# Patient Record
Sex: MALE | Race: BLACK OR AFRICAN AMERICAN | Employment: FULL TIME | URBAN - METROPOLITAN AREA
[De-identification: names, ages, dates, MRNs, and addresses within clinical notes are randomized per-mention and may not be internally consistent; named-entity substitution may affect disease eponyms.]

---

## 2018-10-31 ENCOUNTER — HOSPITAL ENCOUNTER (OUTPATIENT)
Dept: PHYSICAL THERAPY | Age: 68
Discharge: HOME | End: 2018-10-31
Payer: MEDICARE

## 2018-10-31 PROCEDURE — G8979 MOBILITY GOAL STATUS: HCPCS

## 2018-10-31 PROCEDURE — G8978 MOBILITY CURRENT STATUS: HCPCS

## 2018-10-31 PROCEDURE — 97530 THERAPEUTIC ACTIVITIES: CPT

## 2018-10-31 PROCEDURE — 97110 THERAPEUTIC EXERCISES: CPT

## 2018-10-31 PROCEDURE — 97162 PT EVAL MOD COMPLEX 30 MIN: CPT

## 2018-10-31 NOTE — PROGRESS NOTES
PT DAILY TREATMENT NOTE - [facility] 3-16 Patient Name: Arlan Denver Date:10/31/2018 : 1950 [x]  Patient  Verified Payor: [place] MED[place]ARE / Plan: Reva Cox / Product Type: Medicare / In time:10:18 am  Out time:11:05 am  
Total Treatment Time (min): 47 Total Timed Codes (min): 23 
1:1 Treatment Time ( [address] only): 47 Visit #: 1 of 12 Treatment Area: Right knee pain [M25.561] Low back pain [M54.5] SUBJECTIVE Pain Level (0-10 scale): 1-2 - LBP Any medication changes, allergies to medications, adverse drug reactions, diagnosis change, or new procedure performed?: [x] No    [] Yes (see summary sheet for update) Subjective functional status/changes:   [] No changes reported Hx Present Illness: C/C of Right knee pain and LBP \"My knee is ok it gets sore and stiff sometimes. \" Increase in knee pain with sitting >1 hour, squatting, standing after sitting, intermittent pain with stair negotiation Had Right TKR in 2014 Reports intermittent knee pain since replacement Pt expressed that feels that quad and LE is weak \"The back. It bothers me. \" Increase in LBP with sitting, bending, squatting Has positive hx of LBP in the past treated successfully with PT Reoccurrence of LBP ~1 month ago with working on generator at home Denies radicular sx at this time and denies pain into either LE Pain:  _5__/10 max       __0_/10 min     _1-2___/10 now    Location: indicates Left side of L-spine - right QL, Left buttock Right Knee - indicates lateral joint line, anterior joint line 
   [] Sharp    [] Dull      [] Burning     [x]  Aching     [] Throbbing      [] Tingling 
   [] Other:  
    []  Constant                   [x] Intermittent Previous treatment:   Previously PT in  PMHX: PMHx/Surgical Hx:  Right Knee TKR - 2014 Social/Recreation/Work: Work Hx: manager of operations for [employer] Living Situation: Lives with spouse, 2-story home Recreational Activities: walks for exercise 1 hour 3-4 times per week, household chores, yard work Patient Goal(s): \"Bottom line: exercises that I can do, when to do them to maintain the overall strength of my legs and core. \" 
 
Barriers: []pain []financial []time []transportation []other Motivation: high Substance use: []Alcohol []Tobacco []other:  
FABQ Score: []low []elevate Cognition: A & O x 4 Other OBJECTIVE 24 min [x]Eval                  []Re-Eval  
 
       
With 
 [x] TE - 8 min 
 [x] TA - 15 min min  
 [] neuro 
 [] other: Patient Education: [x] Review HEP [] Progressed/Changed HEP based on:  
[] positioning   [] body mechanics   [] transfers   [] heat/ice application   
[x] other: pt education regarding exam findings, anatomy involved, POC, sit<>stand and positioning techniques Other Objective/Functional Measures: Movement/gait:  Decreased right arm swing, toe out bilaterally Visual Inspection: Thoracic: flattened Lumbar: increased Shoulder/Scapula: left shoulder lower, bilateral scap adducted Palpation: palpable crepitus at left knee with AROM ext<>flexion Increased tone in lumbar paraspinals Noted clunking at right knee with assessment  
TTP at left posterior hip musculature AROM/PROM Right Left Standing Forward Flexion: 4 in from floor Extension: dcr 25% Trunk Rot 19 in  19.5 in Hip IR 22 15 Knee Ext-Flex 0-132[facility] Strength Right Left Quad Set strong strong Hip Flex 5 5 Knee Ext 5 5 Hamstrings 4 4 Hip Abd 4 4 Bridging: decreased height, hamstring cramping Glut inhibition: bilaterally Special Tests Right Left Slump - -  
SLR - - Passive SLR 90 90 Hip Add Drop + + Gaenslen's - -  
YINA - - Other Right Left Other Comments: Standing Forward Flexion 4 in from floor, decreased reversal of lordosis Gillet: hypomobile bilaterally, Left >Right Pain Level (0-10 scale) post treatment: 0-1 ASSESSMENT/Changes in Function:  
Pt is a very pleasant 77 y.o. Male with C/C of intermittent LBP and Right knee pain. Pt has had right TKR in 2014 and has hx of LBP that has been treated with positive results in PT previously. Pt reports insidious onset of LBP ~ 1 month ago with working on piece of equipment. Pt reports has had intermittent right knee pain since TKR. At present signs/symptoms of LBP consistent with mechanical LBP, pt denies radicular sx at this time and none elicited with exam techniques. Objected findings include decreased trunk rot, decreased reversal of lordosis with forward flexion, decreased core stabilization, decreased hip and glut strength with significant glut and hamstring inhibition. Also noted significantly decreased Hip IR and anterior tip to pelvis. Patient will continue to benefit from skilled PT services to modify and progress therapeutic interventions, address functional mobility deficits, address ROM deficits, address strength deficits, analyze and address soft tissue restrictions, analyze and cue movement patterns, analyze and modify body mechanics/ergonomics, assess and modify postural abnormalities and instruct in home and community integration to attain remaining goals. [x]  See Plan of Care 
[]  See progress note/recertification 
[]  See Discharge Summary Progress towards goals / Updated goals: 
Short Term Goals: STG- To be accomplished in 2 week(s): 1. Pt will be independent with HEP to encourage prophylaxis. Eval:dispensed Current: NA Long Term Goals: LTG- To be accomplished in 6 week(s): 1. Pt will improve trunk rotation to 15 in or less in hooklying bilaterally to indicate mobility needed for gait and daily activities. . 
Eval:Right 19 in Left 19.5 in Current: NA 
 
2.   Pt will be able to bridge >10 times to full height with proper form and no hamstring cramping to indicate increased functional glut and hamstring strength needed for daily activities. Eval:decreased height and cramping in hamstring with 2nd rep Current: NA 
 
3. Pt will be able to sit >90 min and stand with no LBP or knee pain to increase tolerance to work and daily activities. Eval:pain with standing after sitting 30 min Current: NA 
 
4. Pt FOTO score will increase by 5 points to show improvement in function. Eval:68 - Knee Current: will address at visit 5 PLAN [x]  Upgrade activities as tolerated     []  Continue plan of care 
[]  Update interventions per flow sheet      
[]  Discharge due to:_ 
[]  Other:_ Marty Code, PT, DPT 10/31/2018  10:19 AM 
 
No future appointments.

## 2018-10-31 NOTE — PROGRESS NOTES
[facility] at the [address], [place], [address] [facility] Phone: [phone]      Fax:  [phone] Plan of Care/ Statement of Necessity for Physical Therapy Services Patient name: Ulysses Garcia Start of Care: 10/31/2018 Referral source: Marylen Reasoner,  : 1950 Medical Diagnosis: Right knee pain [M25.561] Low back pain [M54.5] Onset Date:~1 month ago Treatment Diagnosis: Right Knee Pain, Mechanical LBP Prior Hospitalization: see medical history Provider#: [id number] Medications: Verified on Patient summary List  
 Comorbidities: HTN, Hx of LBP, previous surgery, BMI >30 Prior Level of Function: walking 3 miles 4 days a week with no pain, no pain or restriction with daily and work activities The Plan of Care and following information is based on the information from the initial evaluation. Assessment/ key information:  
Pt is a very pleasant 77 y.o. Male with C/C of intermittent LBP and Right knee pain. Pt has had right TKR in  and has hx of LBP that has been treated with positive results in PT previously. Pt reports insidious onset of LBP ~ 1 month ago with working on piece of equipment. Pt reports has had intermittent right knee pain since TKR. At present signs/symptoms of LBP consistent with mechanical LBP, pt denies radicular sx at this time and none elicited with exam techniques. Objected findings include decreased trunk rot, decreased reversal of lordosis with forward flexion, decreased core stabilization, decreased hip and glut strength with significant glut and hamstring inhibition. Also noted significantly decreased Hip IR and anterior tip to pelvis.   
 
Evaluation Complexity History HIGH Complexity :3+ comorbidities / personal factors will impact the outcome/ POC ; Examination HIGH Complexity : 4+ Standardized tests and measures addressing body structure, function, activity limitation and / or participation in recreation  ;Presentation MEDIUM Complexity : Evolving with changing characteristics  ; Clinical Decision Making MEDIUM Complexity : FOTO score of 26-74 Overall Complexity Rating: MEDIUM Problem List: pain affecting function, decrease ROM, decrease strength, impaired gait/ balance, decrease ADL/ functional abilitiies, decrease activity tolerance and decrease flexibility/ joint mobility Treatment Plan may include any combination of the following: Therapeutic exercise, Therapeutic activities, Neuromuscular re-education, Physical agent/modality, Gait/balance training, Manual therapy and Patient education Patient / Family readiness to learn indicated by: asking questions, trying to perform skills and interest 
Persons(s) to be included in education: patient (P) Barriers to Learning/Limitations: None Patient Goal (s): \"Bottom line: exercises that I can do, when to do them to maintain the overall strength of my legs and core. \" 
Patient Self Reported Health Status: good Rehabilitation Potential: good Short Term Goals: STG- To be accomplished in 2 week(s): 1. Pt will be independent with HEP to encourage prophylaxis. Eval:dispensed Current: NA 
  
Long Term Goals: LTG- To be accomplished in 6 week(s): 1. Pt will improve trunk rotation to 15 in or less in hooklying bilaterally to indicate mobility needed for gait and daily activities. . 
Eval:Right 19 in Left 19.5 in Current: NA 
  
2.  Pt will be able to bridge >10 times to full height with proper form and no hamstring cramping to indicate increased functional glut and hamstring strength needed for daily activities. Eval:decreased height and cramping in hamstring with 2nd rep Current: NA 
  
3.  Pt will be able to sit >90 min and stand with no LBP or knee pain to increase tolerance to work and daily activities. Eval:pain with standing after sitting 30 min Current: NA 
  
 4.  Pt FOTO score will increase by 5 points to show improvement in function. Eval:68 - Knee Current: will address at visit 5 Frequency / Duration: Patient to be seen 2 times per week for 6 weeks. Patient/ Caregiver education and instruction: Diagnosis, prognosis, self care, activity modification and exercises 
 [x]  Plan of care has been reviewed with PTA 
 
G-Codes (GP) Mobility  Current  CJ= 20-39%   Goal  CJ= 20-39% The severity rating is based on clinical judgment and the FOTO score. Certification Period: 10/31/18 - 12/30/18 Tamir Francisco PT, DPT 10/31/2018 11:24 AM 
_____________________________________________________________________ I certify that the above Therapy Services are being furnished while the patient is under my care. I agree with the treatment plan and certify that this therapy is necessary. [de-identified] Signature:____________Date:_________TIME:________ 
 
[employer], Date and Time must be completed for valid certification ** Please sign and return to [facility] at the [address], [facility], [address] [facility] Phone: [phone]      Fax:  [phone]

## 2018-11-05 ENCOUNTER — HOSPITAL ENCOUNTER (OUTPATIENT)
Dept: PHYSICAL THERAPY | Age: 68
Discharge: HOME | End: 2018-11-05
Payer: MEDICARE

## 2018-11-05 PROCEDURE — 97110 THERAPEUTIC EXERCISES: CPT

## 2018-11-05 PROCEDURE — 97530 THERAPEUTIC ACTIVITIES: CPT

## 2018-11-05 NOTE — PROGRESS NOTES
PT DAILY TREATMENT NOTE - [facility]  Patient Name: Chelita Keith Date:2018 : 1950 [x]  Patient  Verified Payor: [place] MEDICARE / Plan: Reva Cox / Product Type: Medicare / In time:7:22 am  Out time: 8:17 am 
Total Treatment Time (min): 55 Total Timed Codes (min): 55 
1:1 Treatment Time ( [address] only): 25 Visit #: 2 of 12 Treatment Area: Right knee pain [M25.561] Low back pain [M54.5] SUBJECTIVE Pain Level (0-10 scale): 0 Any medication changes, allergies to medications, adverse drug reactions, diagnosis change, or new procedure performed?: [x] No    [] Yes (see summary sheet for update) Subjective functional status/changes:   [] No changes reported \"No, no pain really. \" OBJECTIVE Modality rationale:   
Type Additional Details  
[] Estim:  []Unatt       []IFC  []Premod []Other:  []w/ice   []w/heat Position: Location:  
[] Estim: []Att    []TENS instruct  []NMES []Other:  []w/US   []w/ice   []w/heat Position: Location:  
[]  Traction: [] Cervical       []Lumbar 
                     [] Prone          []Supine []Intermittent   []Continuous Lbs: 
[] before manual 
[] after manual  
[]  Ultrasound: []Continuous   [] Pulsed []1MHz   []3MHz W/cm2: 
Location:  
[]  Iontophoresis with dexamethasone Location: [] Take home patch  
[] In clinic  
[]  Ice     []  heat 
[]  Ice massage 
[]  Laser  
[]  Anodyne Position: Location:  
[]  Laser with stim 
[]  Other:  Position: Location:  
[]  Vasopneumatic Device Pressure:       [] lo [] med [] hi  
Temperature: [] lo [] med [] hi  
[] Skin assessment post-treatment:  []intact []redness- no adverse reaction 
  []redness  adverse reaction:  
 
 
45 min Therapeutic Exercise:  [x] See flow sheet :  
Rationale: increase ROM, increase strength, improve coordination and increase proprioception to improve the patients ability to perform daily activities with decreased pain and symptom levels 10 min Therapeutic Activity:  [x]  See flow sheet :  
Rationale: increase ROM, increase strength, improve coordination and increase proprioception  to improve the patients ability to perform daily activities with decreased pain and symptom levels With 
 [] TE 
 [x] TA 
 [] neuro 
 [] other: Patient Education: [x] Review HEP [] Progressed/Changed HEP based on:  
[] positioning   [] body mechanics   [] transfers   [] heat/ice application   
[] other:   
 
Other Objective/Functional Measures:  
Required tactile cues for proper form with 90-90s Pain Level (0-10 scale) post treatment: 0 
 
ASSESSMENT/Changes in Function:  
Reported significant tightness in left posterior hip capsule. Required pelvic repositioning to facilitate gluts with clamshells. Patient will continue to benefit from skilled PT services to modify and progress therapeutic interventions, address functional mobility deficits, address ROM deficits, address strength deficits, analyze and address soft tissue restrictions, analyze and cue movement patterns, analyze and modify body mechanics/ergonomics, assess and modify postural abnormalities and instruct in home and community integration to attain remaining goals. []  See Plan of Care 
[]  See progress note/recertification 
[]  See Discharge Summary Progress towards goals / Updated goals: 
Short Term Goals: STG- To be accomplished in 2 week(s): 1.  Pt will be independent with HEP to encourage prophylaxis. Eval:dispensed Current: reviewed importance of compliance with pt 
  
Long Term Goals: LTG- To be accomplished in 6 week(s): 1.  Pt will improve trunk rotation to 15 in or less in hooklying bilaterally to indicate mobility needed for gait and daily activities. . 
Eval:Right 19 in Left 19.5 in  
Current: NA 
  
 2.  Pt will be able to bridge >10 times to full height with proper form and no hamstring cramping to indicate increased functional glut and hamstring strength needed for daily activities. Eval:decreased height and cramping in hamstring with 2nd rep Current: NA 
  
3.  Pt will be able to sit >90 min and stand with no LBP or knee pain to increase tolerance to work and daily activities. Eval:pain with standing after sitting 30 min  
Current: NA 
  
4.  Pt FOTO score will increase by 5 points to show improvement in function. Eval:68 - Knee Current: will address at visit 5 PLAN [x]  Upgrade activities as tolerated     []  Continue plan of care 
[]  Update interventions per flow sheet      
[]  Discharge due to:_ 
[]  Other:_ Carry ELIZABETH Pino DPT 11/5/2018  7:28 AM 
 
Future Appointments Date Time Provider [place] 11/5/2018  7:30 AM Ana Barker PT, DPT [facility]  
11/7/2018  8:30 AM Ana Barker PT, DPT [facility]  
11/13/2018 11:30 AM Ana Barker PT, DPT [facility]  
11/16/2018 12:00 PM Ana Barker PT, DPT [facility]  
11/20/2018 11:30 AM Ana Barker PT, DPT [facility]  
11/26/2018  9:00 AM Ana Barker PT, DPT [facility]  
11/28/2018  8:00 AM Ana Barker PT, DPT [facility]

## 2018-11-07 ENCOUNTER — APPOINTMENT (OUTPATIENT)
Dept: PHYSICAL THERAPY | Age: 68
End: 2018-11-07
Payer: MEDICARE

## 2018-11-08 ENCOUNTER — HOSPITAL ENCOUNTER (OUTPATIENT)
Dept: PHYSICAL THERAPY | Age: 68
Discharge: HOME | End: 2018-11-08
Payer: MEDICARE

## 2018-11-08 PROCEDURE — 97110 THERAPEUTIC EXERCISES: CPT

## 2018-11-08 PROCEDURE — 97530 THERAPEUTIC ACTIVITIES: CPT

## 2018-11-08 NOTE — PROGRESS NOTES
PT DAILY TREATMENT NOTE - [facility]  Patient Name: Selam Epstein Date:2018 : 1950 [x]  Patient  Verified Payor: [place] MEDICARE / Plan: Reva Marie y / Product Type: Medicare / In time:8:25  Out time:9:17 Total Treatment Time (min): 52 Total Timed Codes (min): 52 
1:1 Treatment Time ( only): 40 Visit #: 3 of 12 Treatment Area: Low back pain [M54.5] SUBJECTIVE Pain Level (0-10 scale): 0 Any medication changes, allergies to medications, adverse drug reactions, diagnosis change, or new procedure performed?: [x] No    [] Yes (see summary sheet for update) Subjective functional status/changes:   [] No changes reported \"I did the clams last night and I know I did them right because it kicked my butt. \" OBJECTIVE 37 min Therapeutic Exercise:  [x] See flow sheet :  
Rationale: increase ROM and increase strength to improve the patients ability to perform daily activities with decreased pain and symptom levels 15 min Therapeutic Activity:  [x]  See flow sheet :  
Rationale: increase ROM, increase strength, improve coordination and increase proprioception  to improve the patients ability to perform daily activities with decreased pain and symptom levels With 
 [] TE 
 [] TA 
 [] neuro 
 [] other: Patient Education: [x] Review HEP [] Progressed/Changed HEP based on:  
[] positioning   [] body mechanics   [] transfers   [] heat/ice application   
[] other:   
 
Other Objective/Functional Measures:  
Glut fatigue with right glut max Cues for posterior weight shift with TRX squats Pain Level (0-10 scale) post treatment: 0 
 
ASSESSMENT/Changes in Function: Good tolerance to exercises with improved form with clams and bridges. Continued decreased height with bridges however no cramaping reported today.   
 
Patient will continue to benefit from skilled PT services to modify and progress therapeutic interventions, address functional mobility deficits, address ROM deficits, address strength deficits, analyze and cue movement patterns, analyze and modify body mechanics/ergonomics, assess and modify postural abnormalities and instruct in home and community integration to attain remaining goals. []  See Plan of Care 
[]  See progress note/recertification 
[]  See Discharge Summary Progress towards goals / Updated goals: 
Short Term Goals: STG- To be accomplished in 2 week(s): 1.  Pt will be independent with HEP to encourage prophylaxis. Eval:dispensed Current: compliance with clams  
  
Long Term Goals: LTG- To be accomplished in 6 week(s): 1.  Pt will improve trunk rotation to 15 in or less in hooklying bilaterally to indicate mobility needed for gait and daily activities. . 
Eval:Right 19 in Left 19.5 in  
Current: NA 
  
2.  Pt will be able to bridge >10 times to full height with proper form and no hamstring cramping to indicate increased functional glut and hamstring strength needed for daily activities. Eval:decreased height and cramping in hamstring with 2nd rep Current: no cramping today, decreased height still with ball to decreased ER 
  
3.  Pt will be able to sit >90 min and stand with no LBP or knee pain to increase tolerance to work and daily activities. Eval:pain with standing after sitting 30 min  
Current: NA 
  
4.  Pt FOTO score will increase by 5 points to show improvement in function. Eval:68 - Knee Current: will address at visit 5 PLAN [x]  Upgrade activities as tolerated     [x]  Continue plan of care 
[]  Update interventions per flow sheet      
[]  Discharge due to:_ 
[]  Other:_ Ghazala William 11/8/2018  9:06 AM 
 
Future Appointments Date Time Provider [place] 11/13/2018 11:30 AM Kannan Oviedo PT, DPT [facility]  
11/16/2018 12:00 PM Kannan Oviedo PT, DPT [facility]  
11/20/2018 11:30 AM Kannan Oviedo PT, DPT [facility]  
11/26/2018  9:00 AM Kannan Oviedo PT, DPT [facility]  
 11/28/2018  8:00 AM Sheela Mccormick, PT, DPT [facility]

## 2018-11-13 ENCOUNTER — HOSPITAL ENCOUNTER (OUTPATIENT)
Dept: PHYSICAL THERAPY | Age: 68
Discharge: HOME | End: 2018-11-13
Payer: MEDICARE

## 2018-11-13 PROCEDURE — 97530 THERAPEUTIC ACTIVITIES: CPT

## 2018-11-13 PROCEDURE — 97110 THERAPEUTIC EXERCISES: CPT

## 2018-11-13 NOTE — PROGRESS NOTES
PT DAILY TREATMENT NOTE - [facility]  Patient Name: Selam Epstein Date:2018 : 1950 [x]  Patient  Verified Payor: [place] MEDICARE / Plan: Reva Marie [employer] / Product Type: Medicare / In time:11:25 am  Out time: 12:21 pm  
Total Treatment Time (min): 56 Total Timed Codes (min): 56 
1:1 Treatment Time ( [address] only): 28 Visit #: 4 of 12 Treatment Area: Right knee pain [M25.561] Low back pain [M54.5] SUBJECTIVE Pain Level (0-10 scale): 0 Any medication changes, allergies to medications, adverse drug reactions, diagnosis change, or new procedure performed?: [x] No    [] Yes (see summary sheet for update) Subjective functional status/changes:   [] No changes reported \"Doing good. Doing the homework. \" OBJECTIVE Modality rationale:   
Type Additional Details  
[] Estim:  []Unatt       []IFC  []Premod []Other:  []w/ice   []w/heat Position: Location:  
[] Estim: []Att    []TENS instruct  []NMES []Other:  []w/US   []w/ice   []w/heat Position: Location:  
[]  Traction: [] Cervical       []Lumbar 
                     [] Prone          []Supine []Intermittent   []Continuous Lbs: 
[] before manual 
[] after manual  
[]  Ultrasound: []Continuous   [] Pulsed []1MHz   []3MHz W/cm2: 
Location:  
[]  Iontophoresis with dexamethasone Location: [] Take home patch  
[] In clinic  
[]  Ice     []  heat 
[]  Ice massage 
[]  Laser  
[]  Anodyne Position: Location:  
[]  Laser with stim 
[]  Other:  Position: Location:  
[]  Vasopneumatic Device Pressure:       [] lo [] med [] hi  
Temperature: [] lo [] med [] hi  
[] Skin assessment post-treatment:  []intact []redness- no adverse reaction 
  []redness  adverse reaction:  
 
46 min Therapeutic Exercise:  [x] See flow sheet :  
Rationale: increase ROM, increase strength, improve coordination and increase proprioception to improve the patients ability to perform daily activities with decreased pain and symptom levels 
  
10 min Therapeutic Activity:  [x]  See flow sheet :  
Rationale: increase ROM, increase strength, improve coordination and increase proprioception  to improve the patients ability to perform daily activities with decreased pain and symptom levels With 
 [] TE 
 [] TA 
 [] neuro 
 [] other: Patient Education: [x] Review HEP [] Progressed/Changed HEP based on:  
[] positioning   [] body mechanics   [] transfers   [] heat/ice application   
[] other:   
 
Other Objective/Functional Measures:  
Very challenged with TRX squats Pain Level (0-10 scale) post treatment: 0 
 
ASSESSMENT/Changes in Function:  
Pt very challenged with right glut max, bridges and TRX squats. Need to continue strengthening with good positioning and focusing on glut and hamstring facilitation. Patient will continue to benefit from skilled PT services to modify and progress therapeutic interventions, address functional mobility deficits, address ROM deficits, address strength deficits, analyze and address soft tissue restrictions, analyze and cue movement patterns, analyze and modify body mechanics/ergonomics, assess and modify postural abnormalities and instruct in home and community integration to attain remaining goals. []  See Plan of Care 
[]  See progress note/recertification 
[]  See Discharge Summary Progress towards goals / Updated goals: 
Short Term Goals: STG- To be accomplished in 2 week(s): 1.  Pt will be independent with HEP to encourage prophylaxis. Eval:dispensed Current: Reports compliance  
  
Long Term Goals: LTG- To be accomplished in 6 week(s): 1.  Pt will improve trunk rotation to 15 in or less in hooklying bilaterally to indicate mobility needed for gait and daily activities. . 
Eval:Right 19 in Left 19.5 in  
Current: NA 
  
 2.  Pt will be able to bridge >10 times to full height with proper form and no hamstring cramping to indicate increased functional glut and hamstring strength needed for daily activities. Eval:decreased height and cramping in hamstring with 2nd rep Current: no cramping today, decreased height still with ball to decreased ER 
  
3.  Pt will be able to sit >90 min and stand with no LBP or knee pain to increase tolerance to work and daily activities. Eval:pain with standing after sitting 30 min  
Current: NA 
  
4.  Pt FOTO score will increase by 5 points to show improvement in function. Eval:68 - Knee Current: will address at visit 5 
  
 
PLAN [x]  Upgrade activities as tolerated     []  Continue plan of care 
[]  Update interventions per flow sheet      
[]  Discharge due to:_ 
[]  Other:_ Danny Lorenz PT, DPT 11/13/2018  11:39 AM 
 
Future Appointments Date Time Provider Quin[place] Mari 11/16/2018 11:00 AM Radha Cedillo PT, DPT [facility]  
11/20/2018 11:30 AM Radha Cedillo PT, DPT [facility]  
11/26/2018  9:00 AM Radha Cdeillo PT, DPT [facility]  
11/28/2018  8:00 AM Radha Cedillo PT, DPT [facility]

## 2018-11-16 ENCOUNTER — HOSPITAL ENCOUNTER (OUTPATIENT)
Dept: PHYSICAL THERAPY | Age: 68
Discharge: HOME | End: 2018-11-16
Payer: MEDICARE

## 2018-11-16 PROCEDURE — 97110 THERAPEUTIC EXERCISES: CPT

## 2018-11-16 PROCEDURE — 97530 THERAPEUTIC ACTIVITIES: CPT

## 2018-11-16 NOTE — PROGRESS NOTES
PT DAILY TREATMENT NOTE - [facility]  Patient Name: Rosaura Epps Date:2018 : 1950 [x]  Patient  Verified Payor: [place] MEDICARE / Plan: Reva Cox / Product Type: Medicare / In time:11:00 am  Out time:12:01 pm 
Total Treatment Time (min): 61 Total Timed Codes (min): 61 
1:1 Treatment Time ([facility] only): 43 Visit #: 5 of 12 Treatment Area: Right knee pain [M25.561] Low back pain [M54.5] SUBJECTIVE Pain Level (0-10 scale): 0 Any medication changes, allergies to medications, adverse drug reactions, diagnosis change, or new procedure performed?: [x] No    [] Yes (see summary sheet for update) Subjective functional status/changes:   [] No changes reported \"I feel good. I know I was here after last time. \" OBJECTIVE Modality rationale:   
Type Additional Details  
[] Estim:  []Unatt       []IFC  []Premod []Other:  []w/ice   []w/heat Position: Location:  
[] Estim: []Att    []TENS instruct  []NMES []Other:  []w/US   []w/ice   []w/heat Position: Location:  
[]  Traction: [] Cervical       []Lumbar 
                     [] Prone          []Supine []Intermittent   []Continuous Lbs: 
[] before manual 
[] after manual  
[]  Ultrasound: []Continuous   [] Pulsed []1MHz   []3MHz W/cm2: 
Location:  
[]  Iontophoresis with dexamethasone Location: [] Take home patch  
[] In clinic  
[]  Ice     []  heat 
[]  Ice massage 
[]  Laser  
[]  Anodyne Position: Location:  
[]  Laser with stim 
[]  Other:  Position: Location:  
[]  Vasopneumatic Device Pressure:       [] lo [] med [] hi  
Temperature: [] lo [] med [] hi  
[] Skin assessment post-treatment:  []intact []redness- no adverse reaction 
  []redness  adverse reaction:  
 
 
46 min Therapeutic Exercise:  [x] See flow sheet :  
Rationale: increase ROM, increase strength, improve coordination and increase proprioception to improve the patients ability to perform daily activities with decreased pain and symptom levels 15 min Therapeutic Activity:  [x]  See flow sheet : reassessed with FOTO Rationale: increase ROM, increase strength, improve coordination and increase proprioception  to improve the patients ability to perform daily activities with decreased pain and symptom levels With 
 [] TE 
 [] TA 
 [] neuro 
 [] other: Patient Education: [x] Review HEP [] Progressed/Changed HEP based on:  
[] positioning   [] body mechanics   [] transfers   [] heat/ice application   
[] other:   
 
Other Objective/Functional Measures: FOTO 55/77 Pain Level (0-10 scale) post treatment: 0 
 
ASSESSMENT/Changes in Function:  
Pt reported that is feeling most restriction at anterior and lateral right knee. Significant right glut max inhibition. Challenged with TRX squats. Patient will continue to benefit from skilled PT services to modify and progress therapeutic interventions, address functional mobility deficits, address ROM deficits, address strength deficits, analyze and address soft tissue restrictions, analyze and cue movement patterns, analyze and modify body mechanics/ergonomics, assess and modify postural abnormalities and instruct in home and community integration to attain remaining goals. []  See Plan of Care 
[]  See progress note/recertification 
[]  See Discharge Summary Progress towards goals / Updated goals: 
Short Term Goals: STG- To be accomplished in 2 week(s): 1.  Pt will be independent with HEP to encourage prophylaxis. Eval:dispensed Current: Reports compliance  
  
Long Term Goals: LTG- To be accomplished in 6 week(s): 1.  Pt will improve trunk rotation to 15 in or less in hooklying bilaterally to indicate mobility needed for gait and daily activities. . 
Eval:Right 19 in Left 19.5 in  
Current: NA 
  
 2.  Pt will be able to bridge >10 times to full height with proper form and no hamstring cramping to indicate increased functional glut and hamstring strength needed for daily activities. Eval:decreased height and cramping in hamstring with 2nd rep Current: no cramping today, decreased height 
  
3.  Pt will be able to sit >90 min and stand with no LBP or knee pain to increase tolerance to work and daily activities. Eval:pain with standing after sitting 30 min  
Current: NA 
  
4.  Pt FOTO score will increase by 5 points to show improvement in function. Eval:68 - Knee Current:regression Knee 55 PLAN [x]  Upgrade activities as tolerated     []  Continue plan of care 
[]  Update interventions per flow sheet      
[]  Discharge due to:_ 
[]  Other:_ Beatriz Marie PT, DPT 11/16/2018  11:02 AM 
 
Future Appointments Date Time Provider Quincy Mari 11/20/2018 11:30 AM Jarad Thompson PT, DPT [facility]  
11/26/2018  9:00 AM Jarad Thompson PT, DPT [facility]  
11/28/2018  8:00 AM Jarad Thompson PT, DPT [facility]

## 2018-11-20 ENCOUNTER — APPOINTMENT (OUTPATIENT)
Dept: PHYSICAL THERAPY | Age: 68
End: 2018-11-20
Payer: MEDICARE

## 2018-11-26 ENCOUNTER — HOSPITAL ENCOUNTER (OUTPATIENT)
Dept: PHYSICAL THERAPY | Age: 68
Discharge: HOME | End: 2018-11-26
Payer: MEDICARE

## 2018-11-26 PROCEDURE — 97110 THERAPEUTIC EXERCISES: CPT

## 2018-11-26 NOTE — PROGRESS NOTES
PT DAILY TREATMENT NOTE - [facility]  Patient Name: Madhuri Balbuena Date:2018 : 1950 [x]  Patient  Verified Payor: [place] MEDICARE / Plan: Reva Cox / Product Type: Medicare / In time:9:00 am  Out time:10:00 am 
Total Treatment Time (min): 55 Total Timed Codes (min): 55 
1:1 Treatment Time ( [address] only): 29 Visit #: 6 of 12 Treatment Area: Right knee pain [M25.561] Low back pain [M54.5] SUBJECTIVE Pain Level (0-10 scale): 0 Any medication changes, allergies to medications, adverse drug reactions, diagnosis change, or new procedure performed?: [x] No    [] Yes (see summary sheet for update) Subjective functional status/changes:   [] No changes reported \"I am doing well. \" OBJECTIVE Modality rationale:   
Type Additional Details  
[] Estim:  []Unatt       []IFC  []Premod []Other:  []w/ice   []w/heat Position: Location:  
[] Estim: []Att    []TENS instruct  []NMES []Other:  []w/US   []w/ice   []w/heat Position: Location:  
[]  Traction: [] Cervical       []Lumbar 
                     [] Prone          []Supine []Intermittent   []Continuous Lbs: 
[] before manual 
[] after manual  
[]  Ultrasound: []Continuous   [] Pulsed []1MHz   []3MHz W/cm2: 
Location:  
[]  Iontophoresis with dexamethasone Location: [] Take home patch  
[] In clinic  
[]  Ice     []  heat 
[]  Ice massage 
[]  Laser  
[]  Anodyne Position: Location:  
[]  Laser with stim 
[]  Other:  Position: Location:  
[]  Vasopneumatic Device Pressure:       [] lo [] med [] hi  
Temperature: [] lo [] med [] hi  
[] Skin assessment post-treatment:  []intact []redness- no adverse reaction 
  []redness  adverse reaction:  
 
55 min Therapeutic Exercise:  [x] See flow sheet :  
Rationale: increase ROM, increase strength, improve coordination and increase proprioception to improve the patients ability to perform daily activities with decreased pain and symptom levels 
        
With 
 [] TE 
 [] TA 
 [] neuro 
 [] other: Patient Education: [x] Review HEP [] Progressed/Changed HEP based on:  
[] positioning   [] body mechanics   [] transfers   [] heat/ice application   
[] other:   
 
Other Objective/Functional Measures: Forward lean with squats Significant left posterior hip capsule tightness Pain Level (0-10 scale) post treatment: 0 
 
ASSESSMENT/Changes in Function:  
Challenged with squats and right glut max. Significant right glut inhibition. Progress note next session. Patient will continue to benefit from skilled PT services to modify and progress therapeutic interventions, address functional mobility deficits, address ROM deficits, address strength deficits, analyze and address soft tissue restrictions, analyze and cue movement patterns, analyze and modify body mechanics/ergonomics, assess and modify postural abnormalities and instruct in home and community integration to attain remaining goals. []  See Plan of Care 
[]  See progress note/recertification 
[]  See Discharge Summary Progress towards goals / Updated goals: 
Short Term Goals: STG- To be accomplished in 2 week(s): 1.  Pt will be independent with HEP to encourage prophylaxis. Eval:dispensed Current: Reports compliance  
  
Long Term Goals: LTG- To be accomplished in 6 week(s): 1.  Pt will improve trunk rotation to 15 in or less in hooklying bilaterally to indicate mobility needed for gait and daily activities. . 
Eval:Right 19 in Left 19.5 in  
Current: reassess next session 
  
2.  Pt will be able to bridge >10 times to full height with proper form and no hamstring cramping to indicate increased functional glut and hamstring strength needed for daily activities. Eval:decreased height and cramping in hamstring with 2nd rep Current: no cramping today, decreased height 
  
 3.  Pt will be able to sit >90 min and stand with no LBP or knee pain to increase tolerance to work and daily activities. Eval:pain with standing after sitting 30 min  
Current: NA 
  
4.  Pt FOTO score will increase by 5 points to show improvement in function. Eval:68 - Knee Current:regression Knee 55 PLAN [x]  Upgrade activities as tolerated     []  Continue plan of care 
[]  Update interventions per flow sheet      
[]  Discharge due to:_ 
[]  Other:_ Ada Mcduffie, PT, DPT 11/26/2018  9:08 AM 
 
Future Appointments Date Time Provider Quincy Mari 11/28/2018  8:00 AM Scottie Centeno, PT, DPT [facility]

## 2018-11-28 ENCOUNTER — HOSPITAL ENCOUNTER (OUTPATIENT)
Dept: PHYSICAL THERAPY | Age: 68
Discharge: HOME | End: 2018-11-28
Payer: MEDICARE

## 2018-11-28 PROCEDURE — G8979 MOBILITY GOAL STATUS: HCPCS

## 2018-11-28 PROCEDURE — 97530 THERAPEUTIC ACTIVITIES: CPT

## 2018-11-28 PROCEDURE — 97110 THERAPEUTIC EXERCISES: CPT

## 2018-11-28 PROCEDURE — G8978 MOBILITY CURRENT STATUS: HCPCS

## 2018-11-28 NOTE — PROGRESS NOTES
[facility] at the [address], [place], [address] [facility] Phone: [phone]      Fax:  [phone] Medicare Progress Report Patient name: Mary Darling Start of Care: 10/31/2018 Referral source: Andrei Rodriguez DO : 1950 Medical Diagnosis: Right knee pain [M25.561] Low back pain [M54.5] 
  Onset Date:~1 month ago Treatment Diagnosis: Right Knee Pain, Mechanical LBP Prior Hospitalization: see medical history Provider#: [id number] Medications: Verified on Patient summary List  
 Comorbidities: HTN, Hx of LBP, previous surgery, BMI >30 Prior Level of Function: walking 3 miles 4 days a week with no pain, no pain or restriction with daily and work activities Visits from Start of Care: 7    Missed Visits: 1 Reporting Period: 10/31/18 to 18 Subjective Reports: \"getting better. \" 
Pt reports continued left sided LBP with sitting >45 min and with driving. Progress Toward Goals:  
Short Term Goals: STG- To be accomplished in 2 week(s): 1.  Pt will be independent with HEP to encourage prophylaxis. Eval:dispensed Current:Progressing Reports increased regular compliance  
  
Long Term Goals: LTG- To be accomplished in 6 week(s): 1.  Pt will improve trunk rotation to 15 in or less in hooklying bilaterally to indicate mobility needed for gait and daily activities. . 
Eval:Right 19 in Left 19.5 in  
Current: progressing 17.5 in bilat 
  
2.  Pt will be able to bridge >10 times to full height with proper form and no hamstring cramping to indicate increased functional glut and hamstring strength needed for daily activities. Eval:decreased height and cramping in hamstring with 2nd rep Current: Progressing: Bridge to full height without cramping, fatigued after 4-5 reps 
  
3.  Pt will be able to sit >90 min and stand with no LBP or knee pain to increase tolerance to work and daily activities. Eval:pain with standing after sitting 30 min  
Current: progressin min sitting tolerance  
  
4.  Pt FOTO score will increase by 5 points to show improvement in function. Eval:68 - Knee Current:regression Knee 55, discussed with pt how may have overestimated abilities at intake   
 
Key functional changes: Standing Forward Flexion: 2 in from floor Trunk Rot: 17.5 in bilaterally Bridge to full height without cramping, fatigued after 4-5 reps Problems/ barriers to goal attainment: none Assessment / Recommendations: 
Pt is a very pleasant 77 y.o. Male with C/C of intermittent LBP and Right knee pain. Pt has had right TKR in 2014 and has hx of LBP that has been treated with positive results in PT previously. Pt reports insidious onset of LBP ~ 1 month ago with working on piece of equipment. Pt reports has had intermittent right knee pain since TKR. At present signs/symptoms of LBP consistent with mechanical LBP. Has been seen for 7 visits including evaluation. Intermittent compliance with HEP, increased in last 1-2 weeks. Overall pt reports noticing improvement, biggest limitation is with sitting and driving. Treatment has focussed on pelvis repositioning, thoracic rotation, glut and hamstring facilitation and general mobility and stability. Pt is an active adult and would like to increase overall activity to include exercise routine for general fitness and wellness. Could benefit from continued skilled PT to address pain, mobility, strength and stability. Problem List: pain affecting function, decrease ROM, decrease strength, edema affecting function, impaired gait/ balance, decrease ADL/ functional abilitiies, decrease activity tolerance and decrease flexibility/ joint mobility Treatment Plan: Therapeutic exercise, Therapeutic activities, Neuromuscular re-education, Physical agent/modality, Gait/balance training, Manual therapy and Patient education Patient Goal (s) has been updated and includes: Bottom line: exercises that I can do, when to do them to maintain the overall strength of my legs and core. \" Updated Goals to be accomplished in 5 treatments: 
Same as unmet above Frequency / Duration: Patient to be seen 5 treatments G-Codes (GP) Mobility  Current  CJ= 20-39%   Goal  CJ= 20-39% The severity rating is based on clinical judgement and the FOTO score.  
 
Geraldine Keith PT, DPT 11/28/2018 11:35 AM

## 2018-11-28 NOTE — PROGRESS NOTES
PT DAILY TREATMENT NOTE - [facility]  Patient Name: Chelita Keith Date:2018 : 1950 [x]  Patient  Verified Payor: [place] MEDICARE / Plan: Reva Cox / Product Type: Medicare / In time:8:00 am  Out time:8:59 am 
Total Treatment Time (min): 59 Total Timed Codes (min): 59 
1:1 Treatment Time ( only): 40 Visit #: 7 of 12 Treatment Area: Right knee pain [M25.561] Low back pain [M54.5] SUBJECTIVE Pain Level (0-10 scale): 0 Any medication changes, allergies to medications, adverse drug reactions, diagnosis change, or new procedure performed?: [x] No    [] Yes (see summary sheet for update) Subjective functional status/changes:   [] No changes reported \"I can tell it is there. \" OBJECTIVE Modality rationale:   
Type Additional Details  
[] Estim:  []Unatt       []IFC  []Premod []Other:  []w/ice   []w/heat Position: Location:  
[] Estim: []Att    []TENS instruct  []NMES []Other:  []w/US   []w/ice   []w/heat Position: Location:  
[]  Traction: [] Cervical       []Lumbar 
                     [] Prone          []Supine []Intermittent   []Continuous Lbs: 
[] before manual 
[] after manual  
[]  Ultrasound: []Continuous   [] Pulsed []1MHz   []3MHz W/cm2: 
Location:  
[]  Iontophoresis with dexamethasone Location: [] Take home patch  
[] In clinic  
[]  Ice     []  heat 
[]  Ice massage 
[]  Laser  
[]  Anodyne Position: Location:  
[]  Laser with stim 
[]  Other:  Position: Location:  
[]  Vasopneumatic Device Pressure:       [] lo [] med [] hi  
Temperature: [] lo [] med [] hi  
[] Skin assessment post-treatment:  []intact []redness- no adverse reaction 
  []redness  adverse reaction:  
 
49 min Therapeutic Exercise:  [x] See flow sheet :  
Rationale: increase ROM, increase strength, improve coordination and increase proprioception to improve the patients ability to perform daily activities with decreased pain and symptom levels 10 min Therapeutic Activity:  []  See flow sheet : reassessed goals, reviewed sitting activity and positioning to decrease pain with sitting With 
 [] TE 
 [] TA 
 [] neuro 
 [] other: Patient Education: [x] Review HEP [] Progressed/Changed HEP based on:  
[] positioning   [] body mechanics   [] transfers   [] heat/ice application   
[] other:   
 
Other Objective/Functional Measures:  
Standing Forward Flexion: 2 in from floor Trunk Rot: 17.5 in bilaterally Bridge to full height without cramping, fatigued after 4-5 reps Pain Level (0-10 scale) post treatment: 0 
 
ASSESSMENT/Changes in Function:  
Pt reports continued left sided LBP with sitting >45 min and with driving. Pt is a very pleasant 77 y.o. Male with C/C of intermittent LBP and Right knee pain. Pt has had right TKR in 2014 and has hx of LBP that has been treated with positive results in PT previously. Pt reports insidious onset of LBP ~ 1 month ago with working on piece of equipment. Pt reports has had intermittent right knee pain since TKR. At present signs/symptoms of LBP consistent with mechanical LBP. Has been seen for 7 visits including evaluation. Intermittent compliance with HEP, increased in last 1-2 weeks. Overall pt reports noticing improvement, biggest limitation is with sitting and driving. Treatment has focussed on pelvis repositioning, thoracic rotation, glut and hamstring facilitation and general mobility and stability. Pt is an active adult and would like to increase overall activity to include exercise routine for general fitness and wellness. Could benefit from continued skilled PT to address pain, mobility, strength and stability.   
 
Patient will continue to benefit from skilled PT services to modify and progress therapeutic interventions, address functional mobility deficits, address ROM deficits, address strength deficits, analyze and address soft tissue restrictions, analyze and cue movement patterns, analyze and modify body mechanics/ergonomics, assess and modify postural abnormalities and instruct in home and community integration to attain remaining goals. []  See Plan of Care [x]  See progress note/recertification 
[]  See Discharge Summary Progress towards goals / Updated goals: 
Short Term Goals: STG- To be accomplished in 2 week(s): 1.  Pt will be independent with HEP to encourage prophylaxis. Eval:dispensed Current:Progressing Reports increased regular compliance  
  
Long Term Goals: LTG- To be accomplished in 6 week(s): 1.  Pt will improve trunk rotation to 15 in or less in hooklying bilaterally to indicate mobility needed for gait and daily activities. . 
Eval:Right 19 in Left 19.5 in  
Current: progressing 17.5 in bilat 
  
2.  Pt will be able to bridge >10 times to full height with proper form and no hamstring cramping to indicate increased functional glut and hamstring strength needed for daily activities. Eval:decreased height and cramping in hamstring with 2nd rep Current: Progressing: Bridge to full height without cramping, fatigued after 4-5 reps 
  
3.  Pt will be able to sit >90 min and stand with no LBP or knee pain to increase tolerance to work and daily activities. Eval:pain with standing after sitting 30 min  
Current: progressin min sitting tolerance  
  
4.  Pt FOTO score will increase by 5 points to show improvement in function. Eval:68 - Knee Current:regression Knee 55, discussed with pt how may have overestimated abilities at intake   
 
 
PLAN [x]  Upgrade activities as tolerated     []  Continue plan of care 
[]  Update interventions per flow sheet      
[]  Discharge due to:_ 
[]  Other:_ Nesha Lunsford, PT, DPT 2018  8:01 AM 
 
No future appointments.

## 2018-12-04 ENCOUNTER — HOSPITAL ENCOUNTER (OUTPATIENT)
Dept: PHYSICAL THERAPY | Age: 68
Discharge: HOME | End: 2018-12-04
Payer: MEDICARE

## 2018-12-04 PROCEDURE — 97110 THERAPEUTIC EXERCISES: CPT

## 2018-12-04 PROCEDURE — 97530 THERAPEUTIC ACTIVITIES: CPT

## 2018-12-04 NOTE — PROGRESS NOTES
PT DAILY TREATMENT NOTE - [facility]  Patient Name: Ephraim Cost Date:2018 : 1950 [x]  Patient  Verified Payor: [place] MED[place] / Plan: Reva Marie [facility] / Product Type: Medicare / In time:7:55  Out time:9:00 Total Treatment Time (min): 65 Total Timed Codes (min): 65 
1:1 Treatment Time ( [address] only): 35 Visit #: 8 of 12 Treatment Area: Pain in right knee [M25.561] Low back pain [M54.5] SUBJECTIVE Pain Level (0-10 scale): 0 Any medication changes, allergies to medications, adverse drug reactions, diagnosis change, or new procedure performed?: [x] No    [] Yes (see summary sheet for update) Subjective functional status/changes:   [] No changes reported \"I can tell my left leg is weaker. \" OBJECTIVE 50 min Therapeutic Exercise:  [x] See flow sheet :  
Rationale: increase ROM and increase strength to improve the patients ability to perform daily activities with decreased pain and symptom levels 15 min Therapeutic Activity:  []  See flow sheet :  
Rationale: increase strength, improve coordination, improve balance and increase proprioception  to improve the patients ability to perform daily activities with decreased pain and symptom levels With 
 [] TE 
 [] TA 
 [] neuro 
 [] other: Patient Education: [x] Review HEP [] Progressed/Changed HEP based on:  
[] positioning   [] body mechanics   [] transfers   [] heat/ice application   
[] other:   
 
Other Objective/Functional Measures:  
Improved bridge height, fatigue with left brett Pain Level (0-10 scale) post treatment: 0 
 
ASSESSMENT/Changes in Function: Good tolerance to exericses with no pain at end of session. Added open book and left brett today due to c/o increased left sided back pain. Squat form improving however still challenged with engaging gluts.    
 
Patient will continue to benefit from skilled PT services to modify and progress therapeutic interventions, address functional mobility deficits, address strength deficits, analyze and cue movement patterns, analyze and modify body mechanics/ergonomics, assess and modify postural abnormalities and instruct in home and community integration to attain remaining goals. []  See Plan of Care 
[]  See progress note/recertification 
[]  See Discharge Summary Progress towards goals / Updated goals: 
Short Term Goals: STG- To be accomplished in 2 week(s): 1.  Pt will be independent with HEP to encourage prophylaxis. Eval:dispensed Last PN: Reports increased regular compliance  
Current:  
  
Long Term Goals: LTG- To be accomplished in 6 week(s): 1.  Pt will improve trunk rotation to 15 in or less in hooklying bilaterally to indicate mobility needed for gait and daily activities. . 
Eval:Right 19 in Left 19.5 in  
Last PN:17.5 in bilat Current:  
  
2.  Pt will be able to bridge >10 times to full height with proper form and no hamstring cramping to indicate increased functional glut and hamstring strength needed for daily activities. Eval:decreased height and cramping in hamstring with 2nd rep Last PN: Bridge to full height without cramping, fatigued after 4-5 reps Current:  
  
3.  Pt will be able to sit >90 min and stand with no LBP or knee pain to increase tolerance to work and daily activities. Eval:pain with standing after sitting 30 min  
Last PN: 45 min sitting tolerance  
Current:  
  
4.  Pt FOTO score will increase by 5 points to show improvement in function. Eval:68 - Knee Last PN:  Knee 55, discussed with pt how may have overestimated abilities at intake   
Current: PLAN [x]  Upgrade activities as tolerated     [x]  Continue plan of care 
[]  Update interventions per flow sheet      
[]  Discharge due to:_ 
[]  Other:_ Erin Poole Remesi 12/4/2018  8:10 AM 
 
Future Appointments Date Time Provider [place] 12/7/2018  8:30 AM Ronny William [facility]  
12/11/2018  8:00 AM Ronny William [facility]  
12/14/2018  8:00 AM Ronny William [facility]  
12/17/2018  7:30 AM James Wall, PT, DPT [facility]

## 2018-12-07 ENCOUNTER — HOSPITAL ENCOUNTER (OUTPATIENT)
Dept: PHYSICAL THERAPY | Age: 68
Discharge: HOME | End: 2018-12-07
Payer: MEDICARE

## 2018-12-07 PROCEDURE — 97530 THERAPEUTIC ACTIVITIES: CPT

## 2018-12-07 PROCEDURE — 97110 THERAPEUTIC EXERCISES: CPT

## 2018-12-07 NOTE — PROGRESS NOTES
PT DAILY TREATMENT NOTE - [facility]  Patient Name: Edgar Vo Date:2018 : 1950 [x]  Patient  Verified Payor: [place] MEDICARE / Plan: Reva Marie y / Product Type: Medicare / In time:8:30  Out time:9:16 Total Treatment Time (min): 46 Total Timed Codes (min): 46 
1:1 Treatment Time ( only): 35 Visit #: 9 of 12 Treatment Area: Pain in right knee [M25.561] Low back pain [M54.5] SUBJECTIVE Pain Level (0-10 scale): 0 Any medication changes, allergies to medications, adverse drug reactions, diagnosis change, or new procedure performed?: [x] No    [] Yes (see summary sheet for update) Subjective functional status/changes:   [] No changes reported \"Feeling a lot better. That spot in my back isn;t as bad anymore. \" OBJECTIVE 31 min Therapeutic Exercise:  [x] See flow sheet :  
Rationale: increase ROM and increase strength to improve the patients ability to perform daily activities with decreased pain and symptom levels 15 min Therapeutic Activity:  [x]  See flow sheet :  
Rationale: increase strength, improve coordination and increase proprioception  to improve the patients ability to perform daily activities with decreased pain and symptom levels With 
 [] TE 
 [] TA 
 [] neuro 
 [] other: Patient Education: [x] Review HEP [] Progressed/Changed HEP based on:  
[] positioning   [] body mechanics   [] transfers   [] heat/ice application   
[] other:   
 
Other Objective/Functional Measures:  
Challenged with maintaining heel contact and PPT with sit to stands Pain Level (0-10 scale) post treatment: 0 
 
ASSESSMENT/Changes in Function: Good tolerance to exercises with pt reporting overall pain decreasing with increased ease with sitting. Fatigue with bridges however able to clear bottom from table 30 times.   
 
Patient will continue to benefit from skilled PT services to modify and progress therapeutic interventions, address functional mobility deficits, address ROM deficits, address strength deficits, analyze and cue movement patterns, analyze and modify body mechanics/ergonomics, assess and modify postural abnormalities and instruct in home and community integration to attain remaining goals. []  See Plan of Care 
[]  See progress note/recertification 
[]  See Discharge Summary Progress towards goals / Updated goals: 
Short Term Goals: STG- To be accomplished in 2 week(s): 1.  Pt will be independent with HEP to encourage prophylaxis. Eval:dispensed Last PN: Reports increased regular compliance  
Current: progressing  
  
Long Term Goals: LTG- To be accomplished in 6 week(s): 1.  Pt will improve trunk rotation to 15 in or less in hooklying bilaterally to indicate mobility needed for gait and daily activities. . 
Eval:Right 19 in Left 19.5 in  
Last PN:17.5 in bilat Current:  
  
2.  Pt will be able to bridge >10 times to full height with proper form and no hamstring cramping to indicate increased functional glut and hamstring strength needed for daily activities. Eval:decreased height and cramping in hamstring with 2nd rep Last PN: Bridge to full height without cramping, fatigued after 4-5 reps Current:  
  
3.  Pt will be able to sit >90 min and stand with no LBP or knee pain to increase tolerance to work and daily activities. Eval:pain with standing after sitting 30 min  
Last PN: 45 min sitting tolerance  
Current:  
  
4.  Pt FOTO score will increase by 5 points to show improvement in function. Eval:68 - Knee Last PN:  Knee 55, discussed with pt how may have overestimated abilities at intake   
Current:  
  
 
 
 
PLAN [x]  Upgrade activities as tolerated     [x]  Continue plan of care 
[]  Update interventions per flow sheet      
[]  Discharge due to:_ 
[]  Other:_ Sylvain Gonsalez Remesic 12/7/2018  9:08 AM 
 
Future Appointments Date Time Provider [place] Jacey 12/11/2018  8:00 AM Law William [facility]  
12/14/2018  8:00 AM Law William [facility]  
12/17/2018  7:30 AM Donita Arreola, PT, DPT [facility]

## 2018-12-11 ENCOUNTER — HOSPITAL ENCOUNTER (OUTPATIENT)
Dept: PHYSICAL THERAPY | Age: 68
Discharge: HOME | End: 2018-12-11
Payer: MEDICARE

## 2018-12-11 PROCEDURE — 97110 THERAPEUTIC EXERCISES: CPT

## 2018-12-11 PROCEDURE — 97530 THERAPEUTIC ACTIVITIES: CPT

## 2018-12-14 ENCOUNTER — HOSPITAL ENCOUNTER (OUTPATIENT)
Dept: PHYSICAL THERAPY | Age: 68
Discharge: HOME | End: 2018-12-14
Payer: MEDICARE

## 2018-12-14 PROCEDURE — 97530 THERAPEUTIC ACTIVITIES: CPT

## 2018-12-14 PROCEDURE — 97110 THERAPEUTIC EXERCISES: CPT

## 2018-12-17 ENCOUNTER — HOSPITAL ENCOUNTER (OUTPATIENT)
Dept: PHYSICAL THERAPY | Age: 68
Discharge: HOME | End: 2018-12-17
Payer: MEDICARE

## 2018-12-17 PROCEDURE — 97110 THERAPEUTIC EXERCISES: CPT

## 2018-12-17 PROCEDURE — G8980 MOBILITY D/C STATUS: HCPCS

## 2018-12-17 PROCEDURE — 97530 THERAPEUTIC ACTIVITIES: CPT

## 2018-12-17 PROCEDURE — G8979 MOBILITY GOAL STATUS: HCPCS

## 2018-12-17 NOTE — PROGRESS NOTES
PT DAILY TREATMENT NOTE - [facility]     Patient Name: Nena Figueroa  Date:2018  : 1950  [x]  Patient  Verified  Payor: Dandy Jake / Plan: [place] MEDICARE PART A & B / Product Type: Medicare /    In time:7:20 am  Out time:8:20 am  Total Treatment Time (min): 60  Total Timed Codes (min): 60  1:1 Treatment Time ( [address] only): 45   Visit #: 12 of 12    Treatment Area: Pain in right knee [M25.561]  Low back pain [M54.5]    SUBJECTIVE  Pain Level (0-10 scale): 0  Any medication changes, allergies to medications, adverse drug reactions, diagnosis change, or new procedure performed?: [x] No    [] Yes (see summary sheet for update)  Subjective functional status/changes:   [] No changes reported  \"I am good. \"    OBJECTIVE    Modality rationale:    Type Additional Details   [] Estim:  []Unatt       []IFC  []Premod                        []Other:  []w/ice   []w/heat  Position:  Location:   [] Estim: []Att    []TENS instruct  []NMES                    []Other:  []w/US   []w/ice   []w/heat  Position:  Location:   []  Traction: [] Cervical       []Lumbar                       [] Prone          []Supine                       []Intermittent   []Continuous Lbs:  [] before manual  [] after manual   []  Ultrasound: []Continuous   [] Pulsed                           []1MHz   []3MHz W/cm2:  Location:   []  Iontophoresis with dexamethasone         Location: [] Take home patch   [] In clinic   []  Ice     []  heat  []  Ice massage  []  Laser   []  Anodyne Position:  Location:   []  Laser with stim  []  Other:  Position:  Location:   []  Vasopneumatic Device Pressure:       [] lo [] med [] hi   Temperature: [] lo [] med [] hi   [] Skin assessment post-treatment:  []intact []redness- no adverse reaction    []redness  adverse reaction:     40 min Therapeutic Exercise:  [x] See flow sheet :   Rationale: increase ROM, increase strength, improve coordination and increase proprioception to improve the patients ability to perform daily activities with decreased pain and symptom levels    20 min Therapeutic Activity:  [x]  See flow sheet :   Rationale: increase ROM, increase strength, improve coordination and increase proprioception  to improve the patients ability to perform daily activities with decreased pain and symptom levels          With   [] TE   [x] TA   [] neuro   [] other: Patient Education: [x] Review HEP    [] Progressed/Changed HEP based on:   [] positioning   [] body mechanics   [] transfers   [] heat/ice application    [] other:      Other Objective/Functional Measures:   See goals for objective measures     Pain Level (0-10 scale) post treatment: 0    ASSESSMENT/Changes in Function:   Pt has tolerated treatment very well. Reports no pain or limitations at present. Expressed desire to join fitness program to maintain functional gains. Treatment has focussed on pelvis repositioning, thoracic rotation, glut and hamstring facilitation and general mobility and stability    Patient will continue to benefit from skilled PT services to modify and progress therapeutic interventions, address functional mobility deficits, address ROM deficits, address strength deficits, analyze and address soft tissue restrictions, analyze and cue movement patterns, analyze and modify body mechanics/ergonomics, assess and modify postural abnormalities and instruct in home and community integration to attain remaining goals. []  See Plan of Care  []  See progress note/recertification  [x]  See Discharge Summary         Progress towards goals / Updated goals:  Short Term Goals: STG- To be accomplished in 2 week(s):  1.  Pt will be independent with HEP to encourage prophylaxis.   Eval:dispensed  Last PN: Reports increased regular compliance   Current: progressing      Long Term Goals: LTG- To be accomplished in 6 week(s):  1.  Pt will improve trunk rotation to 15 in or less in hooklying bilaterally to indicate mobility needed for gait and daily activities. .  Eval:Right 19 in Left 19.5 in   Last PN:17.5 in bilat  Current: Almost MET 15.5 in bilaterally      2.  Pt will be able to bridge >10 times to full height with proper form and no hamstring cramping to indicate increased functional glut and hamstring strength needed for daily activities. Eval:decreased height and cramping in hamstring with 2nd rep  Last PN: Bridge to full height without cramping, fatigued after 4-5 reps  Current: MET but fatigues MET per pt report      3.  Pt will be able to sit >90 min and stand with no LBP or knee pain to increase tolerance to work and daily activities. Eval:pain with standing after sitting 30 min   Last PN: 45 min sitting tolerance   Current:      4.  Pt FOTO score will increase by 5 points to show improvement in function.   Eval:68 - Knee  Last PN:  Knee 55, discussed with pt how may have overestimated abilities at [facility] - almost MET    PLAN  [x]  Upgrade activities as tolerated     []  Continue plan of care  []  Update interventions per flow sheet       []  Discharge due to:_  []  Other:_      Noelle Phoenix, PT, DPT 12/17/2018  7:29 AM    Future Appointments   Date Time Provider [place] Mari   12/17/2018  7:30 AM Cb Castaneda, PT, DPT [id number]

## 2018-12-17 NOTE — PROGRESS NOTES
[facility] at the [address], [place], [address] [facility]  Phone: [phone]      Fax:  [phone]    Discharge Summary    Patient name: Canelo George Start of Care: 10/31/2018   Referral source: Rehan Rendon DO : 1950               Medical Diagnosis: Right knee pain [M25.561]  Low back pain [M54.5]    Onset Date:~1 month ago               Treatment Diagnosis: Right Knee Pain, Mechanical LBP   Prior Hospitalization: see medical history Provider#: [id number]   Medications: Verified on Patient summary List    Comorbidities: HTN, Hx of LBP, previous surgery, BMI >30   Prior Level of Function: walking 3 miles 4 days a week with no pain, no pain or restriction with daily and work activities    Visits from [place] of Care: 12    Missed Visits: 1  Reporting Period : 10/31/18 to 18    Long Term Goals: LTG- To be accomplished in 6 week(s):  1.  Pt will improve trunk rotation to 15 in or less in hooklying bilaterally to indicate mobility needed for gait and daily activities. .  Eval:Right 19 in Left 19.5 in   Last PN:17.5 in bilat  Current: Almost MET 15.5 in bilaterally      2.  Pt will be able to bridge >10 times to full height with proper form and no hamstring cramping to indicate increased functional glut and hamstring strength needed for daily activities. Eval:decreased height and cramping in hamstring with 2nd rep  Last PN: Bridge to full height without cramping, fatigued after 4-5 reps  Current: MET but fatigues MET per pt report      3.  Pt will be able to sit >90 min and stand with no LBP or knee pain to increase tolerance to work and daily activities. Eval:pain with standing after sitting 30 min   Last PN: 45 min sitting tolerance   Current:      4.  Pt FOTO score will increase by 5 points to show improvement in function.   Eval:68 - Knee  Last PN:  Knee 55, discussed with pt how may have overestimated abilities at intake    Current: 72 - almost MET      G-Codes (GP)  Mobility   [id number] Goal  CJ= 20-39%  D/C  CJ= 20-39%  The severity rating is based on clinical judgment and the FOTO score. Assessment/ Summary of Care:   Pt has tolerated treatment very well. Reports no pain or limitations at present. Expressed desire to join fitness program to maintain functional gains. Treatment has focussed on pelvis repositioning, thoracic rotation, glut and hamstring facilitation and general mobility and stability.     RECOMMENDATIONS:  [x]Discontinue therapy: [x]Patient has reached or is progressing toward set goals      []Patient is non-compliant or has abdicated      []Due to lack of appreciable progress towards set goals    Wilbert Urrutia PT, JOSE 12/17/2018 12:01 PM

## 2019-08-19 ENCOUNTER — HOSPITAL ENCOUNTER (OUTPATIENT)
Dept: PHYSICAL THERAPY | Age: 69
Discharge: HOME | End: 2019-08-19
Payer: MEDICARE

## 2019-08-19 PROCEDURE — 97110 THERAPEUTIC EXERCISES: CPT

## 2019-08-19 PROCEDURE — 97530 THERAPEUTIC ACTIVITIES: CPT

## 2019-08-19 PROCEDURE — 97162 PT EVAL MOD COMPLEX 30 MIN: CPT

## 2019-08-19 NOTE — PROGRESS NOTES
[facility] at the [address], [place], [address] [facility]  Phone: [phone]      Fax:  [phone]       Plan of Care/ Statement of Necessity for Physical Therapy Services      Patient name: Yenifer Plata Start of Care: 2019   Referral source: Valri Gosselin, MD : 1950    Medical Diagnosis: Left shoulder pain [M25.512]   Onset Date:~3 weeks ago    Treatment Diagnosis: cervical pain, left shoulder pain    Prior Hospitalization: see medical history Provider#: [id number]   Medications: Verified on Patient summary List    Comorbidities: BMI >30, HTN, previous surgeries, hx of LBP   Prior Level of Function: previously unrestricted with daily and work related activities including reaching, lifting  Regular exercise 3 days per week      The Plan of Care and following information is based on the information from the initial evaluation. Assessment/ key information:   Pt is a pleasant 68 y.o. Male with C/C of insidious onset left sided cervical pain and left shoulder pain. Pt is right hand dominant and reports onset with waking ~3 weeks ago. Denies red flags and no radicular sx present. Signs/Symptoms consistent with mechanical cervical and shoulder pain. Presents with marked decreased cervical ROM, decreased trunk rot, scapular dyskinesia, myofascial restrictions and decreased strength. Evaluation Complexity History HIGH Complexity :3+ comorbidities / personal factors will impact the outcome/ POC ; Examination HIGH Complexity : 4+ Standardized tests and measures addressing body structure, function, activity limitation and / or participation in recreation  ;Presentation MEDIUM Complexity : Evolving with changing characteristics  ; Clinical Decision Making MEDIUM Complexity : FOTO score of 26-74  Overall Complexity Rating: MEDIUM  Problem List: pain affecting function, decrease ROM, decrease strength, impaired gait/ balance, decrease ADL/ functional abilitiies, decrease activity tolerance and decrease flexibility/ joint mobility   Treatment Plan may include any combination of the following: Therapeutic exercise, Therapeutic activities, Neuromuscular re-education, Physical agent/modality, Gait/balance training, Manual therapy and Patient education  Patient / Family readiness to learn indicated by: asking questions, trying to perform skills and interest  Persons(s) to be included in education: patient (P)  Barriers to Learning/Limitations: None  Patient Goal (s): Get this pain gone and get back to throwing that med ball against the wal  Patient Self Reported Health Status: good  Rehabilitation Potential: good    Short Term Goals: STG- To be accomplished in 2 week(s):  1. Pt will be independent with HEP to encourage prophylaxis. Eval:dispensed     Long Term Goals: LTG- To be accomplished in 10 treatment(s):  1. Pt will . improve cervical AROM to [facility] to increase tolerance to daily activities and increase safety with driving. Eval:    Right Left   Cervical Flex: 31       Ext: 30       Rot 42 36   Lat Flex 18 15         2. Pt will be able to be able to reach overhead and across body without pain or restriction in left shoulder and scapula. Eval:pain, increased pain with reaching multiple reps.      3. Pt will improve trunk rotation to 16in or less in hooklying to indicate thoracic mobility needed for daily activities  Eval:19 in bilaterally     4. Pt FOTO score will increase by 9 points to show improvement in function. Eval:65  Current: will address at visit 5    Frequency / Duration: Patient to be seen 10 treatments.     Patient/ Caregiver education and instruction: Diagnosis, prognosis, self care, activity modification and exercises   [x]  Plan of care has been reviewed with PTA    Certification Period: 8/19/19 - 10/18/19  Joaquin Alaniz PT, DPT 8/19/2019 4:26 PM  _____________________________________________________________________  I certify that the above Therapy Services are being furnished while the patient is under my care. I agree with the treatment plan and certify that this therapy is necessary.     Physician's Signature:____________Date:_________TIME:________    [employer], Date and Time must be completed for valid certification **    Please sign and return to [facility] at the [address], [facility], [address] [facility]       Phone: [phone]      Fax:  [phone]

## 2019-08-19 NOTE — PROGRESS NOTES
PT DAILY TREATMENT NOTE    Patient Name: Avelino Smith  Date:2019  : 1950  [x]  Patient  Verified  Payor: [place] MEDICARE / Plan: [place] MEDICARE PART A & B / Product Type: Medicare /    In time: 1:58 pm  Out time:2:40 pm   Total Treatment Time (min): 42  Total Timed Codes (min): 23  1:1 Treatment Time ( [address] only): 42   Visit #: 1 of 12    Treatment Area: Left shoulder pain [M25.512]    SUBJECTIVE  Pain Level (0-10 scale): 2-3  Any medication changes, allergies to medications, adverse drug reactions, diagnosis change, or new procedure performed?: [x] No    [] Yes (see summary sheet for update)  Subjective functional status/changes:   [] No changes reported    Hx Present Illness: C/C of Cervical and left shoulder pain   Onset ~3 weeks ago, insidious in onset  Right hand dominant   Denies numbness and tingling and radicular sx at this time  No red flags   No imaging at this time  Increase in pain with reaching, lifting objects, turning head, intermittently during day with daily activities. Initially disturbed sleep      Pain:  _7-8__/10 max       __2_/10 min     _2-3___/10 now    Location: left side of cervical spine, scapula, UT     [x] Sharp    [] Dull      [] Burning     []  Aching     [] Throbbing      [] Tingling     [x] Other: feels like a cramping        [x]  Constant                   [x] Intermittent        Previous treatment:   None     PMHX: PMHx/Surgical Hx:  please     Social/Recreation/Work: Work Hx: [employer] 60 -    Living Situation: Lives with spouse, 2 story home   Recreational Activities: exercise with sports performance, walking 3-4 days a week      Patient Goal(s): \"Get this pain gone and get back to throwing that med ball against the wall. \"    Cognition: A & O x 4    OBJECTIVE    Modalities Rationale:     min [] Estim, type/location:                                      []  att     []  unatt     []  w/US     []  w/ice    []  w/heat    min []  Mechanical Traction: type/lbs                   []  pro   []  sup   []  int   []  cont    []  before manual    []  after manual    min []  Ultrasound, settings/location:      min []  Iontophoresis w/ dexamethasone, location:                                               []  take home patch       []  in clinic    min []  Ice     []  Heat    location/position:     min []  Vasopneumatic Device, press/temp:     min []  Other:    [] Skin assessment post-treatment (if applicable):    []  intact    []  redness- no adverse reaction     []redness  adverse reaction:        19 min [x]Eval                  []Re-Eval     10 min Therapeutic Exercise:  [] See flow sheet : reviewed HEP   Rationale: increase ROM, increase strength, improve coordination and increase proprioception to improve the patients ability to perform daily activities with decreased pain and symptom levels            With   [] TE   [x] TA - 13 min   [] neuro   [] other: Patient Education: [x] Review HEP    [] Progressed/Changed HEP based on:   [] positioning   [] body mechanics   [] transfers   [] heat/ice application    [x] other: pt education regarding exam findings, anatomy involved and POC     Other Objective/Functional Measures:    Movement/gait:      Visual Inspection: Thoracic: WFL, slight kyphosis and forward head posture  Lumbar: WFL  Shoulder/Scapula: bilat scap abducted and IR    Palpation: decreased right apical expansion  Bilateral rib flare  pec and lat stiffness and TTP bilaterally   TTP and trigger points in bilateral UT, LS, cervical paraspinals  Decreased cervical mobility with side glides                            AROM/PROM Right Left   Cervical Flex: 31     Ext: 30     Rot 42 36   Lat Flex 18 15        Shoulder Flex    Abd    IR T10-11 T7   ER T2 T2   Scapular winging on the left with IR     Strength Right Left   Shoulder Flex 4 4   abd 4 4   Scaption  4 4-   IR 5 5   ER 4 4            Special Tests Right Left   Spulrlings - -   Full Can - -   Neer - -   HK - -        Trunk Rot 19 in 19 in      Other Right Left   HG IR 74 82   Shoulder Horizontal Abduction  39 15                             Other Comments: Standing Forward Flexion fingers to floor  Gillet: hypomobile bilaterally   Scapular Rhythm: dyskinsia and dumping Left >Right      Pain Level (0-10 scale) post treatment: 1-2    ASSESSMENT/Changes in Function:   Pt is a pleasant 68 y.o. Male with C/C of insidious onset left sided cervical pain and left shoulder pain. Pt is right hand dominant and reports onset with waking ~3 weeks ago. Denies red flags and no radicular sx present. Signs/Symptoms consistent with mechanical cervical and shoulder pain. Presents with marked decreased cervical ROM, decreased trunk rot, scapular dyskinesia, myofascial restrictions and decreased strength. Patient will continue to benefit from skilled PT services to modify and progress therapeutic interventions, address functional mobility deficits, address ROM deficits, address strength deficits, analyze and address soft tissue restrictions, analyze and cue movement patterns, analyze and modify body mechanics/ergonomics, assess and modify postural abnormalities and instruct in home and community integration to attain remaining goals. [x]  See Plan of Care  []  See progress note/recertification  []  See Discharge Summary         Progress towards goals / Updated goals:  Short Term Goals: STG- To be accomplished in 2 week(s):  1. Pt will be independent with HEP to encourage prophylaxis. Eval:dispensed    Long Term Goals: LTG- To be accomplished in 10 treatment(s):  1. Pt will . improve cervical AROM to [facility] to increase tolerance to daily activities and increase safety with driving. Eval:   Right Left   Cervical Flex: 31       Ext: 30       Rot 42 36   Lat Flex 18 15       2. Pt will be able to be able to reach overhead and across body without pain or restriction in left shoulder and scapula.   Eval:pain, increased pain with reaching multiple reps. 3.  Pt will improve trunk rotation to 16in or less in hooklying to indicate thoracic mobility needed for daily activities  Eval:19 in bilaterally    4. Pt FOTO score will increase by 9 points to show improvement in function.   Eval:65  Current: will address at visit 5      PLAN  [x]  Upgrade activities as tolerated     []  Continue plan of care  []  Update interventions per flow sheet       []  Discharge due to:_  []  Other:_      Mami Landon, PT, DPT 8/19/2019  1:55 PM    Future Appointments   Date Time Provider Quincy Mari   7/7/2020  8:30 AM [address]   7/14/2020  8:30 AM Kellen Bravo MD [facility]

## 2019-08-28 ENCOUNTER — HOSPITAL ENCOUNTER (OUTPATIENT)
Dept: PHYSICAL THERAPY | Age: 69
Discharge: HOME | End: 2019-08-28
Payer: MEDICARE

## 2019-08-28 PROCEDURE — 97140 MANUAL THERAPY 1/> REGIONS: CPT

## 2019-08-28 PROCEDURE — 97110 THERAPEUTIC EXERCISES: CPT

## 2019-08-28 NOTE — PROGRESS NOTES
PT DAILY TREATMENT NOTE    Patient Name: Jyoti Torres  Date:2019  : 1950  [x]  Patient  Verified  Payor: [place] MEDICARE / Plan: [place] MEDICARE PART A & B / Product Type: Medicare /    In time:5:55  Out time:6:50  Total Treatment Time (min): 55  Total Timed Codes (min): 55  1:1 Treatment Time ([facility] only): 39   Visit #: 2 of 10    Treatment Area: Left shoulder pain [M25.512]    SUBJECTIVE  Pain Level (0-10 scale): 0  Any medication changes, allergies to medications, adverse drug reactions, diagnosis change, or new procedure performed?: [x] No    [] Yes (see summary sheet for update)  Subjective functional status/changes:   [] No changes reported  \"Sore and tight. I have been doing the exercises at home. \"    OBJECTIVE    45 min Therapeutic Exercise:  [x] See flow sheet :   Rationale: increase ROM and increase strength to improve the patients ability to perform daily activities with decreased pain and symptom levels      10 min Manual Therapy:  STM to levator, UT, gentle UT stretch in supine, manual pec stretch, rib mobs with breaths    Rationale: decrease pain, increase ROM and increase tissue extensibility to improve the patients ability to perform daily activities with decreased pain and symptom levels    With   [] TE   [] TA   [] neuro   [] other: Patient Education: [x] Review HEP    [] Progressed/Changed HEP based on:   [] positioning   [] body mechanics   [] transfers   [] heat/ice application    [] other:      Other Objective/Functional Measures:   Decreased rib mobility noted  Cues to decrease UT compensation with prone exercises     Pain Level (0-10 scale) post treatment: 0    ASSESSMENT/Changes in Function: Good tolerance to exercises with pt repoting less tightness at end of session. Very challenged in QP today with easily fatiguing. Continues to demonstrate decreased rib mobility and scapular strength.      Patient will continue to benefit from skilled PT services to modify and progress therapeutic interventions, address functional mobility deficits, address ROM deficits, address strength deficits, analyze and address soft tissue restrictions, analyze and cue movement patterns, analyze and modify body mechanics/ergonomics, assess and modify postural abnormalities and instruct in home and community integration to attain remaining goals. []  See Plan of Care  []  See progress note/recertification  []  See Discharge Summary         Progress towards goals / Updated goals:  Short Term Goals: STG- To be accomplished in 2 week(s):  1.  Pt will be independent with HEP to encourage prophylaxis. Eval:dispensed  Current: compliance, updated today      Long Term Goals: LTG- To be accomplished in 10 treatment(s):  1.  Pt will . improve cervical AROM to [facility] to increase tolerance to daily activities and increase safety with driving. Eval:    Right Left   Cervical Flex: 31       Ext: 30       Rot 42 36   Lat Flex 18 15      Current:      2.  Pt will be able to be able to reach overhead and across body without pain or restriction in left shoulder and scapula. Eval:pain, increased pain with reaching multiple reps.   Current:      3.  Pt will improve trunk rotation to 16in or less in hooklying to indicate thoracic mobility needed for daily activities  Eval:19 in bilaterally  Current:      4.  Pt FOTO score will increase by 9 points to show improvement in function.   Eval:65  Current: will address at visit 5      PLAN  []  Upgrade activities as tolerated     [x]  Continue plan of care  []  Update interventions per flow sheet       []  Discharge due to:_  []  Other:_      Sola Felix 8/28/2019  6:01 PM    Future Appointments   Date Time Provider Quincy Mari   9/5/2019  9:00 AM Sabine Claudio PT, DPT [facility]   9/12/2019  8:15 AM Sabine Claudio PT, DPT [facility]   9/19/2019  8:15 AM Sabine Claudio PT, DPT [facility]   9/26/2019  8:15 AM Sabine Claudio PT, DPT [facility]   10/3/2019  8:15 AM Sabine Claudio PT, JOSE MIHPJO ANNW [facility]   10/10/2019  8:15 AM Vijay Hernandez PT, JOSE SHAHHPHOLLI [facility]   10/17/2019  8:15 AM Vijay Hernandez PT, JOSE SURESH [facility]   10/24/2019  8:15 AM Vijay Hernandez PT, DPDANITZA [facility] ANNW [facility]   7/7/2020  8:30 AM [facility]   7/14/2020  8:30 AM Srinivasan Amin MD [address]

## 2019-09-05 ENCOUNTER — HOSPITAL ENCOUNTER (OUTPATIENT)
Dept: PHYSICAL THERAPY | Age: 69
Discharge: HOME | End: 2019-09-05
Payer: MEDICARE

## 2019-09-05 PROCEDURE — 97140 MANUAL THERAPY 1/> REGIONS: CPT

## 2019-09-05 PROCEDURE — 97110 THERAPEUTIC EXERCISES: CPT

## 2019-09-05 NOTE — PROGRESS NOTES
PT DAILY TREATMENT NOTE    Patient Name: Genaro Tenorio  Date:2019  : 1950   [x]  Patient  Verified  Payor: Chyna Barry / Plan: [place] MEDICARE PART A & B / Product Type: Medicare /    In time:8:52  Out time:9:45  Total Treatment Time (min): 53  Total Timed Codes (min): 53  1:1 Treatment Time ([facility] only): 50  Visit #: 3 of 10    Treatment Area: Left shoulder pain [M25.512]    SUBJECTIVE  Pain Level (0-10 scale): 0  Any medication changes, allergies to medications, adverse drug reactions, diagnosis change, or new procedure performed?: [x] No    [] Yes (see summary sheet for update)  Subjective functional status/changes:   [] No changes reported  \"sore. I haven't been doing the exercises lately since I have been     OBJECTIVE      41 min Therapeutic Exercise:  [x] See flow sheet :   Rationale: increase ROM and increase strength to improve the patients ability to perform daily activities with decreased pain and symptom levels    12 min Manual Therapy:  STM to UT, levator, pec minor in supine, gentle manual levator stretch    Rationale: decrease pain, increase ROM and increase tissue extensibility to improve the patients ability to perform daily activities with decreased pain and symptom levels    With   [] TE   [] TA   [] neuro   [] other: Patient Education: [x] Review HEP    [] Progressed/Changed HEP based on:   [] positioning   [] body mechanics   [] transfers   [] heat/ice application    [] other:      Other Objective/Functional Measures:   See updated cervical ROM goal below  Challenged in QP     Pain Level (0-10 scale) post treatment: 0    ASSESSMENT/Changes in Function: Good tolerance to exercises with most challenged in QP still with decreasing pec use. Pt plans to return to sports performance on Monday. Cervical ROM has improved in all planes with only minor pain with looking to left.      Patient will continue to benefit from skilled PT services to modify and progress therapeutic interventions, address functional mobility deficits, address ROM deficits, address strength deficits, analyze and address soft tissue restrictions, analyze and cue movement patterns, analyze and modify body mechanics/ergonomics, assess and modify postural abnormalities and instruct in home and community integration to attain remaining goals. []  See Plan of Care  []  See progress note/recertification  []  See Discharge Summary         Progress towards goals / Updated goals:  Short Term Goals: STG- To be accomplished in 2 week(s):  1.  Pt will be independent with HEP to encourage prophylaxis. Eval:dispensed  Current: compliance, updated today      Long Term Goals: LTG- To be accomplished in 10 treatment(s):  1.  Pt will . improve cervical AROM to [facility] to increase tolerance to daily activities and increase safety with driving. Eval:    Right Left   Cervical Flex: 31       Ext: 30       Rot 42 36   Lat Flex 18 15      Current: flexion 35*, extension 45*, right 55*, left 50* rotation progressing      2.  Pt will be able to be able to reach overhead and across body without pain or restriction in left shoulder and scapula. Eval:pain, increased pain with reaching multiple reps.   Current:      3.  Pt will improve trunk rotation to 16in or less in hooklying to indicate thoracic mobility needed for daily activities  Eval:19 in bilaterally  Current:      4.  Pt FOTO score will increase by 9 points to show improvement in function.   Eval:65  Current: will address at visit 5            PLAN  [x]  Upgrade activities as tolerated     [x]  Continue plan of care  []  Update interventions per flow sheet       []  Discharge due to:_  []  Other:_      Franco Bourgeois 9/5/2019  8:56 AM    Future Appointments   Date Time Provider Quincy Mari   9/5/2019  9:00 AM Moira William [facility]   9/12/2019  8:15 AM Aditya Cheng PT, DPT [facility]   9/19/2019  8:15 AM Aditya Cheng PT, DPT [facility]   9/26/2019  8:15 AM Aditya Cheng PT, DPT [facility]   10/3/2019  8:15 AM Sabine Claudio PT, DPT [facility]   10/10/2019  8:15 AM Sabine Claudio PT, DPT [facility]   10/17/2019  8:15 AM Sabine Claudio PT, DPT [facility]   10/24/2019  8:15 AM Sabine Claudio PT, DPT [facility]   7/7/2020  8:30 AM [facility]   7/14/2020  8:30 AM MD Darling Avery

## 2019-09-12 ENCOUNTER — HOSPITAL ENCOUNTER (OUTPATIENT)
Dept: PHYSICAL THERAPY | Age: 69
Discharge: HOME | End: 2019-09-12
Payer: MEDICARE

## 2019-09-12 PROCEDURE — 97110 THERAPEUTIC EXERCISES: CPT

## 2019-09-12 PROCEDURE — 97140 MANUAL THERAPY 1/> REGIONS: CPT

## 2019-09-12 NOTE — PROGRESS NOTES
[facility] at the [address], [facility], [address] [facility]  Phone: [phone]      Fax:  [phone]    Progress Note  Patient name: Darrell Pack Start of Care: 2019   Referral source: Autumn Mohan MD : 1950               Medical Diagnosis: Left shoulder pain [M25.512]    Onset Date:~3 weeks ago               Treatment Diagnosis: cervical pain, left shoulder pain    Prior Hospitalization: see medical history Provider#: [id number]   Medications: Verified on Patient summary List    Comorbidities: BMI >30, HTN, previous surgeries, hx of LBP   Prior Level of Function: previously unrestricted with daily and work related activities including reaching, lifting  Regular exercise 3 days per week    Visits from Start of Care: 4    Missed Visits: 0    Progress Toward Goals:   Short Term Goals: STG- To be accomplished in 2 week(s):  1.  Pt will be independent with HEP to encourage prophylaxis. Eval:dispensed  Current: increased compliance per pt report     Long Term Goals: LTG- To be accomplished in 10 treatment(s):  1.  Pt will . improve cervical AROM to [facility] to increase tolerance to daily activities and increase safety with driving. Eval:    Right Left   Cervical Flex: 31       Ext: 30       Rot 42 36   Lat Flex 18 15      Current: progressing        Right Left   Cervical Flex: 35       Ext: 45       Rot 55 50   Lat Flex 30 18         2.  Pt will be able to be able to reach overhead and across body without pain or restriction in left shoulder and scapula. Eval:pain, increased pain with reaching multiple reps.   Current: progressing per pt report     3.  Pt will improve trunk rotation to 16in or less in hooklying to indicate thoracic mobility needed for daily activities  Eval:19 in bilaterally  Current: Progressing 17 in bilaterally      4.  Pt FOTO score will increase by 9 points to show improvement in function.   Eval:65  Current: will address at visit 5     Key Functional Changes:   Pt has been seen for 4 visits including initial evaluation with C/C C/C of insidious onset left sided cervical pain and left shoulder pain. Pt reports making good progress. At present has more\"stiffness\" than pain. Has participated in 1-2 training sessions in past 2 weeks with no increase in pain. Is most restricted in lateral flexion and trunk rotation. Very challenged with thoracic retraction and rib mobility as well as engaging obliques. Could benefit from continued skilled PT to address mobility and tolerance to daily activities. Updated Goals: to be achieved in 6 treatments:  Same as unmet above     ASSESSMENT/RECOMMENDATIONS:  [x]Continue therapy per initial plan/protocol at a frequency of  6 visits   []Continue therapy with the following recommended changes:_____________________      _____________________________________________________________________  []Discontinue therapy progressing towards or have reached established goals  []Discontinue therapy due to lack of appreciable progress towards goals  []Discontinue therapy due to lack of attendance or compliance  []Await Physician's recommendations/decisions regarding therapy  []Other:________________________________________________________________    Thank you for this referral.   Francis Delgado, PT, DPT 9/12/2019 12:31 PM  NOTE TO PHYSICIAN:  PLEASE COMPLETE THE ORDERS BELOW AND   FAX TO [facility]: [phone]  If you are unable to process this request in 24 hours please contact our office: [phone]        []  I have read the above report and request that my patient continue as recommended. []  I have read the above report and request that my patient continue therapy with the following changes/special instructions:________________________________________  []I have read the above report and request that my patient be discharged from therapy.     [facility] Signature:____________Date:_________TIME:________    [facility], Date and Time must be completed for valid certification **

## 2019-09-12 NOTE — PROGRESS NOTES
PT DAILY TREATMENT NOTE    Patient Name: Peyton Bhandari  Date:2019  : 1950  [x]  Patient  Verified  Payor: Juany Cintron / Plan: [place] / Product Type: Medicare /    In time:8:15 am  Out time:9:15 am  Total Treatment Time (min): 60  Total Timed Codes (min): 60  1:1 Treatment Time ( [address] only): 40   Visit #: 4 of 10    Treatment Area: Left shoulder pain [M25.512]    SUBJECTIVE  Pain Level (0-10 scale): 0  Any medication changes, allergies to medications, adverse drug reactions, diagnosis change, or new procedure performed?: [x] No    [] Yes (see summary sheet for update)  Subjective functional status/changes:   [] No changes reported  \"Just stiff, very stiff. \"    OBJECTIVE    Modalities Rationale:      min [] Estim, type/location:                                      []  att     []  unatt     []  w/US     []  w/ice    []  w/heat    min []  Mechanical Traction: type/lbs                   []  pro   []  sup   []  int   []  cont    []  before manual    []  after manual    min []  Ultrasound, settings/location:      min []  Iontophoresis w/ dexamethasone, location:                                               []  take home patch       []  in clinic    min []  Ice     []  Heat    location/position:     min []  Vasopneumatic Device, press/temp:     min []  Other:    [] Skin assessment post-treatment (if applicable):    []  intact    []  redness- no adverse reaction     []redness  adverse reaction:          40 min Therapeutic Exercise:  [x] See flow sheet :   Rationale: increase ROM, increase strength, improve coordination and increase proprioception to improve the patients ability to perform daily activities with decreased pain and symptom levels      20 min Manual Therapy:  STM to SCM  Gentle manual distraction   Sibson's fascial release bilaterally  Right apical expansion   2-person rib mobilization  With inhale/exhale  MET correction for decreased left 1rst rib mobility    Rationale: decrease pain, increase ROM and increase tissue extensibility to improve the patients ability to perform daily activities with decreased pain and symptom levels            With   [] TE   [] TA   [] neuro   [] other: Patient Education: [x] Review HEP    [] Progressed/Changed HEP based on:   [] positioning   [] body mechanics   [] transfers   [] heat/ice application    [] other:      Other Objective/Functional Measures:   See goals    Decreased first rib mobility on the left  Limited apical expansion bilaterally      Pain Level (0-10 scale) post treatment: 0    ASSESSMENT/Changes in Function:   Pt has been seen for 4 visits including initial evaluation with C/C C/C of insidious onset left sided cervical pain and left shoulder pain. Pt reports making good progress. At present has more\"stiffness\" than pain. Has participated in 1-2 training sessions in past 2 weeks with no increase in pain. Is most restricted in lateral flexion and trunk rotation. Very challenged with thoracic retraction and rib mobility as well as engaging obliques. Could benefit from continued skilled PT to address mobility and tolerance to daily activities. Patient will continue to benefit from skilled PT services to modify and progress therapeutic interventions, address functional mobility deficits, address ROM deficits, address strength deficits, analyze and address soft tissue restrictions, analyze and cue movement patterns, analyze and modify body mechanics/ergonomics, assess and modify postural abnormalities and instruct in home and community integration to attain remaining goals. []  See Plan of Care  []  See progress note/recertification  []  See Discharge Summary         Progress towards goals / Updated goals:  Short Term Goals: STG- To be accomplished in 2 week(s):  1.  Pt will be independent with HEP to encourage prophylaxis.   Eval:dispensed  Current: increased compliance per pt report     Long Term Goals: LTG- To be accomplished in 10 treatment(s):  1.  Pt will . improve cervical AROM to [facility] to increase tolerance to daily activities and increase safety with driving. Eval:    Right Left   Cervical Flex: 31       Ext: 30       Rot 42 36   Lat Flex 18 15      Current: progressing        Right Left   Cervical Flex: 35       Ext: 45       Rot 55 50   Lat Flex 30 18        2.  Pt will be able to be able to reach overhead and across body without pain or restriction in left shoulder and scapula. Eval:pain, increased pain with reaching multiple reps.   Current: progressing per pt report     3.  Pt will improve trunk rotation to 16in or less in hooklying to indicate thoracic mobility needed for daily activities  Eval:19 in bilaterally  Current: Progressing 17 in bilaterally      4.  Pt FOTO score will increase by 9 points to show improvement in function.   Eval:65  Current: will address at visit 5      PLAN  [x]  Upgrade activities as tolerated     []  Continue plan of care  []  Update interventions per flow sheet       []  Discharge due to:_  []  Other:_      Sumeet Houston PT, DPT 9/12/2019  8:23 AM    Future Appointments   Date Time Provider Quin[place] Mari   9/19/2019  8:15 AM Carolina Garcia PT, DPT [facility]   9/26/2019  8:15 AM Carolina Garcia PT, DPT [facility]   10/3/2019  8:15 AM Carolina Garcia PT, DPT [facility]   10/10/2019  8:15 AM Carolina Garcia PT, DPT [facility]   10/17/2019  8:15 AM Carolina Garcia PT, DPT [facility]   10/24/2019  8:15 AM Carolina Garcia PT, DPT [facility]   7/7/2020  8:30 AM [facility] NUR[facility]ARG[facility]ITA SANTO   7/14/2020  8:30 AM [facility] [place] [address]

## 2019-09-19 ENCOUNTER — HOSPITAL ENCOUNTER (OUTPATIENT)
Dept: PHYSICAL THERAPY | Age: 69
Discharge: HOME | End: 2019-09-19
Payer: MEDICARE

## 2019-09-19 PROCEDURE — 97140 MANUAL THERAPY 1/> REGIONS: CPT

## 2019-09-19 PROCEDURE — 97110 THERAPEUTIC EXERCISES: CPT

## 2019-09-19 NOTE — PROGRESS NOTES
PT DAILY TREATMENT NOTE    Patient Name: Janan Pallas  Date:2019  : 1950  [x]  Patient  Verified  Payor: Felix Kunz / Plan: [place] MEDICARE PART A & B / Product Type: Medicare /    In time: 8:06 am  Out time:9:02 am  Total Treatment Time (min): 54  Total Timed Codes (min): 54  1:1 Treatment Time ( [address] only): 47   Visit #: 5 of 10    Treatment Area: Left shoulder pain [M25.512]    SUBJECTIVE  Pain Level (0-10 scale): 0-1  Any medication changes, allergies to medications, adverse drug reactions, diagnosis change, or new procedure performed?: [x] No    [] Yes (see summary sheet for update)  Subjective functional status/changes:   [] No changes reported  \"A whole lot better than it was. Just a little difficulty turning to either direction. \"    OBJECTIVE    Modalities Rationale:     min [] Estim, type/location:                                      []  att     []  unatt     []  w/US     []  w/ice    []  w/heat    min []  Mechanical Traction: type/lbs                   []  pro   []  sup   []  int   []  cont    []  before manual    []  after manual    min []  Ultrasound, settings/location:      min []  Iontophoresis w/ dexamethasone, location:                                               []  take home patch       []  in clinic    min []  Ice     []  Heat    location/position:     min []  Vasopneumatic Device, press/temp:     min []  Other:    [] Skin assessment post-treatment (if applicable):    []  intact    []  redness- no adverse reaction     []redness  adverse reaction:          40 min Therapeutic Exercise:  [x] See flow sheet :   Rationale: increase ROM, increase strength, improve coordination and increase proprioception to improve the patients ability to perform daily activities with decreased pain and symptom levels    14 min Manual Therapy:    STM to SCM  Gentle manual distraction   Sibson's fascial release bilaterally  Right apical expansion   1-person rib mobilization with inhale-exhale    Rationale: decrease pain, increase ROM and increase tissue extensibility to improve the patients ability to perform daily activities with decreased pain and symptom levels          With   [] TE   [] TA   [] neuro   [] other: Patient Education: [x] Review HEP    [] Progressed/Changed HEP based on:   [] positioning   [] body mechanics   [] transfers   [] heat/ice application    [] other:      Other Objective/Functional Measures:   Cervical Rot AROM Right:  51 Left: 54     Pain Level (0-10 scale) post treatment: 0    ASSESSMENT/Changes in Function:   Noted decreased cervical lordosis with manual. Bilateral rib flare. Increased accessory muscle use with inhalation, especially lumbar ext, pecs and anterior cervical musculature. Provided band for home use for rows, ext and triceps ext. Patient will continue to benefit from skilled PT services to modify and progress therapeutic interventions, address functional mobility deficits, address ROM deficits, address strength deficits, analyze and address soft tissue restrictions, analyze and cue movement patterns, analyze and modify body mechanics/ergonomics, assess and modify postural abnormalities and instruct in home and community integration to attain remaining goals. []  See Plan of Care  []  See progress note/recertification  []  See Discharge Summary         Progress towards goals / Updated goals:  Short Term Goals: STG- To be accomplished in 2 week(s):  1.  Pt will be independent with HEP to encourage prophylaxis. Eval:dispensed  Last PN: increased compliance per pt report  Current: MET per pt report    Long Term Goals: LTG- To be accomplished in 10 treatment(s):  1.  Pt will . improve cervical AROM to [facility] to increase tolerance to daily activities and increase safety with driving.   Eval:    Right Left   Cervical Flex: 31       Ext: 30       Rot 42 36   Lat Flex 18 15      Last PN: progressing        Right Left   Cervical Flex: 35       Ext: 45       Rot 55 50   Lat Flex 30 18      Current:      2.  Pt will be able to be able to reach overhead and across body without pain or restriction in left shoulder and scapula. Eval:pain, increased pain with reaching multiple reps.   Last PN: progressing per pt report  Current:      3.  Pt will improve trunk rotation to 16in or less in hooklying to indicate thoracic mobility needed for daily activities  Eval:19 in bilaterally  Last PN: Progressing 17 in bilaterally   Current:     4.  Pt FOTO score will increase by 9 points to show improvement in function.   Eval:65  Last PN: will address at visit 5  Current: tablet mal unction reassess at visit 6     PLAN  [x]  Upgrade activities as tolerated     []  Continue plan of care  []  Update interventions per flow sheet       []  Discharge due to:_  []  Other:_      Ed Natalie PT, DPT 9/19/2019  8:09 AM    Future Appointments   Date Time Provider [place] Mari   9/19/2019  8:15 AM Kishor Johnson PT, DPT [facility]   9/26/2019  8:15 AM Kishor Johnson PT, DPT [facility]   10/3/2019  8:15 AM Kishor Johnson PT, DPT [facility]   10/10/2019  8:15 AM Kishor Johnson PT, DPT [facility]   10/17/2019  8:15 AM Kishor Johnson PT, DPT [facility]   10/24/2019  8:15 AM Kishor Johnson PT, DPT [facility]   7/7/2020  8:30 AM [facility]   7/14/2020  8:30 AM MD Darling Barr

## 2019-09-26 ENCOUNTER — HOSPITAL ENCOUNTER (OUTPATIENT)
Dept: PHYSICAL THERAPY | Age: 69
Discharge: HOME | End: 2019-09-26
Payer: MEDICARE

## 2019-09-26 PROCEDURE — 97110 THERAPEUTIC EXERCISES: CPT

## 2019-09-26 PROCEDURE — 97140 MANUAL THERAPY 1/> REGIONS: CPT

## 2019-09-26 NOTE — PROGRESS NOTES
PT DAILY TREATMENT NOTE    Patient Name: Tawanda Fowler  Date:2019  : 1950  [x]  Patient  Verified  Payor: Christi Abrams / Plan: [place] MEDICARE PART A & B / Product Type: Medicare /     In time:8:10 am  Out time:9:08 am  Total Treatment Time (min): 58  Total Timed Codes (min): 58  1:1 Treatment Time ( [address] only): 42   Visit #: 6 of 10    Treatment Area: Left shoulder pain [M25.512]    SUBJECTIVE  Pain Level (0-10 scale): 0  Any medication changes, allergies to medications, adverse drug reactions, diagnosis change, or new procedure performed?: [x] No    [] Yes (see summary sheet for update)  Subjective functional status/changes:   [] No changes reported  \"No pain just stiff. Why is it stiff? \"  Reported most stiffness in the morning.     OBJECTIVE    Modalities Rationale:     min [] Estim, type/location:                                      []  att     []  unatt     []  w/US     []  w/ice    []  w/heat    min []  Mechanical Traction: type/lbs                   []  pro   []  sup   []  int   []  cont    []  before manual    []  after manual    min []  Ultrasound, settings/location:      min []  Iontophoresis w/ dexamethasone, location:                                               []  take home patch       []  in clinic    min []  Ice     []  Heat    location/position:     min []  Vasopneumatic Device, press/temp:     min []  Other:    [] Skin assessment post-treatment (if applicable):    []  intact    []  redness- no adverse reaction     []redness  adverse reaction:        43 min Therapeutic Exercise:  [x] See flow sheet :   Rationale: increase ROM, increase strength, improve coordination and increase proprioception to improve the patients ability to perform daily activities with decreased pain and symptom levels     15 min Manual Therapy:    STM to SCM  Gentle manual distraction   Sibson's fascial release bilaterally  Right apical expansion   1-person rib mobilization with inhale-exhale    Rationale: decrease pain, increase ROM and increase tissue extensibility to improve the patients ability to perform daily activities with decreased pain and symptom levels          With   [] TE   [] TA   [] neuro   [] other: Patient Education: [x] Review HEP    [] Progressed/Changed HEP based on:   [] positioning   [] body mechanics   [] transfers   [] heat/ice application    [] other:      Other Objective/Functional Measures:   Cervical Lat Flex Right: 28 Left 23  FOTO 92     Pain Level (0-10 scale) post treatment: 0    ASSESSMENT/Changes in Function:   Pt reports decrease in pain. Primarily stiffness, especially with waking and in morning. Responded very well to quadruped post med expansion. Had no pain with sport performance work out yesterday. Discussed doing exercises such as quadruped before and after. Patient will continue to benefit from skilled PT services to modify and progress therapeutic interventions, address functional mobility deficits, address ROM deficits, address strength deficits, analyze and address soft tissue restrictions, analyze and cue movement patterns, analyze and modify body mechanics/ergonomics, assess and modify postural abnormalities and instruct in home and community integration to attain remaining goals. []  See Plan of Care  []  See progress note/recertification  []  See Discharge Summary         Progress towards goals / Updated goals:  Short Term Goals: STG- To be accomplished in 2 week(s):  1.  Pt will be independent with HEP to encourage prophylaxis. Eval:dispensed  Last PN: increased compliance per pt report  Current: MET per pt report     Long Term Goals: LTG- To be accomplished in 10 treatment(s):  1.  Pt will . improve cervical AROM to [facility] to increase tolerance to daily activities and increase safety with driving.   Eval:    Right Left   Cervical Flex: 31       Ext: 30       Rot 42 36   Lat Flex 18 15      Last PN: progressing        Right Left   Cervical Flex: 35       Ext: 45       Rot 55 50   Lat Flex 30 18      Current: Progressing: Cervical Lat Flex Right: 28 Left 23     2.  Pt will be able to be able to reach overhead and across body without pain or restriction in left shoulder and scapula. Eval:pain, increased pain with reaching multiple reps.   Last PN: progressing per pt report  Current:      3.  Pt will improve trunk rotation to 16in or less in hooklying to indicate thoracic mobility needed for daily activities  Eval:19 in bilaterally  Last PN: Progressing 17 in bilaterally   Current:     4.  Pt FOTO score will increase by 9 points to show improvement in function.   Eval:65  Last PN: will address at visit 5  Current: MET 92       PLAN  [x]  Upgrade activities as tolerated     []  Continue plan of care  []  Update interventions per flow sheet       []  Discharge due to:_  []  Other:_      Vinod Rubio PT, DPT 9/26/2019  8:17 AM    Future Appointments   Date Time Provider Quin[place] Mari   10/3/2019  8:15 AM Cherylene Bo, PT, DPT [facility]   10/10/2019  8:15 AM Cherylene Humble, PT, DPT [facility]   10/17/2019  8:15 AM Cherylene Humble, PT, DPT [facility]   10/24/2019  8:15 AM Cherylene Humble, PT, DPT [facility]   7/7/2020  8:30 AM [facility]ITA [facility]   7/14/2020  8:30 [facility]

## 2019-10-03 ENCOUNTER — HOSPITAL ENCOUNTER (OUTPATIENT)
Dept: PHYSICAL THERAPY | Age: 69
Discharge: HOME | End: 2019-10-03
Payer: MEDICARE

## 2019-10-03 PROCEDURE — 97140 MANUAL THERAPY 1/> REGIONS: CPT

## 2019-10-03 PROCEDURE — 97110 THERAPEUTIC EXERCISES: CPT

## 2019-10-03 NOTE — PROGRESS NOTES
PT DAILY TREATMENT NOTE    Patient Name: Isha Mitchell  Date:10/3/2019  : 1950  [x]  Patient  Verified  Payor: [place] MEDICARE / Plan: [place] MEDICARE PART A & B / Product Type: Medicare /    In time:8:15 am  Out time:9:10 am  Total Treatment Time (min): 55  Total Timed Codes (min): 55  1:1 Treatment Time ( [address] only): 38  Visit #: 7 of 10    Treatment Area: Left shoulder pain [M25.512]    SUBJECTIVE  Pain Level (0-10 scale): 0  Any medication changes, allergies to medications, adverse drug reactions, diagnosis change, or new procedure performed?: [x] No    [] Yes (see summary sheet for update)  Subjective functional status/changes:   [] No changes reported  \"I am good. I am moving around a little easier. \"    OBJECTIVE    Modalities Rationale:     min [] Estim, type/location:                                      []  att     []  unatt     []  w/US     []  w/ice    []  w/heat    min []  Mechanical Traction: type/lbs                   []  pro   []  sup   []  int   []  cont    []  before manual    []  after manual    min []  Ultrasound, settings/location:      min []  Iontophoresis w/ dexamethasone, location:                                               []  take home patch       []  in clinic    min []  Ice     []  Heat    location/position:     min []  Vasopneumatic Device, press/temp:     min []  Other:    [] Skin assessment post-treatment (if applicable):    []  intact    []  redness- no adverse reaction     []redness  adverse reaction:          40 min Therapeutic Exercise:  [x] See flow sheet :   Rationale: increase ROM, increase strength, improve coordination and increase proprioception to improve the patients ability to perform daily activities with decreased pain and symptom levels      15 min Manual Therapy:     STM to SCM  Gentle manual distraction   Sibson's fascial release bilaterally  Right apical expansion   1-person rib mobilization with inhale-exhale    Rationale: decrease pain, increase ROM and increase tissue extensibility to improve the patients ability to perform daily activities with decreased pain and symptom levels            With   [] TE   [] TA   [] neuro   [] other: Patient Education: [x] Review HEP    [] Progressed/Changed HEP based on:   [] positioning   [] body mechanics   [] transfers   [] heat/ice application    [] other:      Other Objective/Functional Measures:   Very challenged with bilateral Rib IR or thoracic retraction - significant bilateral rib flare  Trunk rot 16.5 in   Per observation most limited in lateral flexion      Pain Level (0-10 scale) post treatment: 0    ASSESSMENT/Changes in Function:   Pt reports minimal pain in last week. Overall cervical stiffness is decreasing. No left shoulder pain in last week. Patient will continue to benefit from skilled PT services to modify and progress therapeutic interventions, address functional mobility deficits, address ROM deficits, address strength deficits, analyze and address soft tissue restrictions, analyze and cue movement patterns, analyze and modify body mechanics/ergonomics, assess and modify postural abnormalities and instruct in home and community integration to attain remaining goals. []  See Plan of Care  []  See progress note/recertification  []  See Discharge Summary         Progress towards goals / Updated goals:  Short Term Goals: STG- To be accomplished in 2 week(s):  1.  Pt will be independent with HEP to encourage prophylaxis. Eval:dispensed  Last PN: increased compliance per pt report  Current: MET per pt report     Long Term Goals: LTG- To be accomplished in 10 treatment(s):  1.  Pt will . improve cervical AROM to [facility] to increase tolerance to daily activities and increase safety with driving.   Eval:    Right Left   Cervical Flex: 31       Ext: 30       Rot 42 36   Lat Flex 18 15      Last PN: progressing        Right Left   Cervical Flex: 35       Ext: 45       Rot 55 50   Lat Flex 30 18    Current: Progressing: Cervical Lat Flex        Right: 28          Left 23     2.  Pt will be able to be able to reach overhead and across body without pain or restriction in left shoulder and scapula. Eval:pain, increased pain with reaching multiple reps.   Last PN: progressing per pt report  Current: Progressing/MET: no pain or difficulty in the last week per pt     3.  Pt will improve trunk rotation to 16in or less in hooklying to indicate thoracic mobility needed for daily activities  Eval:19 in bilaterally  Last PN: Progressing 17 in bilaterally   Current: Progressin.5in bilaterally      4.  Pt FOTO score will increase by 9 points to show improvement in function.   Eval:65  Last PN: will address at visit 5  Current: MET 92       PLAN  [x]  Upgrade activities as tolerated     []  Continue plan of care  []  Update interventions per flow sheet       []  Discharge due to:_  []  Other:_      Alma Figueroa, PT, DPT 10/3/2019  8:19 AM    Future Appointments   Date Time Provider Quin[facility] Mari   10/10/2019  8:15 AM [facility]   10/17/2019  8:15 AM Vivienne Tejeda PT, DPT [facility]   10/24/2019  8:15 AM Vivienne Tejeda PT, DPT [facility]   2020  8:30 AM [facility] PEN NURSE MARGARITA SANTO   2020  8:30 AM [facility]

## 2019-10-10 ENCOUNTER — HOSPITAL ENCOUNTER (OUTPATIENT)
Dept: PHYSICAL THERAPY | Age: 69
Discharge: HOME | End: 2019-10-10
Payer: MEDICARE

## 2019-10-10 PROCEDURE — 97530 THERAPEUTIC ACTIVITIES: CPT

## 2019-10-10 PROCEDURE — 97110 THERAPEUTIC EXERCISES: CPT

## 2019-10-10 PROCEDURE — 97140 MANUAL THERAPY 1/> REGIONS: CPT

## 2019-10-10 NOTE — PROGRESS NOTES
PT DAILY TREATMENT NOTE    Patient Name: Cassandria Soulier  Date:10/10/2019  : 1950  [x]  Patient  Verified  Payor: Concha Slight / Plan: [place] & [place] / Product Type: Medicare /    In time:8:15 am  Out time:9:15 am  Total Treatment Time (min): 60  Total Timed Codes (min): 60  1:1 Treatment Time ( [address] only): 38  Visit #: 8 of 10    Treatment Area: Left shoulder pain [M25.512]    SUBJECTIVE  Pain Level (0-10 scale): 0  Any medication changes, allergies to medications, adverse drug reactions, diagnosis change, or new procedure performed?: [x] No    [] Yes (see summary sheet for update)  Subjective functional status/changes:   [] No changes reported  \"Good,good, good. \"  Pt reports that has had only intermittent twinges. \"    OBJECTIVE    Modalities Rationale:    min [] Estim, type/location:                                      []  att     []  unatt     []  w/US     []  w/ice    []  w/heat    min []  Mechanical Traction: type/lbs                   []  pro   []  sup   []  int   []  cont    []  before manual    []  after manual    min []  Ultrasound, settings/location:      min []  Iontophoresis w/ dexamethasone, location:                                               []  take home patch       []  in clinic    min []  Ice     []  Heat    location/position:     min []  Vasopneumatic Device, press/temp:     min []  Other:    [] Skin assessment post-treatment (if applicable):    []  intact    []  redness- no adverse reaction     []redness  adverse reaction:        45 min Therapeutic Exercise:  [x] See flow sheet :   Rationale: increase ROM, increase strength, improve coordination and increase proprioception to improve the patients ability to perform daily activities with decreased pain and symptom levels        15 min Manual Therapy:     STM to SCM  Gentle manual distraction   Sibson's fascial release bilaterally  Right apical expansion   1-person rib mobilization with inhale-exhale    Rationale: decrease pain, increase ROM and increase tissue extensibility to improve the patients ability to perform daily activities with decreased pain and symptom levels          With   [] TE   [] TA   [] neuro   [] other: Patient Education: [x] Review HEP    [] Progressed/Changed HEP based on:   [] positioning   [] body mechanics   [] transfers   [] heat/ice application    [] other:      Other Objective/Functional Measures:   Trunk Rot 16.5 in bilaterally   Cervical AROM Lat Flex Right: 29 Left: 25     Pain Level (0-10 scale) post delisa atment: 0    ASSESSMENT/Changes in Function:   Pt is demonstrating good progress with PT. Cerivcal ROM is gradually improving with each visit. Has had significant decrease in pain in cervical spine and left shoulder. Has started to return to structured exercise with only occasional pain with pushing and pulling. At present is most restricted in cervical lateral flexion. Discussed with pt continuing 1 timer perweek for next 2 weeks to work on ROM and transition to HEP. Patient will continue to benefit from skilled PT services to modify and progress therapeutic interventions, address functional mobility deficits, address ROM deficits, address strength deficits, analyze and address soft tissue restrictions, analyze and cue movement patterns, analyze and modify body mechanics/ergonomics, assess and modify postural abnormalities and instruct in home and community integration to attain remaining goals. []  See Plan of Care  [x]  See progress note/recertification  []  See Discharge Summary         Progress towards goals / Updated goals:  Short Term Goals: STG- To be accomplished in 2 week(s):  1.  Pt will be independent with HEP to encourage prophylaxis. Eval:dispensed  Last PN: increased compliance per pt report  Current: MET per pt report     Long Term Goals: LTG- To be accomplished in 10 treatment(s):  1.  Pt will . improve cervical AROM to [facility] to increase tolerance to daily activities and increase safety with driving. Eval:    Right Left   Cervical Flex: 31       Ext: 30       Rot 42 36   Lat Flex 18 15      Last PN: progressing        Right Left   Cervical Flex: 35       Ext: 45       Rot 55 50   Lat Flex 30 18      Current: Progressing: Cervical AROM Lat Flex Right: 29 Left: 25      2.  Pt will be able to be able to reach overhead and across body without pain or restriction in left shoulder and scapula. Eval:pain, increased pain with reaching multiple reps.   Last PN: progressing per pt report  Current: Progressing/MET: no pain or difficulty in the last week per pt     3.  Pt will improve trunk rotation to 16in or less in hooklying to indicate thoracic mobility needed for daily activities  Eval:19 in bilaterally  Last PN: Progressing 17 in bilaterally   Current: Progressin.5in bilaterally      4.  Pt FOTO score will increase by 9 points to show improvement in function.   Eval:65  Last PN: will address at visit 5  Current: MET 92       PLAN  [x]  Upgrade activities as tolerated     []  Continue plan of care  []  Update interventions per flow sheet       []  Discharge due to:_  []  Other:_      Javi Douglass PT, DPT 10/10/2019  8:30 AM    Future Appointments   Date Time Provider [place]   10/17/2019  8:15 AM Elisabeth Fraser PT, DPT [facility]   10/24/2019  8:15 AM Elisabeth Fraser PT, DPT [facility]   2020  8:30 AM [facility] PEN NURSE MARGARITA SANTO   2020  8:30 AM Dominick Edge MD [address]

## 2019-10-11 NOTE — PROGRESS NOTES
[facility] at the [address], [place], [address] [facility]  Phone: [phone]      Fax:  [phone]    Continued Plan of Care/ Re-certification for Physical Therapy Services    Patient name: Canelo George Start of Care: 2019   Referral source: Ryland Santos MD : [id number]               Medical Diagnosis: Left shoulder pain [M25.512]    Onset Date:~3 weeks ago               Treatment Diagnosis: cervical pain, left shoulder pain    Prior Hospitalization: see medical history Provider#: [id number]   Medications: Verified on Patient summary List    Comorbidities: BMI >30, HTN, previous surgeries, hx of LBP   Prior Level of Function: previously unrestricted with daily and work related activities including reaching, lifting  Regular exercise 3 days per week    Reporting Period: 19 to 10/10/19  Visits from Start of Care: 8    Missed Visits: 0    The Plan of Care and following information is based on the patient's current status:  Progress towards goals:  Short Term Goals: STG- To be accomplished in 2 week(s):  1.  Pt will be independent with HEP to encourage prophylaxis. Eval:dispensed  Last PN: increased compliance per pt report  Current: MET per pt report     Long Term Goals: LTG- To be accomplished in 10 treatment(s):  1.  Pt will . improve cervical AROM to [facility] to increase tolerance to daily activities and increase safety with driving. Eval:    Right Left   Cervical Flex: 31       Ext: 30       Rot 42 36   Lat Flex 18 15      Last PN: progressing        Right Left   Cervical Flex: 35       Ext: 45       Rot 55 50   Lat Flex 30 18      Current: Progressing: Cervical AROM Lat Flex       Right: 29          Left: 25                 2.  Pt will be able to be able to reach overhead and across body without pain or restriction in left shoulder and scapula.   Eval:pain, increased pain with reaching multiple reps.   Last PN: progressing per pt report  Current: Progressing/MET: no pain or difficulty in the last week per pt     3.  Pt will improve trunk rotation to 16in or less in hooklying to indicate thoracic mobility needed for daily activities  Eval:19 in bilaterally  Last PN: Progressing 17 in bilaterally   Current: Progressin.5in bilaterally      4.  Pt FOTO score will increase by 9 points to show improvement in function. Eval:65  Last PN: will address at visit 5  Current: MET 92        Problems/ barriers to goal attainment: none     Problem List: pain affecting function, decrease ROM, decrease strength, impaired gait/ balance, decrease ADL/ functional abilitiies, decrease activity tolerance and decrease flexibility/ joint mobility    Treatment Plan: Therapeutic exercise, Therapeutic activities, Neuromuscular re-education, Physical agent/modality, Gait/balance training, Manual therapy and Patient education     Patient Goal (s) has been updated and includes: \"to be pain free and throw that ball against the wall. \"     Goals for this certification period to be accomplished in 2 treatments:  1. Pt will be compliant with HEP to maintain functional gains. Frequency / Duration: Patient to be seen 2 treatments:    Assessment / Recommendations:  Pt is demonstrating good progress with PT. Cerivcal ROM is gradually improving with each visit. Has had significant decrease in pain in cervical spine and left shoulder. Has started to return to structured exercise with only occasional pain with pushing and pulling. At present is most restricted in cervical lateral flexion. Discussed with pt continuing 1 timer perweek for next 2 weeks to work on ROM and transition to HEP.    Certification Period: 10/10/19 -19    Raciel Sanders PT, DPT 10/11/2019 9:55 AM    ________________________________________________________________________  I certify that the above Therapy Services are being furnished while the patient is under my care.  I agree with the treatment plan and certify that this therapy is necessary. [] I have read the above and request that my patient continue as recommended.   [] I have read the above report and request that my patient continue therapy with the following changes/special instructions: ______________________________________  [] I have read the above report and request that my patient be discharged from therapy    Physician's Signature:____________Date:_________TIME:________    ** Signature, Date and Time must be completed for valid certification **    Please sign and return to [facility] at the [address], [place], [address] [facility]  Phone: [phone]      Fax:  [phone]    \

## 2019-10-17 ENCOUNTER — APPOINTMENT (OUTPATIENT)
Dept: PHYSICAL THERAPY | Age: 69
End: 2019-10-17
Payer: MEDICARE

## 2019-10-24 ENCOUNTER — HOSPITAL ENCOUNTER (OUTPATIENT)
Dept: PHYSICAL THERAPY | Age: 69
Discharge: HOME | End: 2019-10-24
Payer: MEDICARE

## 2019-10-24 PROCEDURE — 97110 THERAPEUTIC EXERCISES: CPT

## 2019-10-24 PROCEDURE — 97140 MANUAL THERAPY 1/> REGIONS: CPT

## 2019-10-24 NOTE — PROGRESS NOTES
PT DISCHARGE DAILY NOTE AND [id number]    Date:10/24/2019  Patient name: Canelo George Start of Care: 2019   Referral source: Jessy Santos MD : 1950               Medical Diagnosis: Left shoulder pain [M25.512]    Onset Date:~3 weeks ago               Treatment Diagnosis: cervical pain, left shoulder pain    Prior Hospitalization: see medical history Provider#: [id number]   Medications: Verified on Patient summary List    Comorbidities: BMI >30, HTN, previous surgeries, hx of LBP   Prior Level of Function: previously unrestricted with daily and work related activities including reaching, lifting  Regular exercise 3 days per week    Visits from Start of Care: 9    Missed Visits: 1    Reporting Period : 19 to 10/24/19    [x]  Patient  Verified  Payor: [place] MEDICARE / Plan: [place] MEDICARE PART A & B / Product Type: Medicare /    In time:8:02 am  Out time: 9:06 am   Total Treatment Time (min): 64  Total Timed Codes (min): 64  1:1 Treatment Time ([facility] only): 64   Visit #: 9 of 10    SUBJECTIVE  Pain Level (0-10 scale): 0  Any medication changes, allergies to medications, adverse drug reactions, diagnosis change, or new procedure performed?: [x] No    [] Yes (see summary sheet for update)  Subjective functional status/changes:   [] No changes reported  \"I feel pretty good. \"    OBJECTIVE    Modality rationale:    Min Type Additional Details    [] Estim:  []Unatt       []IFC  []Premod                        []Other:  []w/ice   []w/heat  Position:  Location:    [] Estim: []Att    []TENS instruct  []NMES                    []Other:  []w/US   []w/ice   []w/heat  Position:  Location:    []  Traction: [] Cervical       []Lumbar                       [] Prone          []Supine                       []Intermittent   []Continuous Lbs:  [] before manual  [] after manual    []  Ultrasound: []Continuous   [] Pulsed                           []1MHz   []3MHz W/cm2:  Location:    []  Iontophoresis with dexamethasone Location: [] Take home patch   [] In clinic    []  Ice     []  heat  []  Ice massage  []  Laser   []  Anodyne Position:  Location:    []  Laser with stim  []  Other:  Position:  Location:    []  Vasopneumatic Device Pressure:       [] lo [] med [] hi   Temperature: [] lo [] med [] hi   [] Skin assessment post-treatment:  []intact []redness- no adverse reaction    []redness  adverse reaction:       49 min Therapeutic Exercise:  [x] See flow sheet :   Rationale: increase ROM, increase strength, improve coordination and increase proprioception to improve the patients ability to perform daily activities with decreased pain and symptom levels    15 min Manual Therapy:  STM to SCM  Gentle manual distraction   Sibson's fascial release bilaterally  Right apical expansion   1-person rib mobilization with inhale-exhale    Rationale: decrease pain, increase ROM, increase tissue extensibility, decrease trigger points and increase postural awareness to perform daily activities with decreased pain and symptom levels            With   [] TE   [] TA   [] neuro   [] other: Patient Education: [x] Review HEP    [] Progressed/Changed HEP based on:   [] positioning   [] body mechanics   [] transfers   [] heat/ice application    [] other:      Other Objective/Functional Measures:   Cervical Lateral Flexion:   Right: 31* Left: 30*     Pain Level (0-10 scale) post treatment: 0    Summary of Care:  Short Term Goals: STG- To be accomplished in 2 week(s):  1.  Pt will be independent with HEP to encourage prophylaxis. Eval:dispensed  Last PN: increased compliance per pt report  Current: MET per pt report     Long Term Goals: LTG- To be accomplished in 10 treatment(s):  1.  Pt will . improve cervical AROM to [facility] to increase tolerance to daily activities and increase safety with driving.   Eval:    Right Left   Cervical Flex: 31       Ext: 30       Rot 42 36   Lat Flex 18 15      Last PN: progressing        Right Left   Cervical Flex: 35       Ext: 45       Rot 55 50   Lat Flex 30 18      Last PN Progressing: Cervical AROM Lat Flex       Right: 29          Left: 47             Current: Almost Met:   Cervical Lateral Flexion:   Right: 31* Left: 30*       2.  Pt will be able to be able to reach overhead and across body without pain or restriction in left shoulder and scapula. Eval:pain, increased pain with reaching multiple reps.   Last PN: progressing per pt report  Current: Progressing/MET: no pain or difficulty in the last week per pt     3.  Pt will improve trunk rotation to 16in or less in hooklying to indicate thoracic mobility needed for daily activities  Eval:19 in bilaterally  Last PN: Progressing 17 in bilaterally   Current: Progressin.5in bilaterally      4.  Pt FOTO score will increase by 9 points to show improvement in function. Eval:65  Last PN: will address at visit 5  Current: MET 92     Goals for this certification period to be accomplished in 2 treatments:  1. Pt will be compliant with HEP to maintain functional gains.   Current: Progressing Dispensed HEP and pt return demonstrated understanding     ASSESSMENT/Changes in Function:   Pt is demonstrating good progress with PT. Cerivcal ROM is gradually improving with each visit. Has had significant decrease in pain in cervical spine and left shoulder. Has started to return to structured exercise with only occasional pain with pushing and pulling. At present is most restricted in cervical lateral flexion but has improved significantly since evaluation. Reports return to exercise program with no adverse effects and good self management with activities at home.      Thank you for this referral!     PLAN  [x]Discontinue therapy: []Patient has reached or is progressing toward set goals      [x]Patient is non-compliant or has abdicated      []Due to lack of appreciable progress towards set goals    Driss Gill, PT, DPT 10/24/2019  8:09 AM

## 2019-10-31 ENCOUNTER — APPOINTMENT (OUTPATIENT)
Dept: PHYSICAL THERAPY | Age: 69
End: 2019-10-31
Payer: MEDICARE

## 2021-10-01 ENCOUNTER — HOSPITAL ENCOUNTER (OUTPATIENT)
Dept: PHYSICAL THERAPY | Age: 71
Discharge: HOME | End: 2021-10-01
Attending: UROLOGY
Payer: MEDICARE

## 2021-10-01 PROCEDURE — 97110 THERAPEUTIC EXERCISES: CPT

## 2021-10-01 PROCEDURE — 97162 PT EVAL MOD COMPLEX 30 MIN: CPT

## 2021-10-01 PROCEDURE — 97530 THERAPEUTIC ACTIVITIES: CPT

## 2021-10-01 NOTE — PROGRESS NOTES
PT DAILY TREATMENT NOTE    Patient Name: Margret Ordaz  Date:10/1/2021  : 1950  [x]  Patient  Verified  Payor: Jailyn Noriega / Plan: [place] MEDICARE PART A & B / Product Type: Medicare /    In time:1:30 pm   Out time:2:14 pm   Total Treatment Time (min): 44  Total Timed Codes (min): 24  1:1 Treatment Time ([facility] only): 44   Visit #: 1 of 10    Treatment Dx: Low back pain, unspecified [M54.50]    SUBJECTIVE  Pain Level (0-10 scale): 2  Any medication changes, allergies to medications, adverse drug reactions, diagnosis change, or new procedure performed?: [x] No    [] Yes (see summary sheet for update)  Subjective functional status/changes:   [] No changes reported    Hx Present Illness: C/C of left sided LBP  Denies numbness and tingling at this time  Denies dowel and bladder involvement   Back pain insidious in onset ~4 weeks ago, gradual and insidious in onset   Increase in pain with lifting (Heavy lifting), walking - notices when done, intermittent pain with sitting and daily activities, intermittent pain with ascending and descending stairs    Scheduled to have left TKR in December of this year  Relieving: rest   Has hx of LBP  X-rays - per pt possible osteoarthritis      Pain:  _4-5__/10 max       __0_/10 min     _2___/10 now    Location: left side of low back superior to iliac crest and buttock      [] Sharp    [] Dull      [] Burning     []  Aching     [] Throbbing      [] Tingling     [x] Other: nagging      []  Constant                   [x] Intermittent        Previous treatment:   PT in the past     PMHX: PMHx/Surgical Hx:  please see past medical hx form     Social/Recreation/Work: Work Hx: works for [employer] -    Living Situation: lives with spouse, 2-story home   Recreational Activities: walking for exercise, active with grandchildren      Patient Goal(s): \"I want get right and once I get the knee I want to start working out again. \"    Cognition: A & O x 4      OBJECTIVE      20 min [x]Eval                  []Re-Eval       10 min Therapeutic Exercise:  [] See flow sheet :   Rationale: increase ROM, increase strength, improve coordination and increase proprioception to improve the patients ability to perform daily activities with decreased pain and symptom levels            With   [] TE   [x] TA - 14 min    [] neuro   [] other: Patient Education: [x] Review HEP    [] Progressed/Changed HEP based on:   [] positioning   [] body mechanics   [] transfers   [] heat/ice application    [x] other:  pt education regarding exam findings, anatomy involved, POC and need for compliance with HEP       Other Objective/Functional Measures:    Movement/gait:  Toe out bilaterally, posterior lean and right shoulder slightly lower    Visual Inspection: Thoracic: WFL  Lumbar: decreased  Shoulder/Scapula: right shoulder and scap lower, bilateral scap abducted     Palpation: Noted increased tone and tension and hypertrophy at right paraspinals  TTP at bilateral posterior hip musculature  Bilateral rib flare  Decreased apical expansion bilaterally                              AROM Right Left   Standing Forward Flexion : 3 in from floor     Extension: WFL     Trunk Rot  19.5 in 19 in                     Strength Right Left   Hip Flex 4 4   Knee Ext 5 5   Hamstrings 4- 4-   DF 5 5   PF 5 5   Hip Abd 3 3   Bridging: decreased height and increased lumbar extension, no pain     Special Tests Right Left   Slump - -   SLR - -   Passive SLR 65 60-65   YINA - +   Gaenslen's - +   Hip Scour - +   Hip Add Drop + +          Other Right Left                                       Other Comments: Standing Forward Flexion 3 in from the floor, moderate decreased reversal, decreased use of gluts to stand   Gillet: hypomobile     Pain Level (0-10 scale) post treatment: 1    ASSESSMENT/Changes in Function:   Pt is a very pleasant 66 y.o. male with C/C of Left sided LBP, gradual and insidious in onset ~4 weeks ago. Denies radicular sx at this time and no red flags reported. Signs/Symptoms consistent with mechanical LBP or lumbo-pelvic dysfunction. Did have a positive hip scour on the left for possible intraarticular hip pathology. Other objective findings include decreased ROM, glut and hamstring inhibition, decreased hamstring and glut strength, postural adaptations and soft tissue restrictions. Pt is scheduled for left TKR in December of this year. Patient will continue to benefit from skilled PT services to modify and progress therapeutic interventions, address functional mobility deficits, address ROM deficits, address strength deficits, analyze and address soft tissue restrictions, analyze and cue movement patterns, analyze and modify body mechanics/ergonomics, assess and modify postural abnormalities and instruct in home and community integration to attain remaining goals. [x]  See Plan of Care  []  See progress note/recertification  []  See Discharge Summary         Progress towards goals / Updated goals:  Short Term Goals: STG- To be accomplished in 5 treatments(s):  1. Pt will be compliant with HEP to encourage prophylaxis. Eval:dispensed, to be updated     Long Term Goals: LTG- To be accomplished in 10 treatment(s):  1. Pt will improved trunk rotation to 16 in to indicate mobility needed for gait. Eval:Right: 19.5 in  Left: 19 in with pain     2. Pt will be able to lift 30-40 pounds with good mechanics and without pain to indicate tolerance to work related activities. Eval:Pain with >20 pounds    3. Pt will be able to bridge to full height without pain or excessive lordosis to indicate functional glut and hamstring strength for daily activities. Eval:Bridging: decreased height and increased lumbar extension, no pain       4. Pt FOTO score will increase by 12 points to show improvement in function.   Eval:58  Current: will address at visit 5        PLAN  [x]  Upgrade activities as tolerated [x]  Continue plan of care  []  Update interventions per flow sheet       []  Discharge due to:_  []  Other:_      Tammi Rodrigues, PT, DPT 10/1/2021  12:49 PM    Future Appointments   Date Time Provider [place]   10/1/2021  1:30 PM Lowell Hatfield, PT, DPT [facility]

## 2021-10-01 NOTE — PROGRESS NOTES
[facility] at the [address], [place], [address] [facility]  Phone: [phone]      Fax:  [phone]             Plan of Care/ Statement of Necessity for Physical Therapy Services      Patient name: Lorrie Krueger Start of Care: 10/1/2021   Referral source: Unknown, Provider, MD : 1950    Medical Diagnosis: Low back pain, unspecified [M54.50]   Onset Date: ~4 weeks ago   Treatment Diagnosis: Mechanical LBP/Lumbo-pelvic dysfunction    Prior Hospitalization: see medical history Provider#: [id number]   Medications: Verified on Patient summary List    Comorbidities: HTN, Hx of LBP, Previous surgeries, BMI >30   Prior Level of Function: no pain with lifting >40 pounds, walking for exercise and stair negotiation       The Plan of Care and following information is based on the information from the initial evaluation. Assessment/ key information:   Pt is a very pleasant 66 y.o. male with C/C of Left sided LBP, gradual and insidious in onset ~4 weeks ago. Denies radicular sx at this time and no red flags reported. Signs/Symptoms consistent with mechanical LBP or lumbo-pelvic dysfunction. Did have a positive hip scour on the left for possible intraarticular hip pathology. Other objective findings include decreased ROM, glut and hamstring inhibition, decreased hamstring and glut strength, postural adaptations and soft tissue restrictions. Pt is scheduled for left TKR in December of this year.      Evaluation Complexity History HIGH Complexity :3+ comorbidities / personal factors will impact the outcome/ POC ; Examination HIGH Complexity : 4+ Standardized tests and measures addressing body structure, function, activity limitation and / or participation in recreation  ;Presentation MEDIUM Complexity : Evolving with changing characteristics  ; Clinical Decision Making MEDIUM Complexity : FOTO score of 26-74 FOTO score = an established functional score where 100 = no disability  Overall Complexity Rating: MEDIUM  Problem List: pain affecting function, decrease ROM, decrease strength, impaired gait/ balance, decrease ADL/ functional abilitiies, decrease activity tolerance and decrease flexibility/ joint mobility   Treatment Plan may include any combination of the following: Therapeutic exercise, Therapeutic activities, Neuromuscular re-education, Physical agent/modality, Gait/balance training, Manual therapy, Patient education and Self Care training  Vasopnuematic compression justification:  Per bilateral girth measures taken and listed above the edema is considered significant and having an impact on the patient's self care  Patient / Family readiness to learn indicated by: asking questions, trying to perform skills and interest  Persons(s) to be included in education: patient (P)  Barriers to Learning/Limitations: None  Patient Goal (s): I want get right and once I get the knee I want to start working out again  Patient Self Reported Health Status: good  Rehabilitation Potential: good    Short Term Goals: STG- To be accomplished in 5 treatments(s):  1. Pt will be compliant with HEP to encourage prophylaxis. Eval:dispensed, to be updated      Long Term Goals: LTG- To be accomplished in 10 treatment(s):  1. Pt will improved trunk rotation to 16 in to indicate mobility needed for gait. Eval:Right: 19.5 in       Left: 19 in with pain      2. Pt will be able to lift 30-40 pounds with good mechanics and without pain to indicate tolerance to work related activities. Eval:Pain with >20 pounds     3. Pt will be able to bridge to full height without pain or excessive lordosis to indicate functional glut and hamstring strength for daily activities. Eval:Bridging: decreased height and increased lumbar extension, no pain         4. Pt FOTO score will increase by 12 points to show improvement in function.   Eval:58  Current: will address at visit 5        Frequency / Duration: Patient to be seen 2 times per week for 4 weeks. Then decrease to 1x per week for 2 weeks    Patient/ Caregiver education and instruction: Diagnosis, prognosis, self care, activity modification and exercises   [x]  Plan of care has been reviewed with PTA    Certification Period: 10/1/21 - 12/14/21  Francheska Taveras PT, DPT 10/1/2021 4:32 PM  _____________________________________________________________________  I certify that the above Therapy Services are being furnished while the patient is under my care. I agree with the treatment plan and certify that this therapy is necessary.     Physician's Signature:____________Date:_________TIME:________                                      Unknown, Provider, MD    ** Signature, Date and Time must be completed for valid certification **    Please sign and return to [facility] at the [address], [facility], [address] [facility]       Phone: [phone]      Fax:  [phone]

## 2021-10-05 ENCOUNTER — HOSPITAL ENCOUNTER (OUTPATIENT)
Dept: PHYSICAL THERAPY | Age: 71
Discharge: HOME | End: 2021-10-05
Attending: UROLOGY
Payer: MEDICARE

## 2021-10-05 PROCEDURE — 97140 MANUAL THERAPY 1/> REGIONS: CPT

## 2021-10-05 PROCEDURE — 97112 NEUROMUSCULAR REEDUCATION: CPT

## 2021-10-05 PROCEDURE — 97110 THERAPEUTIC EXERCISES: CPT

## 2021-10-05 NOTE — PROGRESS NOTES
PT DAILY TREATMENT NOTE    Patient Name: Ritchie Mays  Date:10/5/2021  : 1950  [x]  Patient  Verified  Payor: Emely Roche / Plan: [place] & [place] / Product Type: Medicare /    In time:7:40  Out time:8:31  Total Treatment Time (min): 51  Total Timed Codes (min): 51  1:1 Treatment Time (MC/BCBS only): 51   Visit #: 2 of 10    Treatment Dx: Low back pain, unspecified [M54.50]    SUBJECTIVE  Pain Level (0-10 scale): 3  Any medication changes, allergies to medications, adverse drug reactions, diagnosis change, or new procedure performed?: [x] No    [] Yes (see summary sheet for update)  Subjective functional status/changes:   [] No changes reported  \"Its a deep aching in the left side of my low back. \"     OBJECTIVE            20 min Therapeutic Exercise:  [] See flow sheet :   Rationale: increase ROM, increase strength, improve coordination, improve balance and increase proprioception to improve the patients ability to perform pain free ADL's          20 min Neuromuscular Re-education:  []  See flow sheet :   Rationale: increase ROM, increase strength, improve coordination, improve balance and increase proprioception  to improve the patients ability to perform pain free ADL's     11 min Manual Therapy:  STM to left glut and piriformis in right side lying   Left posterior hip capsule stretching    Rationale: decrease pain, increase ROM, increase tissue extensibility, decrease edema , correct positional vertigo, decrease trigger points and increase postural awareness to improve the patients ability to perform pain free ADL's   The manual therapy interventions were performed at a separate and distinct time from the therapeutic activities interventions.         With   [] TE   [] TA   [] neuro   [] other: Patient Education: [x] Review HEP    [] Progressed/Changed HEP based on:   [] positioning   [] body mechanics   [] transfers   [] heat/ice application    [] other:      Other Objective/Functional Measures:   Tension in left posterior hip     LTR Right 20 Left20     Pain Level (0-10 scale) post treatment: 1    ASSESSMENT/Changes in Function:   Pt presented in therapy with reports of aching in left SI/ low back. Pt was challenged with left hip shifting and left posterior hip capsule tension. Pt responded well to left posterior hip stretching however was very challenged with PPT and hip shifting. Patient will continue to benefit from skilled PT services to modify and progress therapeutic interventions, address functional mobility deficits, address ROM deficits, address strength deficits, analyze and address soft tissue restrictions, analyze and cue movement patterns, analyze and modify body mechanics/ergonomics, assess and modify postural abnormalities, address imbalance/dizziness and instruct in home and community integration to attain remaining goals. [x]  See Plan of Care  []  See progress note/recertification  []  See Discharge Summary         Progress towards goals / Updated goals:    Short Term Goals: STG- To be accomplished in 5 treatments(s):  1.  Pt will be compliant with HEP to encourage prophylaxis. Eval:dispensed, to be updated   Current: Reviewed and updated with left posterior hip capsule stretching: progressing 10/5/21     Long Term Goals: LTG- To be accomplished in 10 treatment(s):  1.  Pt will improved trunk rotation to 16 in to indicate mobility needed for gait. Eval:Right: 19.5 in       Left: 19 in with pain   Current: LTR right 20 Left 20 :progressing 10/5/21     2.  Pt will be able to lift 30-40 pounds with good mechanics and without pain to indicate tolerance to work related activities. Eval:Pain with >20 pounds     3.  Pt will be able to bridge to full height without pain or excessive lordosis to indicate functional glut and hamstring strength for daily activities.   Eval:Bridging: decreased height and increased lumbar extension, no pain         4.  Pt FOTO score will increase by 12 points to show improvement in function.   Eval:58  Current: will address at visit 5        PLAN  []  Upgrade activities as tolerated     [x]  Continue plan of care  []  Update interventions per flow sheet       []  Discharge due to:_  []  Other:_      Corona Almazan PTA 10/5/2021  7:49 AM    Future Appointments   Date Time Provider [place]   10/6/2021  7:45 AM Howard Acosta PT, DPT [facility]   10/12/2021  7:45 AM Pauleen Nails, PTA [facility]   10/13/2021  7:45 AM Lauryn William [facility]   10/19/2021  5:15 PM Howard Acosta PT, DPT [facility]   10/21/2021 12:15 PM Pauleen Nails, PTA [facility]   10/26/2021  7:45 AM Pauleen Nails, PTA [facility]   10/29/2021  7:45 AM Lauryn William [facility]

## 2021-10-06 ENCOUNTER — HOSPITAL ENCOUNTER (OUTPATIENT)
Dept: PHYSICAL THERAPY | Age: 71
Discharge: HOME | End: 2021-10-06
Attending: UROLOGY
Payer: MEDICARE

## 2021-10-06 PROCEDURE — 97110 THERAPEUTIC EXERCISES: CPT

## 2021-10-06 PROCEDURE — 97140 MANUAL THERAPY 1/> REGIONS: CPT

## 2021-10-06 PROCEDURE — 97112 NEUROMUSCULAR REEDUCATION: CPT

## 2021-10-06 NOTE — PROGRESS NOTES
PT DAILY TREATMENT NOTE    Patient Name: Ezra Mccray  Date:10/6/2021  : 1950  [x]  Patient  Verified  Payor: Rock Messina / Plan: [place] MEDICARE PART A & B / Product Type: Medicare /    In time:7:42 am  Out time:8:35 am   Total Treatment Time (min): 53  Total Timed Codes (min): 53  1:1 Treatment Time (MC/BCBS only): 53   Visit #: 3 of 10    Treatment Dx: Low back pain, unspecified [M54.50]    SUBJECTIVE  Pain Level (0-10 scale): 3  Any medication changes, allergies to medications, adverse drug reactions, diagnosis change, or new procedure performed?: [x] No    [] Yes (see summary sheet for update)  Subjective functional status/changes:   [] No changes reported  \"I can tell this is helping. Still a nagging pain, The hip (left) feels less stiff. \"    OBJECTIVE    10 min Therapeutic Exercise:  [x] See flow sheet :   Rationale: increase ROM, increase strength, improve coordination and increase proprioception to improve the patients ability to perform daily activities with decreased pain and symptom levels       33 min Neuromuscular Re-education:  [x]  See flow sheet :required max cues and props for alternating reach and 90-90's   Rationale: increase ROM, increase strength, improve coordination and increase proprioception  to improve the patients ability to perform daily activities with decreased pain and symptom levels      10- min Manual Therapy:  STM to left posterior hip capsule in right S/L  Manual stretch to left posterior hip in right S/L    Rationale: decrease pain, increase ROM, increase tissue extensibility, decrease trigger points and increase postural awareness to improve the patients ability to perform daily activities with decreased pain and symptom levels    The manual therapy interventions were performed at a separate and distinct time from the therapeutic activities interventions.           With   [] TE   [] TA   [] neuro   [] other: Patient Education: [x] Review HEP    [] Progressed/Changed HEP based on:   [] positioning   [] body mechanics   [] transfers   [] heat/ice application    [] other:      Other Objective/Functional Measures:   Trunk Rot 18 in bilaterally      Pain Level (0-10 scale) post treatment: 0    ASSESSMENT/Changes in Function:   Required increased tactile cues with wall IO/TA, alternating reach and 90-90's. Responded very well to adductor pull back. Plan to add right glut max next session to address glut inhibition. Patient will continue to benefit from skilled PT services to modify and progress therapeutic interventions, address functional mobility deficits, address ROM deficits, address strength deficits, analyze and address soft tissue restrictions, analyze and cue movement patterns, analyze and modify body mechanics/ergonomics, assess and modify postural abnormalities and instruct in home and community integration to attain remaining goals. [x]  See Plan of Care  []  See progress note/recertification  []  See Discharge Summary         Progress towards goals / Updated goals:  Short Term Goals: STG- To be accomplished in 5 treatments(s):  1.  Pt will be compliant with HEP to encourage prophylaxis. Eval:dispensed, to be updated   Current: Reviewed and updated with left posterior hip capsule stretching: progressing 10/5/21     Long Term Goals: LTG- To be accomplished in 10 treatment(s):  1.  Pt will improved trunk rotation to 16 in to indicate mobility needed for gait. Eval:Right: 19.5 in       Left: 19 in with pain   Current: Progressing 10/6/21 Trunk Rot 18 in bilaterally      2.  Pt will be able to lift 30-40 pounds with good mechanics and without pain to indicate tolerance to work related activities. Eval:Pain with >20 pounds     3.  Pt will be able to bridge to full height without pain or excessive lordosis to indicate functional glut and hamstring strength for daily activities.   Eval:Bridging: decreased height and increased lumbar extension, no pain         4.  Pt FOTO score will increase by 12 points to show improvement in function.   Eval:58  Current: will address at visit 5    PLAN  []  Upgrade activities as tolerated     [x]  Continue plan of care  []  Update interventions per flow sheet       []  Discharge due to:_  []  Other:_      Gaby May, PT, DPT 10/6/2021  7:51 AM    Future Appointments   Date Time Provider [place]   10/12/2021  7:45 AM Saurav Estrdaa PTA [facility]   10/13/2021  7:45 AM Helga William [facility]   10/19/2021  5:15 PM India Rogers PT, DPT [facility]   10/21/2021 12:15 PM Saurav Estrada PTA [facility]   10/26/2021  7:45 AM Saurav Estrada PTA [facility]   10/29/2021  7:45 AM Helga William [facility]

## 2021-10-12 ENCOUNTER — HOSPITAL ENCOUNTER (OUTPATIENT)
Dept: PHYSICAL THERAPY | Age: 71
Discharge: HOME | End: 2021-10-12
Attending: UROLOGY
Payer: MEDICARE

## 2021-10-12 PROCEDURE — 97140 MANUAL THERAPY 1/> REGIONS: CPT

## 2021-10-12 PROCEDURE — 97112 NEUROMUSCULAR REEDUCATION: CPT

## 2021-10-12 PROCEDURE — 97110 THERAPEUTIC EXERCISES: CPT

## 2021-10-12 NOTE — PROGRESS NOTES
PT DAILY TREATMENT NOTE    Patient Name: Tabatha Meng  Date:10/12/2021  : 1950  [x]  Patient  Verified  Payor: Yuri Adame / Plan: [place] MEDICARE PART A & B / Product Type: Medicare /    In time:7:42  Out time:8:28  Total Treatment Time (min): 46  Total Timed Codes (min): 46  1:1 Treatment Time (MC/BCBS only): 46   Visit #: 4 of 10    Treatment Dx: Low back pain, unspecified [M54.50]    SUBJECTIVE  Pain Level (0-10 scale): 0  Any medication changes, allergies to medications, adverse drug reactions, diagnosis change, or new procedure performed?: [x] No    [] Yes (see summary sheet for update)  Subjective functional status/changes:   [] No changes reported  \"I feel pretty good today. No pain. I drove [facility] this weekend. \"     OBJECTIVE        20 min Therapeutic Exercise:  [] See flow sheet :   Rationale: increase ROM, increase strength, improve coordination, improve balance and increase proprioception to improve the patients ability to perform pain free ADL's       16 min Neuromuscular Re-education:  []  See flow sheet :   Rationale: increase ROM, increase strength, improve coordination, improve balance and increase proprioception  to improve the patients ability to perform pain free ADL's     10 min Manual Therapy:  STM to left posterior hip capsule in right S/L  Manual stretch to left posterior hip in right S/L    The manual therapy interventions were performed at a separate and distinct time from the therapeutic activities interventions.   Rationale: decrease pain, increase ROM, increase tissue extensibility, decrease edema , correct positional vertigo, decrease trigger points and increase postural awareness to perform pain free ADL's       With   [x] TE   [] TA   [] neuro   [] other: Patient Education: [x] Review HEP    [] Progressed/Changed HEP based on:   [] positioning   [] body mechanics   [] transfers   [] heat/ice application    [] other:      Other Objective/Functional Measures:   LTR  Right 18 Left 18     Pain Level (0-10 scale) post treatment: 0    ASSESSMENT/Changes in Function:   Pt presented in therapy with no c/o pain. Stated he was able to drive to [facility] and back with no pain. Pt tolerated exercise very well with decreased discomfort with Left posterior hip capsule stretching compared to previous sessions. Patient will continue to benefit from skilled PT services to modify and progress therapeutic interventions, address functional mobility deficits, address ROM deficits, address strength deficits, analyze and address soft tissue restrictions, analyze and cue movement patterns, analyze and modify body mechanics/ergonomics, assess and modify postural abnormalities, address imbalance/dizziness and instruct in home and community integration to attain remaining goals. [x]  See Plan of Care  []  See progress note/recertification  []  See Discharge Summary         Progress towards goals / Updated goals:  Short Term Goals: STG- To be accomplished in 5 treatments(s):  1.  Pt will be compliant with HEP to encourage prophylaxis. Eval:dispensed, to be updated   Current: Reviewed and updated with left posterior hip capsule stretching: progressing 10/5/21     Long Term Goals: LTG- To be accomplished in 10 treatment(s):  1.  Pt will improved trunk rotation to 16 in to indicate mobility needed for gait. Eval:Right: 19.5 in       Left: 19 in with pain   Current: Progressing 10/12/21 Trunk Rot 18 in bilaterally      2.  Pt will be able to lift 30-40 pounds with good mechanics and without pain to indicate tolerance to work related activities. Eval:Pain with >20 pounds     3.  Pt will be able to bridge to full height without pain or excessive lordosis to indicate functional glut and hamstring strength for daily activities.   Eval:Bridging: decreased height and increased lumbar extension, no pain   Current: Medium height bridges :progressing 10/12/21        4.  Pt FOTO score will increase by 12 points to show improvement in function.   Eval:58  Current: will address at visit 5       PLAN  []  Upgrade activities as tolerated     [x]  Continue plan of care  []  Update interventions per flow sheet       []  Discharge due to:_  []  Other:_      Corona Almazan PTA 10/12/2021  7:50 AM    Future Appointments   Date Time Provider [place]   10/13/2021  7:45 AM Lauryn William [facility]   10/19/2021  5:15 PM Howard Acosta, PT, DPT [facility]   10/21/2021 12:15 PM Jessica Jackson PTA [facility]   10/26/2021  7:45 AM Jessica Jackson, KAILYN [facility]   10/29/2021  7:45 AM Lauryn WilliamHPHOLLI [facility]

## 2021-10-13 ENCOUNTER — HOSPITAL ENCOUNTER (OUTPATIENT)
Dept: PHYSICAL THERAPY | Age: 71
Discharge: HOME | End: 2021-10-13
Attending: UROLOGY
Payer: MEDICARE

## 2021-10-13 PROCEDURE — 97110 THERAPEUTIC EXERCISES: CPT

## 2021-10-13 PROCEDURE — 97112 NEUROMUSCULAR REEDUCATION: CPT

## 2021-10-13 NOTE — PROGRESS NOTES
PT DAILY TREATMENT NOTE    Patient Name: Estuardo Wiseman  Date:10/13/2021  : 1950  [x]  Patient  Verified  Payor: Festus Racer / Plan: [place] MEDICARE PART A & B / Product Type: Medicare /    In time:7:53  Out time:8:45  Total Treatment Time (min): 52  Total Timed Codes (min): 52  1:1 Treatment Time (MC/BCBS only): 52   Visit #: 5 of 10    Treatment Dx: Low back pain, unspecified [M54.50]    SUBJECTIVE  Pain Level (0-10 scale): 0  Any medication changes, allergies to medications, adverse drug reactions, diagnosis change, or new procedure performed?: [x] No    [] Yes (see summary sheet for update)  Subjective functional status/changes:   [] No changes reported  \"No pain but I can still feel it there. \"    OBJECTIVE    37 min Therapeutic Exercise:  [x] See flow sheet :   Rationale: increase ROM and increase strength to improve the patients ability to perform daily activities with decreased pain and symptom levels     15 min Neuromuscular Re-education:  [x]  See flow sheet :   Rationale: increase strength, improve coordination and increase proprioception  to improve the patients ability to perform daily activities with decreased pain and symptom levels      With   [] TE   [] TA   [] neuro   [] other: Patient Education: [x] Review HEP    [] Progressed/Changed HEP based on:   [] positioning   [] body mechanics   [] transfers   [] heat/ice application    [] other:      Other Objective/Functional Measures:   FOTO 61   Posterior lean with march with reach     Pain Level (0-10 scale) post treatment: 0    ASSESSMENT/Changes in Function: Pt tolerated session well with no adverse reactions. Pt reporting overall pain is improving with feeling \"looser\" however no change in left knee pain. Pt easily fatigued with hip IR and right glut max today. Able to maintain PPT with bench planks however cues to decrease leg rotation. Improved balance with Turf march with reach with cues to lean forward.      Patient will continue to benefit from skilled PT services to modify and progress therapeutic interventions, address functional mobility deficits, address ROM deficits, address strength deficits, analyze and address soft tissue restrictions, analyze and cue movement patterns, analyze and modify body mechanics/ergonomics, assess and modify postural abnormalities and instruct in home and community integration to attain remaining goals. [x]  See Plan of Care  []  See progress note/recertification  []  See Discharge Summary         Progress towards goals / Updated goals:  Short Term Goals: STG- To be accomplished in 5 treatments(s):  1.  Pt will be compliant with HEP to encourage prophylaxis. Eval:dispensed, to be updated   Current: Reviewed and updated with left posterior hip capsule stretching: progressing 10/5/21     Long Term Goals: LTG- To be accomplished in 10 treatment(s):  1.  Pt will improved trunk rotation to 16 in to indicate mobility needed for gait. Eval:Right: 19.5 in       Left: 19 in with pain   Current: Progressing 10/12/21 Trunk Rot 18 in bilaterally      2.  Pt will be able to lift 30-40 pounds with good mechanics and without pain to indicate tolerance to work related activities. Eval:Pain with >20 pounds     3.  Pt will be able to bridge to full height without pain or excessive lordosis to indicate functional glut and hamstring strength for daily activities. Eval:Bridging: decreased height and increased lumbar extension, no pain   Current: Medium height bridges :progressing 10/12/21     4.  Pt FOTO score will increase by 12 points to show improvement in function.   Eval:58  Current: 61 progressing 10/13/21       PLAN  []  Upgrade activities as tolerated     [x]  Continue plan of care  []  Update interventions per flow sheet       []  Discharge due to:_  []  Other:_      Kvng Wheeler 10/13/2021  7:58 AM    Future Appointments   Date Time Provider [place]   10/19/2021  5:15 PM Elicia Capellan, PT, DPT [facility]   10/21/2021 12:15 PM Silvio Lorenzo [facility]   10/26/2021  7:45 AM Lily Condon PTA [facility]   10/29/2021  7:45 AM Slime William [facility]

## 2021-10-19 ENCOUNTER — HOSPITAL ENCOUNTER (OUTPATIENT)
Dept: PHYSICAL THERAPY | Age: 71
Discharge: HOME | End: 2021-10-19
Attending: UROLOGY
Payer: MEDICARE

## 2021-10-19 PROCEDURE — 97110 THERAPEUTIC EXERCISES: CPT

## 2021-10-19 PROCEDURE — 97140 MANUAL THERAPY 1/> REGIONS: CPT

## 2021-10-19 PROCEDURE — 97112 NEUROMUSCULAR REEDUCATION: CPT

## 2021-10-19 PROCEDURE — 97530 THERAPEUTIC ACTIVITIES: CPT

## 2021-10-19 NOTE — PROGRESS NOTES
PT DAILY TREATMENT NOTE    Patient Name: Terell Winter  Date:10/19/2021  : 1950  [x]  Patient  Verified  Payor: Sandrita Benignos / Plan: [place] & [place] / Product Type: Medicare /    In time:5:03 pm  Out time:5:59 pm   Total Treatment Time (min): 56 (assisted by Yola Gipson DPT)  Total Timed Codes (min): 56  1:1 Treatment Time (MC/BCBS only): 56   Visit #: 6 of 10    Treatment Dx: Low back pain, unspecified [M54.50]    SUBJECTIVE  Pain Level (0-10 scale): 1  Any medication changes, allergies to medications, adverse drug reactions, diagnosis change, or new procedure performed?: [x] No    [] Yes (see summary sheet for update)  Subjective functional status/changes:   [] No changes reported  \"not really pain but I can tell it is there. \"    OBJECTIVE      10 min Therapeutic Exercise:  [x] See flow sheet :   Rationale: increase ROM, increase strength, improve coordination and increase proprioception to improve the patients ability to perform daily activities with decreased pain and symptom levels      12 min Therapeutic Activity:  [x]  See flow sheet :   Rationale: increase ROM, increase strength, improve coordination, improve balance and increase proprioception  to improve the patients ability to perform daily activities with decreased pain and symptom levels       26 min Neuromuscular Re-education:  [x]  See flow sheet :   Rationale: increase ROM, increase strength, improve coordination, improve balance and increase proprioception  to improve the patients ability to perform daily activities with decreased pain and symptom levels      8 min Manual Therapy:  Manual stretching of left posterior hip capsule in right S/L   Infraclavicular rib mobilization and left rib mobs with inhale/exhale    Rationale: decrease pain, increase ROM, increase tissue extensibility, decrease trigger points and increase postural awareness to improve the patients ability to perform daily activities with decreased pain and symptom levels    The manual therapy interventions were performed at a separate and distinct time from the therapeutic activities interventions. With   [] TE   [] TA   [] neuro   [] other: Patient Education: [x] Review HEP    [] Progressed/Changed HEP based on:   [] positioning   [] body mechanics   [] transfers   [] heat/ice application    [] other:      Other Objective/Functional Measures:   Trunk Rot (pre/post):  Right: 18.5 in/17 in  Left: 19 in stiff in back/17 in     Pain Level (0-10 scale) post treatment: 0    ASSESSMENT/Changes in Function:   Appeared to respond very well to rib mobilizations. Reported feeling left sided stiffness with triceps bridges. Reported feeling mild pain over weekend after mowing lawn. Repands well to rib mobilization as well we inhibition of left posterior hip capsule. Patient will continue to benefit from skilled PT services to modify and progress therapeutic interventions, address functional mobility deficits, address ROM deficits, address strength deficits, analyze and address soft tissue restrictions, analyze and cue movement patterns, analyze and modify body mechanics/ergonomics, assess and modify postural abnormalities and instruct in home and community integration to attain remaining goals. [x]  See Plan of Care  []  See progress note/recertification  []  See Discharge Summary         Progress towards goals / Updated goals:  Short Term Goals: STG- To be accomplished in 5 treatments(s):  1.  Pt will be compliant with HEP to encourage prophylaxis. Eval:dispensed, to be updated   Current: Reviewed and updated with left posterior hip capsule stretching: progressing 10/5/21     Long Term Goals: LTG- To be accomplished in 10 treatment(s):  1.  Pt will improved trunk rotation to 16 in to indicate mobility needed for gait.   Eval:Right: 19.5 in       Left: 19 in with pain   Current: Progressing 10/19/21 Trunk Rot 17 in bilaterally      2.  Pt will be able to lift 30-40 pounds with good mechanics and without pain to indicate tolerance to work related activities. Eval:Pain with >20 pounds     3.  Pt will be able to bridge to full height without pain or excessive lordosis to indicate functional glut and hamstring strength for daily activities. Eval:Bridging: decreased height and increased lumbar extension, no pain   Current: Medium height bridges :progressing 10/12/21     4.  Pt FOTO score will increase by 12 points to show improvement in function.   Eval:58  Current: 61 progressing 10/13/21       PLAN  []  Upgrade activities as tolerated     [x]  Continue plan of care  []  Update interventions per flow sheet       []  Discharge due to:_  []  Other:_      Darin Saleh PT, DPT 10/19/2021  2:34 PM    Future Appointments   Date Time Provider [place]   10/19/2021  5:15 PM Milagros Bustillo PT, DPT [facility]   10/21/2021 12:15 PM Bishop Lopez PTA [facility]   10/26/2021  7:45 AM Bishop Lopez PTA [facility]   10/29/2021  7:45 AM Darline William [facility]

## 2021-10-21 ENCOUNTER — APPOINTMENT (OUTPATIENT)
Dept: PHYSICAL THERAPY | Age: 71
End: 2021-10-21
Attending: UROLOGY
Payer: MEDICARE

## 2021-10-26 ENCOUNTER — HOSPITAL ENCOUNTER (OUTPATIENT)
Dept: PHYSICAL THERAPY | Age: 71
Discharge: HOME | End: 2021-10-26
Attending: UROLOGY
Payer: MEDICARE

## 2021-10-26 PROCEDURE — 97140 MANUAL THERAPY 1/> REGIONS: CPT

## 2021-10-26 PROCEDURE — 97112 NEUROMUSCULAR REEDUCATION: CPT

## 2021-10-26 PROCEDURE — 97110 THERAPEUTIC EXERCISES: CPT

## 2021-10-26 NOTE — PROGRESS NOTES
PT DAILY TREATMENT NOTE    Patient Name: Estuardo Born  Date:10/26/2021  : 1950  [x]  Patient  Verified  Payor: Festus Racer / Plan: [place] MEDICARE PART A & B / Product Type: Medicare /    In time:7:45  Out time:8:27  Total Treatment Time (min): 42  Total Timed Codes (min): 42  1:1 Treatment Time (MC/BCBS only): 42   Visit #: 7 of 10    Treatment Dx: Low back pain, unspecified [M54.50]    SUBJECTIVE  Pain Level (0-10 scale): 0  Any medication changes, allergies to medications, adverse drug reactions, diagnosis change, or new procedure performed?: [x] No    [] Yes (see summary sheet for update)  Subjective functional status/changes:   [] No changes reported  \"Just a little stiff. I can feel it. \"     OBJECTIVE      22 min Therapeutic Exercise:  [] See flow sheet :   Rationale: increase ROM, increase strength, improve coordination and improve balance to improve the patients ability to perform pain free ADL's          12 min Neuromuscular Re-education:  []  See flow sheet :   Rationale: increase ROM, increase strength, improve coordination, improve balance and increase proprioception  to improve the patients ability to perform pain free ADL's     8 min Manual Therapy:    Manual stretching of left posterior hip capsule in right S/L   Infraclavicular rib mobilization and left rib mobs with inhale/exhale    Rationale: decrease pain, increase ROM, increase tissue extensibility, decrease edema , correct positional vertigo and decrease trigger points to improve the patients ability to perform pain free ADL's   The manual therapy interventions were performed at a separate and distinct time from the therapeutic activities interventions.            With   [] TE   [x] TA   [] neuro   [] other: Patient Education: [x] Review HEP    [] Progressed/Changed HEP based on:   [] positioning   [] body mechanics   [] transfers   [] heat/ice application    [] other:      Other Objective/Functional Measures:   Low bridge  LTR  Right 14 Left 15     Pain Level (0-10 scale) post treatment: 0    ASSESSMENT/Changes in Function:   Pt presented in therapy with reports of stiffness , left > right. No significant pain. Pt performed all exercises well and was challenged to fatigue with addition of left glut med facilitation technique. Continued tension in left posterior hip limiting left trunk rotation. Patient will continue to benefit from skilled PT services to modify and progress therapeutic interventions, address functional mobility deficits, address ROM deficits, address strength deficits, analyze and address soft tissue restrictions, analyze and cue movement patterns, analyze and modify body mechanics/ergonomics, assess and modify postural abnormalities, address imbalance/dizziness and instruct in home and community integration to attain remaining goals. [x]  See Plan of Care  []  See progress note/recertification  []  See Discharge Summary         Progress towards goals / Updated goals:    Short Term Goals: STG- To be accomplished in 5 treatments(s):  1.  Pt will be compliant with HEP to encourage prophylaxis. Eval:dispensed, to be updated   Current: Reviewed and updated with left posterior hip capsule stretching. Pt reported compliance : progressing 10/26/21     Long Term Goals: LTG- To be accomplished in 10 treatment(s):  1.  Pt will improved trunk rotation to 16 in to indicate mobility needed for gait. Eval:Right: 19.5 in       Left: 19 in with pain   Current: LTR  Right 14 Left 15: Progressing 10/26/21     2.  Pt will be able to lift 30-40 pounds with good mechanics and without pain to indicate tolerance to work related activities. Eval:Pain with >20 pounds     3.  Pt will be able to bridge to full height without pain or excessive lordosis to indicate functional glut and hamstring strength for daily activities.   Eval:Bridging: decreased height and increased lumbar extension, no pain   Current: low height tricep bridges, no pain :progressing 10/26/21     4.  Pt FOTO score will increase by 12 points to show improvement in function.   Eval:58  Current: 61 progressing 10/13/21     PLAN  []  Upgrade activities as tolerated     [x]  Continue plan of care  []  Update interventions per flow sheet       []  Discharge due to:_  []  Other:_      Larina Hammans, KAILYN 10/26/2021  7:43 AM    Future Appointments   Date Time Provider [place]   10/26/2021  7:45 AM Chase SHAHHOLLI [facility]   10/29/2021  7:45 AM Alton WilliamHOLLI [facility]

## 2021-10-29 ENCOUNTER — HOSPITAL ENCOUNTER (OUTPATIENT)
Dept: PHYSICAL THERAPY | Age: 71
Discharge: HOME | End: 2021-10-29
Attending: UROLOGY
Payer: MEDICARE

## 2021-10-29 PROCEDURE — 97110 THERAPEUTIC EXERCISES: CPT

## 2021-10-29 PROCEDURE — 97112 NEUROMUSCULAR REEDUCATION: CPT

## 2021-10-29 PROCEDURE — 97140 MANUAL THERAPY 1/> REGIONS: CPT

## 2021-10-29 NOTE — PROGRESS NOTES
[facility] at the [address], [place], [address] [facility]  Phone: [phone]      Fax:  [phone]    Progress Note  Patient name: Noa Saxena Start of Care: 10/1/21   Referral source: Tomas Clemens MD : 1950   Medical/Treatment Diagnosis: Low back pain, unspecified [M54.50] Onset Date: ~4 weeks ago     Prior Hospitalization: see medical history Provider#: [id number]   Medications: Verified on Patient Summary List    Comorbidities: HTN, Hx of LBP, Previous surgeries, BMI >30   Prior Level of Function: no pain with lifting >40 pounds, walking for exercise and stair negotiation     Visits from Start of Care: 8    Missed Visits: 0    Progress Towards Goals:   Short Term Goals: STG- To be accomplished in 5 treatments(s):  1.  Pt will be compliant with HEP to encourage prophylaxis. Eval:dispensed, to be updated   Current: compliance per pt report goal MET 10/29/21     Long Term Goals: LTG- To be accomplished in 10 treatment(s):  1.  Pt will improved trunk rotation to 16 in to indicate mobility needed for gait. Eval:Right: 19.5 in       Left: 19 in with pain   Current: 18in left, 18.5in right no pain progressing 10/29/21     2.  Pt will be able to lift 30-40 pounds with good mechanics and without pain to indicate tolerance to work related activities. Eval:Pain with >20 pounds  Current: no pain with lifting 30# however decreased WB on left LE, improved with cues progressing 10/29/21     3.  Pt will be able to bridge to full height without pain or excessive lordosis to indicate functional glut and hamstring strength for daily activities. Eval:Bridging: decreased height and increased lumbar extension, no pain   Current: increased lumbar extension at end range for bridge needing cues to correct progressing well 10/29/21     4.  Pt FOTO score will increase by 12 points to show improvement in function.   Eval:58  Current: 61 progressing 10/13/21    Key Functional Changes: Pt presented to therapy C/C of Left sided LBP, gradual and insidious in onset. Pt has attended 8 sessions including initial eval with making good progress towards goals with reporting overall pain decreasing however can still tell that is there at times. Pt now able to touch toes with lumbar flexion without pain however continued trunk rotation limitation possibly due to continued tension in left posterior hip. Improving height with bridges however lumbar extension still noted at end range. Pt able to  30# DB with less tightness with increasing WB through left LE. Pt would benefit from continued skilled PT services to address remaining unmet goals and allow pt to complete ADLs without pain or tightness.       Updated Goals: to be achieved in 9 treatments:   See goals above    ASSESSMENT/RECOMMENDATIONS:  [x]Continue therapy per initial plan/protocol at a frequency of  1 x per week for 9 weeks  []Continue therapy with the following recommended changes:_____________________      _____________________________________________________________________  []Discontinue therapy progressing towards or have reached established goals  []Discontinue therapy due to lack of appreciable progress towards goals  []Discontinue therapy due to lack of attendance or compliance  []Await Physician's recommendations/decisions regarding therapy  []Other:________________________________________________________________    Thank you for this referral.   Lupis Melendez Remesic 10/29/2021 9:36 AM

## 2021-10-29 NOTE — PROGRESS NOTES
PT DAILY TREATMENT NOTE    Patient Name: Bart Shannon  Date:10/29/2021  : 1950  [x]  Patient  Verified  Payor: Crescencio Bernal / Plan: [place] MEDICARE PART A & B / Product Type: Medicare /    In time:7:45  Out time:8:37  Total Treatment Time (min): 52  Total Timed Codes (min): 52  1:1 Treatment Time (MC/BCBS only): 52   Visit #: 8 of 10    Treatment Dx: Low back pain, unspecified [M54.50]    SUBJECTIVE  Pain Level (0-10 scale): 0  Any medication changes, allergies to medications, adverse drug reactions, diagnosis change, or new procedure performed?: [x] No    [] Yes (see summary sheet for update)  Subjective functional status/changes:   [] No changes reported  \"No pain this morning. I can tell that it is there though but it doesn't hurt. \"    OBJECTIVE       22 min Therapeutic Exercise:  [x] See flow sheet :   Rationale: increase ROM and increase strength to improve the patients ability to perform daily activities with decreased pain and symptom levels     19 min Neuromuscular Re-education:  [x]  See flow sheet :   Rationale: increase strength, improve coordination and increase proprioception  to improve the patients ability to perform daily activities with decreased pain and symptom levels    10 min Manual Therapy:  Manual stretching of left posterior hip capsule in right s/l, 2 person rib mobilization with inhale/exale, rib mobilization with left pec LTR   Rationale: decrease pain, increase ROM and increase tissue extensibility to improve the patients ability to perform daily activities with decreased pain and symptom levels  The manual therapy interventions were performed at a separate and distinct time from the therapeutic activities interventions.     With   [] TE   [] TA   [] neuro   [] other: Patient Education: [x] Review HEP    [] Progressed/Changed HEP based on:   [] positioning   [] body mechanics   [] transfers   [] heat/ice application    [] other:      Other Objective/Functional Measures:   See goals below  Decreased left hip pain with lifting 30# DB with cues to feel left heel     Pain Level (0-10 scale) post treatment: 0    ASSESSMENT/Changes in Function: Pt presented to therapy C/C of Left sided LBP, gradual and insidious in onset. Pt has attended 8 sessions including initial eval with making good progress towards goals with reporting overall pain decreasing however can still tell that is there at times. Pt now able to touch toes with lumbar flexion without pain however continued trunk rotation limitation possibly due to continued tension in left posterior hip. Improving height with bridges however lumbar extension still noted at end range. Pt able to  30# DB with less tightness with increasing WB through left LE. Pt would benefit from continued skilled PT services to address remaining unmet goals and allow pt to complete ADLs without pain or tightness. Patient will continue to benefit from skilled PT services to modify and progress therapeutic interventions, address functional mobility deficits, address ROM deficits, address strength deficits, analyze and address soft tissue restrictions, analyze and cue movement patterns, analyze and modify body mechanics/ergonomics, assess and modify postural abnormalities and instruct in home and community integration to attain remaining goals. [x]  See Plan of Care  []  See progress note/recertification  []  See Discharge Summary         Progress towards goals / Updated goals:  Short Term Goals: STG- To be accomplished in 5 treatments(s):  1.  Pt will be compliant with HEP to encourage prophylaxis. Eval:dispensed, to be updated   Current: compliance per pt report goal MET 10/29/21     Long Term Goals: LTG- To be accomplished in 10 treatment(s):  1.  Pt will improved trunk rotation to 16 in to indicate mobility needed for gait.   Eval:Right: 19.5 in       Left: 19 in with pain   Current: 18in left, 18.5in right no pain progressing 10/29/21     2.  Pt will be able to lift 30-40 pounds with good mechanics and without pain to indicate tolerance to work related activities. Eval:Pain with >20 pounds  Current: no pain with lifting 30# however decreased WB on left LE, improved with cues progressing 10/29/21     3.  Pt will be able to bridge to full height without pain or excessive lordosis to indicate functional glut and hamstring strength for daily activities. Eval:Bridging: decreased height and increased lumbar extension, no pain   Current: increased lumbar extension at end range for bridge needing cues to correct progressing well 10/29/21     4.  Pt FOTO score will increase by 12 points to show improvement in function.   Eval:58  Current: 61 progressing 10/13/21    PLAN  []  Upgrade activities as tolerated     [x]  Continue plan of care  []  Update interventions per flow sheet       []  Discharge due to:_  []  Other:_      Gopal Cintron 10/29/2021  7:49 AM    Future Appointments   Date Time Provider [place]   11/5/2021  1:30 PM Loida Romero, KAILYN [facility]

## 2021-11-05 ENCOUNTER — HOSPITAL ENCOUNTER (OUTPATIENT)
Dept: PHYSICAL THERAPY | Age: 71
Discharge: HOME | End: 2021-11-05
Attending: UROLOGY
Payer: MEDICARE

## 2021-11-05 PROCEDURE — 97112 NEUROMUSCULAR REEDUCATION: CPT

## 2021-11-05 PROCEDURE — 97140 MANUAL THERAPY 1/> REGIONS: CPT

## 2021-11-05 PROCEDURE — 97110 THERAPEUTIC EXERCISES: CPT

## 2021-11-05 NOTE — PROGRESS NOTES
PT DAILY TREATMENT NOTE    Patient Name: Bogdan Brown  Date:2021  : 1950  [x]  Patient  Verified  Payor: [facility] [employer] / Plan: [place] MEDICARE PART A & B / Product Type: Medicare /    In time:1:25  Out time:2:11  Total Treatment Time (min): 46  Total Timed Codes (min): 46  1:1 Treatment Time (MC/BCBS only): 46   Visit #: 9 of 17    Treatment Dx: Other low back pain [M54.59]    SUBJECTIVE  Pain Level (0-10 scale): 0  Any medication changes, allergies to medications, adverse drug reactions, diagnosis change, or new procedure performed?: [x] No    [] Yes (see summary sheet for update)  Subjective functional status/changes:   [] No changes reported  \"No , you all are doing an amazing job. I know its there , but its not bad. \"     OBJECTIVE        31 min Therapeutic Exercise:  [] See flow sheet :   Rationale: increase ROM, increase strength, improve coordination, improve balance and increase proprioception to improve the patients ability to perform pain free ADL's          15 min Neuromuscular Re-education:  []  See flow sheet :   Rationale: increase ROM, increase strength, improve coordination, improve balance and increase proprioception  to improve the patients ability to perform pain free ADLS'     10 min Manual Therapy:   Manual stretching of left posterior hip capsule in right s/l, 1 person rib mobs. Rationale: decrease pain, increase ROM and increase tissue extensibility to improve the patients ability to perform pain free ADL's   The manual therapy interventions were performed at a separate and distinct time from the therapeutic activities interventions.           With   [] TE   [] TA   [] neuro   [] other: Patient Education: [x] Review HEP    [] Progressed/Changed HEP based on:   [] positioning   [] body mechanics   [] transfers   [] heat/ice application    [] other:      Other Objective/Functional Measures:   LTR Right 16 Left 16   Challenged with 90/90 cross and reach     Pain Level (0-10 scale) post treatment: 0    ASSESSMENT/Changes in Function:   Pt presented in therapy with no c/o pain. Stated he \"knows its there\" but no pain. Pt has demonstrated excellent progression with therapy. continues to report slight discomfort in left low back/SI with standing. Noted restriction in left posterior hip capsule. Pt tolerated all treatment very well. Patient will continue to benefit from skilled PT services to modify and progress therapeutic interventions, address functional mobility deficits, address ROM deficits, address strength deficits, analyze and address soft tissue restrictions, analyze and cue movement patterns, analyze and modify body mechanics/ergonomics, assess and modify postural abnormalities, address imbalance/dizziness and instruct in home and community integration to attain remaining goals. [x]  See Plan of Care  []  See progress note/recertification  []  See Discharge Summary         Progress towards goals / Updated goals:      Short Term Goals: STG- To be accomplished in 5 treatments(s):  1.  Pt will be compliant with HEP to encourage prophylaxis. Eval:dispensed, to be updated   Last PN compliance per pt report goal MET 10/29/21     Long Term Goals: LTG- To be accomplished in 10 treatment(s):  1.  Pt will improved trunk rotation to 16 in to indicate mobility needed for gait. Eval:Right: 19.5 in       Left: 19 in with pain   Last PN 18in left, 18.5in right no pain progressing 10/29/21  Current: LTR right 16 , left 16 : progressing 11/5/21     2.  Pt will be able to lift 30-40 pounds with good mechanics and without pain to indicate tolerance to work related activities. Eval:Pain with >20 pounds  Last PN no pain with lifting 30# however decreased WB on left LE, improved with cues progressing 10/29/21     3.  Pt will be able to bridge to full height without pain or excessive lordosis to indicate functional glut and hamstring strength for daily activities.   Eval:Bridging: decreased height and increased lumbar extension, no pain   Last PN increased lumbar extension at end range for bridge needing cues to correct progressing well 10/29/21     4.  Pt FOTO score will increase by 12 points to show improvement in function.   Eval:58  Last PN 61 progressing 10/13/21       PLAN  []  Upgrade activities as tolerated     [x]  Continue plan of care  []  Update interventions per flow sheet       []  Discharge due to:_  []  Other:_      Lavaun Situ, PTA 11/5/2021  1:28 PM    Future Appointments   Date Time Provider [place]   11/5/2021  1:30 PM Rosaura Kwok [facility]   11/9/2021  2:15 PM Yetta Barley, PTA [facility]   11/12/2021  2:15 PM Yetta Barley, PTA [facility]   11/16/2021  7:45 AM Yetta Barley, PTA [facility]   11/18/2021  3:00 PM Yannick William [facility]   11/23/2021  8:30 AM Yetta Barley, PTA [facility]   11/30/2021  7:45 AM Yetta Barley, PTA [facility]   12/2/2021  4:30 PM Yetta Barley, PTA [facility]   12/7/2021  8:30 AM Cuco Alonzo, PTA [facility]

## 2021-11-09 ENCOUNTER — HOSPITAL ENCOUNTER (OUTPATIENT)
Dept: PHYSICAL THERAPY | Age: 71
Discharge: HOME | End: 2021-11-09
Attending: UROLOGY
Payer: MEDICARE

## 2021-11-09 PROCEDURE — 97110 THERAPEUTIC EXERCISES: CPT

## 2021-11-09 PROCEDURE — 97112 NEUROMUSCULAR REEDUCATION: CPT

## 2021-11-09 PROCEDURE — 97140 MANUAL THERAPY 1/> REGIONS: CPT

## 2021-11-09 PROCEDURE — 97530 THERAPEUTIC ACTIVITIES: CPT

## 2021-11-09 NOTE — PROGRESS NOTES
PT DAILY TREATMENT NOTE    Patient Name: Kika Erp  Date:2021  : 1950  [x]  Patient  Verified  Payor: Brittneyemiliano Medici / Plan: [place] / Product Type: Medicare /    In time:2:18  Out time:3:23  Total Treatment Time (min): 65  Total Timed Codes (min): 55  1:1 Treatment Time (MC/BCBS only): 55   Visit #: 10 of 17    Treatment Dx: Other low back pain [M54.59]    SUBJECTIVE  Pain Level (0-10 scale): 0  Any medication changes, allergies to medications, adverse drug reactions, diagnosis change, or new procedure performed?: [x] No    [] Yes (see summary sheet for update)  Subjective functional status/changes:   [] No changes reported  \"I know its there but its not bad. \"     OBJECTIVE    Modalities Rationale:     decrease edema, decrease inflammation, decrease pain, increase tissue extensibility and increase muscle contraction/control to improve patient's ability to perform pain free ADL's    min [] Estim, type/location:                                      []  att     []  unatt     []  w/US     []  w/ice    []  w/heat    min []  Mechanical Traction: type/lbs                   []  pro   []  sup   []  int   []  cont    []  before manual    []  after manual    min []  Ultrasound, settings/location:      min []  Iontophoresis w/ dexamethasone, location:                                               []  take home patch       []  in clinic   10 min []  Ice     [x]  Heat    location/position:  To left hip in right side lying    min []  Vasopneumatic Device, press/temp:    If using vaso (only need to measure limb vaso being performed on)      pre-treatment girth :       post-treatment girth :       measured at (landmark location) :      min []  Other:    [] Skin assessment post-treatment (if applicable):    []  intact    []  redness- no adverse reaction                  []redness  adverse reaction:              15 min Therapeutic Exercise:  [] See flow sheet :   Rationale: increase ROM, increase strength, improve coordination and improve balance to improve the patients ability to perform pain free ADL's     10 min Therapeutic Activity:  []  See flow sheet :   Rationale: increase ROM, increase strength, improve coordination and improve balance  to improve the patients ability to perform pain free ADL's      20 min Neuromuscular Re-education:  []  See flow sheet :   Rationale: increase ROM, increase strength, improve coordination, improve balance and increase proprioception  to improve the patients ability to perform pain free ADl's     10 min Manual Therapy:  STM to left glut and piriformis in right sidel jose    Rationale: decrease pain, increase ROM and increase tissue extensibility to improve the patients ability to perform pain free ADl's   The manual therapy interventions were performed at a separate and distinct time from the therapeutic activities interventions. With   [] TE   [] TA   [] neuro   [] other: Patient Education: [x] Review HEP    [] Progressed/Changed HEP based on:   [] positioning   [] body mechanics   [] transfers   [] heat/ice application    [] other:      Other Objective/Functional Measures:   LTR Right 16 Left  16    Pain Level (0-10 scale) post treatment: 0    ASSESSMENT/Changes in Function:   Pt reported no pain this session. Stated tension and tightness in left SI. Challenged with left posterior hip stretching. Noted tension and tone in left glut/ piriformis with TTP . Patient will continue to benefit from skilled PT services to modify and progress therapeutic interventions, address functional mobility deficits, address ROM deficits, address strength deficits, analyze and address soft tissue restrictions, analyze and cue movement patterns, analyze and modify body mechanics/ergonomics, assess and modify postural abnormalities, address imbalance/dizziness and instruct in home and community integration to attain remaining goals.      [x]  See Plan of Care  []  See progress note/recertification  []  See Discharge Summary         Progress towards goals / Updated goals:       Short Term Goals: STG- To be accomplished in 5 treatments(s):  1.  Pt will be compliant with HEP to encourage prophylaxis. Eval:dispensed, to be updated   Last PN compliance per pt report goal MET 10/29/21     Long Term Goals: LTG- To be accomplished in 10 treatment(s):  1.  Pt will improved trunk rotation to 16 in to indicate mobility needed for gait. Eval:Right: 19.5 in       Left: 19 in with pain   Last PN 18in left, 18.5in right no pain progressing 10/29/21  Current: LTR right 16 , left 16 : progressing 11/9/21     2.  Pt will be able to lift 30-40 pounds with good mechanics and without pain to indicate tolerance to work related activities. Eval:Pain with >20 pounds  Last PN no pain with lifting 30# however decreased WB on left LE, improved with cues progressing 10/29/21     3.  Pt will be able to bridge to full height without pain or excessive lordosis to indicate functional glut and hamstring strength for daily activities. Eval:Bridging: decreased height and increased lumbar extension, no pain   Last PN increased lumbar extension at end range for bridge needing cues to correct progressing well 10/29/21     4.  Pt FOTO score will increase by 12 points to show improvement in function.   Eval:58  Last PN 61 progressing 10/13/21  Current: assess next visit :progressing 11/9/21       PLAN  []  Upgrade activities as tolerated     [x]  Continue plan of care  []  Update interventions per flow sheet       []  Discharge due to:_  []  Other:_      Marbin Chase PTA 11/9/2021  2:23 PM    Future Appointments   Date Time Provider [place]   11/12/2021  2:15 PM Luana [facility]   11/16/2021  7:45 AM KAILYN Simpson [facility]   11/18/2021  3:00 PM Cristiane William [facility]   11/23/2021  8:30 AM KAILYN Simpson [facility]   11/30/2021  7:45 AM KAILYN Hilario [facility] 12/2/2021  4:30 PM Hector Powers [facility]   12/7/2021  8:30 AM Randa Wheatley PTA [facility]

## 2021-11-12 ENCOUNTER — HOSPITAL ENCOUNTER (OUTPATIENT)
Dept: PHYSICAL THERAPY | Age: 71
Discharge: HOME | End: 2021-11-12
Attending: UROLOGY
Payer: MEDICARE

## 2021-11-12 PROCEDURE — 97140 MANUAL THERAPY 1/> REGIONS: CPT

## 2021-11-12 PROCEDURE — 97112 NEUROMUSCULAR REEDUCATION: CPT

## 2021-11-12 PROCEDURE — 97110 THERAPEUTIC EXERCISES: CPT

## 2021-11-12 NOTE — PROGRESS NOTES
PT DAILY TREATMENT NOTE    Patient Name: Kaylee Sauer  Date:2021  : 1950  [x]  Patient  Verified  Payor: Francisco Venicebonifacio / Plan: [place] MEDICARE PART A & B / Product Type: Medicare /    In time:2:15  Out time:3:18  Total Treatment Time (min): 63  Total Timed Codes (min): 53  1:1 Treatment Time (MC/BCBS only): 53   Visit #: 11 of 17    Treatment Dx: Other low back pain [M54.59]    SUBJECTIVE  Pain Level (0-10 scale): 0  Any medication changes, allergies to medications, adverse drug reactions, diagnosis change, or new procedure performed?: [x] No    [] Yes (see summary sheet for update)  Subjective functional status/changes:   [] No changes reported  \"I was sore, but that's it. \"     OBJECTIVE      33 min Therapeutic Exercise:  [] See flow sheet :   Rationale: increase ROM, increase strength, improve coordination, improve balance and increase proprioception to improve the patients ability to perform pain free ADL's       20 min Neuromuscular Re-education:  []  See flow sheet :   Rationale: increase ROM, increase strength, improve coordination, improve balance and increase proprioception  to improve the patients ability to perform pain free ALD's     10 min Manual Therapy:    STM to left glut and piriformis in right sidel jose   Left posterior hip capsule stretching    Rationale: decrease pain, increase ROM, increase tissue extensibility and decrease trigger points to improve the patients ability to perform pain free ADL's   The manual therapy interventions were performed at a separate and distinct time from the therapeutic activities interventions.           With   [] TE   [] TA   [] neuro   [] other: Patient Education: [x] Review HEP    [] Progressed/Changed HEP based on:   [] positioning   [] body mechanics   [] transfers   [] heat/ice application    [] other:      Other Objective/Functional Measures:   LTR Right 16 Left 15.5     Pain Level (0-10 scale) post treatment: 0    ASSESSMENT/Changes in Function: Pt presented in therapy with reports of muscle soreness but no significant pain. Pt was challenged with right glut max activation and very fatigued with left glut med. Transition to stand next visit. Patient will continue to benefit from skilled PT services to modify and progress therapeutic interventions, address functional mobility deficits, address ROM deficits, address strength deficits, analyze and address soft tissue restrictions, analyze and cue movement patterns, analyze and modify body mechanics/ergonomics, assess and modify postural abnormalities, address imbalance/dizziness and instruct in home and community integration to attain remaining goals. [x]  See Plan of Care  []  See progress note/recertification  []  See Discharge Summary         Progress towards goals / Updated goals:       Short Term Goals: STG- To be accomplished in 5 treatments(s):  1.  Pt will be compliant with HEP to encourage prophylaxis. Eval:dispensed, to be updated   Last PN compliance per pt report goal MET 10/29/21     Long Term Goals: LTG- To be accomplished in 10 treatment(s):  1.  Pt will improved trunk rotation to 16 in to indicate mobility needed for gait. Eval:Right: 19.5 in       Left: 19 in with pain   Last PN 18in left, 18.5in right no pain progressing 10/29/21  Current: LTR Right 16 Left 15.5 : progressing 11/12/21     2.  Pt will be able to lift 30-40 pounds with good mechanics and without pain to indicate tolerance to work related activities. Eval:Pain with >20 pounds  Last PN no pain with lifting 30# however decreased WB on left LE, improved with cues progressing 10/29/21     3.  Pt will be able to bridge to full height without pain or excessive lordosis to indicate functional glut and hamstring strength for daily activities.   Eval:Bridging: decreased height and increased lumbar extension, no pain   Last PN increased lumbar extension at end range for bridge needing cues to correct progressing well 10/29/21     4.  Pt FOTO score will increase by 12 points to show improvement in function.   Eval:58  Last PN 61 progressing 10/13/21  Current: assess next visit :progressing 11/9/21       PLAN  []  Upgrade activities as tolerated     [x]  Continue plan of care  []  Update interventions per flow sheet       []  Discharge due to:_  []  Other:_      Harshal Luis PTA 11/12/2021  1:31 PM    Future Appointments   Date Time Provider [place]   11/12/2021  2:15 PM Lillie Mckinney [facility]   11/16/2021  7:45 AM Tiffany Friday, PTA [facility]   11/18/2021  3:00 PM Lokesh William [facility]   11/23/2021  8:30 AM [place] Friday, PTA [facility]   11/30/2021  7:45 AM [place] Friday, PTA [facility]   12/2/2021  4:30 PM [place] Friday, PTA [facility]   12/7/2021  8:30 AM Laurence Peters, PTA [facility]

## 2021-11-16 ENCOUNTER — HOSPITAL ENCOUNTER (OUTPATIENT)
Dept: PHYSICAL THERAPY | Age: 71
Discharge: HOME | End: 2021-11-16
Attending: UROLOGY
Payer: MEDICARE

## 2021-11-16 PROCEDURE — 97112 NEUROMUSCULAR REEDUCATION: CPT

## 2021-11-16 PROCEDURE — 97140 MANUAL THERAPY 1/> REGIONS: CPT

## 2021-11-16 PROCEDURE — 97110 THERAPEUTIC EXERCISES: CPT

## 2021-11-16 NOTE — PROGRESS NOTES
PT DAILY TREATMENT NOTE    Patient Name: Carlee Marin  Date:2021  : 1950  [x]  Patient  Verified  Payor: Maricarmen San / Plan: [place] MEDIC[place] PART A & B / Product Type: Medicare /    In time:7:40  Out time:8:35  Total Treatment Time (min): 55  Total Timed Codes (min): 55  1:1 Treatment Time (MC/BCBS only): 55   Visit #: 12 of 17    Treatment Dx: Other low back pain [M54.59]    SUBJECTIVE  Pain Level (0-10 scale): 0.5  Any medication changes, allergies to medications, adverse drug reactions, diagnosis change, or new procedure performed?: [x] No    [] Yes (see summary sheet for update)  Subjective functional status/changes:   [] No changes reported  \"I know its there. \"     OBJECTIVE          20 min Therapeutic Exercise:  [] See flow sheet :   Rationale: increase ROM and increase proprioception to improve the patients ability to Perform pain free ADl's       27 min Neuromuscular Re-education:  []  See flow sheet :   Rationale: increase ROM, increase strength, improve coordination, improve balance and increase proprioception  to improve the patients ability to perform pain free ADL's     8 min Manual Therapy:      STM to left glut and piriformis in right sidel jose   Left posterior hip capsule stretching    Rationale: decrease pain, increase ROM, increase tissue extensibility, decrease edema , correct positional vertigo, decrease trigger points and increase postural awareness to improve the patients ability to perform pain free ADL's   The manual therapy interventions were performed at a separate and distinct time from the therapeutic activities interventions.     With   [] TE   [] TA   [] neuro   [] other: Patient Education: [x] Review HEP    [] Progressed/Changed HEP based on:   [] positioning   [] body mechanics   [] transfers   [] heat/ice application    [] other:      Other Objective/Functional Measures:   LTR right 16  Left 15     Pain Level (0-10 scale) post treatment: 0    ASSESSMENT/Changes in Function:   Pt presented in therapy with no significant reports of pain. Stated mild soreness in left hip/SI. Noted some difficultt with BOSU step up and 90/90 hip shifting. Difficulty with coordination and hip shift , no pain. Patient will continue to benefit from skilled PT services to modify and progress therapeutic interventions, address functional mobility deficits, address ROM deficits, address strength deficits, analyze and address soft tissue restrictions, analyze and cue movement patterns, analyze and modify body mechanics/ergonomics, assess and modify postural abnormalities, address imbalance/dizziness and instruct in home and community integration to attain remaining goals. [x]  See Plan of Care  []  See progress note/recertification  []  See Discharge Summary         Progress towards goals / Updated goals:     Short Term Goals: STG- To be accomplished in 5 treatments(s):  1.  Pt will be compliant with HEP to encourage prophylaxis. Eval:dispensed, to be updated   Last PN compliance per pt report goal MET 10/29/21     Long Term Goals: LTG- To be accomplished in 10 treatment(s):  1.  Pt will improved trunk rotation to 16 in to indicate mobility needed for gait. Eval:Right: 19.5 in       Left: 19 in with pain   Last PN 18in left, 18.5in right no pain progressing 10/29/21  Current: LTR Right 16          Left 15 : progressing 11/16/21     2.  Pt will be able to lift 30-40 pounds with good mechanics and without pain to indicate tolerance to work related activities. Eval:Pain with >20 pounds  Last PN no pain with lifting 30# however decreased WB on left LE, improved with cues progressing 10/29/21     3.  Pt will be able to bridge to full height without pain or excessive lordosis to indicate functional glut and hamstring strength for daily activities.   Eval:Bridging: decreased height and increased lumbar extension, no pain   Last PN increased lumbar extension at end range for bridge needing cues to correct progressing well 10/29/21  Current: Pt able to perform 20 bridges with no pain and good height : MET 11/16/21     4.  Pt FOTO score will increase by 12 points to show improvement in function.   Eval:58  Last PN 61 progressing 10/13/21  Current: assess next visit :progressing 11/16/21       PLAN  []  Upgrade activities as tolerated     [x]  Continue plan of care  []  Update interventions per flow sheet       []  Discharge due to:_  []  Other:_      Matthew Lee PTA 11/16/2021  7:49 AM    Future Appointments   Date Time Provider [place]   11/18/2021  3:00 PM Melissa William [facility]   11/23/2021  8:30 AM Dot Clinton PTA [facility]   11/30/2021  7:45 AM Dot Clinton [facility]   12/2/2021  4:30 PM Dot Clinton PTA [facility]   12/7/2021  8:30 AM Jarrod Betancourt PTA [facility]

## 2021-11-18 ENCOUNTER — APPOINTMENT (OUTPATIENT)
Dept: PHYSICAL THERAPY | Age: 71
End: 2021-11-18
Attending: UROLOGY
Payer: MEDICARE

## 2021-11-19 ENCOUNTER — APPOINTMENT (OUTPATIENT)
Dept: PHYSICAL THERAPY | Age: 71
End: 2021-11-19
Attending: UROLOGY
Payer: MEDICARE

## 2021-11-23 ENCOUNTER — HOSPITAL ENCOUNTER (OUTPATIENT)
Dept: PHYSICAL THERAPY | Age: 71
Discharge: HOME | End: 2021-11-23
Attending: UROLOGY
Payer: MEDICARE

## 2021-11-23 PROCEDURE — 97110 THERAPEUTIC EXERCISES: CPT

## 2021-11-23 PROCEDURE — 97140 MANUAL THERAPY 1/> REGIONS: CPT

## 2021-11-23 PROCEDURE — 97112 NEUROMUSCULAR REEDUCATION: CPT

## 2021-11-23 NOTE — PROGRESS NOTES
[facility] at the [address], [place], [address] [facility]  Phone: [phone]      Fax:  [phone]    Progress Note  Patient name: Tabatha Meng Start of Care: 10/1/21   Referral source: Daniel Arrington MD : 1950   Medical/Treatment Diagnosis: Low back pain, unspecified [M54.50] Onset Date: ~4 weeks ago      Prior Hospitalization: see medical history Provider#: [id number]   Medications: Verified on Patient Summary List     Comorbidities: HTN, Hx of LBP, Previous surgeries, BMI >30   Prior Level of Function: no pain with lifting >40 pounds, walking for exercise and stair negotiation        Visits from Start of Care: 13    Missed Visits: 0    Progress Towards Goals:   Short Term Goals: STG- To be accomplished in 5 treatments(s):  1.  Pt will be compliant with HEP to encourage prophylaxis. Eval:dispensed, to be updated   Last PN compliance per pt report goal MET 10/29/21     Long Term Goals: LTG- To be accomplished in 10 treatment(s):  1.  Pt will improved trunk rotation to 16 in to indicate mobility needed for gait. Eval:Right: 19.5 in       Left: 19 in with pain   Last PN 18in left, 18.5in right no pain progressing 10/29/21  Current: LTR 16 inches bilaterally   MET 21     2.  Pt will be able to lift 30-40 pounds with good mechanics and without pain to indicate tolerance to work related activities. Eval:Pain with >20 pounds  Last PN no pain with lifting 30# however decreased WB on left LE, improved with cues progressing 10/29/21  Current; Pt able to  30 # with no pain: MET 21     3.  Pt will be able to bridge to full height without pain or excessive lordosis to indicate functional glut and hamstring strength for daily activities.   Eval:Bridging: decreased height and increased lumbar extension, no pain   Last PN increased lumbar extension at end range for bridge needing cues to correct progressing well 10/29/21  Current: Pt able to perform 20 bridges with no pain and good height : MET 11/16/21     4.  Pt FOTO score will increase by 12 points to show improvement in function. Eval:58  Last PN 61 progressing 10/13/21  Current: assess Next visit      Updated Goals:   1. Pt will be able to walk 2 miles with out pain to allow for safe and pain free return to exercise. Current: unable to walk > 1 mile without pain     2. Pt will be able to lay supine without pain to return to PLOF  Current: pain in left SI with laying down       Key Functional Changes:     LTR right 16   Left 16   Lifting 30 pounds: good weight bearing and no pain      Pt presented to therapy C/C of Left sided LBP, gradual and insidious in onset. Pt has been seen in therapy 13x including initial eval. Pt reported 90% improvement in sx's since starting therapy. Pt is now able to touch toes with lumbar flexion without pain however continued trunk rotation limitation possibly due to continued tension in left posterior hip. Improving height with bridges indicating improved glut and abdominal strength. Pt able to  30# DB with less tightness with increasing WB through left and no pain. LE. Pt has reported some pain on left SI when laying flat in supine to sleep, and with prolonged walking.    Pt would benefit from continued skilled PT services to address remaining unmet goals and allow pt to complete ADLs without pain or tightness.       Updated Goals: to be achieved in 6 weeks:   Same as above  ASSESSMENT/RECOMMENDATIONS:  [x]Continue therapy per initial plan/protocol at a frequency of  2 x per week for 6 weeks  []Continue therapy with the following recommended changes:_____________________      _____________________________________________________________________  []Discontinue therapy progressing towards or have reached established goals  []Discontinue therapy due to lack of appreciable progress towards goals  []Discontinue therapy due to lack of attendance or compliance  []Await Physician's recommendations/decisions regarding therapy  []Other:________________________________________________________________    Thank you for this referral.   rAamis Dowling PTA 11/23/2021 9:21 AM

## 2021-11-23 NOTE — PROGRESS NOTES
PT DAILY TREATMENT NOTE    Patient Name: King Epley  Date:2021  : 1950  [x]  Patient  Verified  Payor: Stephanie Smallwood / Plan: [place] / Product Type: Medicare /    In time:8:27  Out time:9:15  Total Treatment Time (min): 48  Total Timed Codes (min): 48  1:1 Treatment Time (MC/BCBS only): 48   Visit #: 13 of 17    Treatment Dx: Other low back pain [M54.59]    SUBJECTIVE  Pain Level (0-10 scale): 0  Any medication changes, allergies to medications, adverse drug reactions, diagnosis change, or new procedure performed?: [x] No    [] Yes (see summary sheet for update)  Subjective functional status/changes:   [] No changes reported  \"very little pain. I feel it more when I lay on my back. \"     OBJECTIVE        20 min Therapeutic Exercise:  [] See flow sheet :   Rationale: increase ROM, increase strength, improve coordination, improve balance and increase proprioception to improve the patients ability to perform pain free ADL's          18 min Neuromuscular Re-education:  []  See flow sheet :   Rationale: increase ROM, increase strength, improve coordination, improve balance and increase proprioception  to improve the patients ability to perform pain free ADL\"S     10 min Manual Therapy:   STM to left glut and piriformis in right sidel jose   Left posterior hip capsule stretching    Rationale: decrease pain and increase postural awareness to improve the patients ability to perform pain free ADL\"S   The manual therapy interventions were performed at a separate and distinct time from the therapeutic activities interventions.                 With   [x] TE   [] TA   [] neuro   [] other: Patient Education: [x] Review HEP    [] Progressed/Changed HEP based on:   [] positioning   [] body mechanics   [] transfers   [] heat/ice application    [] other:      Other Objective/Functional Measures:   LTR right 16 Left 16     FOTO Not assessed *  Lifting 30 pounds: good weight bearing and no pain Pain Level (0-10 scale) post treatment: 0    ASSESSMENT/Changes in Function:   Pt presented to therapy C/C of Left sided LBP, gradual and insidious in onset. Pt has been seen in therapy 13x including initial eval. Pt reported 90% improvement in sx's since starting therapy. Pt is now able to touch toes with lumbar flexion without pain however continued trunk rotation limitation possibly due to continued tension in left posterior hip. Improving height with bridges indicating improved glut and abdominal strength. Pt able to  30# DB with less tightness with increasing WB through left and no pain. LE. Pt has reported some pain on left SI when laying flat in supine to sleep, and with prolonged walking. Pt would benefit from continued skilled PT services to address remaining unmet goals and allow pt to complete ADLs without pain or tightness.          Patient will continue to benefit from skilled PT services to modify and progress therapeutic interventions, address functional mobility deficits, address ROM deficits, address strength deficits, analyze and address soft tissue restrictions, analyze and cue movement patterns, analyze and modify body mechanics/ergonomics, assess and modify postural abnormalities, address imbalance/dizziness and instruct in home and community integration to attain remaining goals. [x]  See Plan of Care  []  See progress note/recertification  []  See Discharge Summary         Progress towards goals / Updated goals:    Short Term Goals: STG- To be accomplished in 5 treatments(s):  1.  Pt will be compliant with HEP to encourage prophylaxis. Eval:dispensed, to be updated   Last PN compliance per pt report goal MET 10/29/21     Long Term Goals: LTG- To be accomplished in 10 treatment(s):  1.  Pt will improved trunk rotation to 16 in to indicate mobility needed for gait.   Eval:Right: 19.5 in       Left: 19 in with pain   Last PN 18in left, 18.5in right no pain progressing 10/29/21  Current: LTR 16 inches bilaterally   MET 11/23/21     2.  Pt will be able to lift 30-40 pounds with good mechanics and without pain to indicate tolerance to work related activities. Eval:Pain with >20 pounds  Last PN no pain with lifting 30# however decreased WB on left LE, improved with cues progressing 10/29/21  Current; Pt able to  30 # with no pain: MET 11/23/21     3.  Pt will be able to bridge to full height without pain or excessive lordosis to indicate functional glut and hamstring strength for daily activities. Eval:Bridging: decreased height and increased lumbar extension, no pain   Last PN increased lumbar extension at end range for bridge needing cues to correct progressing well 10/29/21  Current: Pt able to perform 20 bridges with no pain and good height : MET 11/16/21     4.  Pt FOTO score will increase by 12 points to show improvement in function.   Eval:58  Last PN 61 progressing 10/13/21  Current: assess Next visit      PLAN  []  Upgrade activities as tolerated     [x]  Continue plan of care  []  Update interventions per flow sheet       []  Discharge due to:_  []  Other:_      Lesley Rodriguez PTA 11/23/2021  7:49 AM    Future Appointments   Date Time Provider Quincy Mari   11/23/2021  8:30 AM Dominick [facility]   11/30/2021  7:45 AM KAILYN [facility]LLI [facility]   12/2/2021  4:30 PM Dreeddy [facility]   12/7/2021  8:30 AM KAILYN [facility]PHOLLI [facility]   12/10/2021 11:30 AM Teri Ta PTA [facility]

## 2021-11-30 ENCOUNTER — HOSPITAL ENCOUNTER (OUTPATIENT)
Dept: PHYSICAL THERAPY | Age: 71
Discharge: HOME | End: 2021-11-30
Attending: UROLOGY
Payer: MEDICARE

## 2021-11-30 PROCEDURE — 97110 THERAPEUTIC EXERCISES: CPT

## 2021-11-30 PROCEDURE — 97140 MANUAL THERAPY 1/> REGIONS: CPT

## 2021-11-30 PROCEDURE — 97530 THERAPEUTIC ACTIVITIES: CPT

## 2021-11-30 PROCEDURE — 97112 NEUROMUSCULAR REEDUCATION: CPT

## 2021-11-30 NOTE — PROGRESS NOTES
PT DAILY TREATMENT NOTE    Patient Name: Jimi Queen  Date:2021  : 1950  [x]  Patient  Verified  Payor: Ly Ash / Plan: [place] / Product Type: Medicare /    In time:7:37  Out time:830  Total Treatment Time (min): 53  Total Timed Codes (min): 53  1:1 Treatment Time (MC/BCBS only): 53   Visit #: 14 of 25    Treatment Dx: Other low back pain [M54.59]    SUBJECTIVE  Pain Level (0-10 scale): 1  Any medication changes, allergies to medications, adverse drug reactions, diagnosis change, or new procedure performed?: [x] No    [] Yes (see summary sheet for update)  Subjective functional status/changes:   [] No changes reported  \"Im doing pretty good. I I was able to walk 1.5 miles. \"     OBJECTIVE      15 min Therapeutic Exercise:  [] See flow sheet :   Rationale: increase ROM, increase strength, improve coordination, improve balance and increase proprioception to improve the patients ability to perform pain free ADl's     15 min Therapeutic Activity:  [x]  See flow sheet :   Rationale: increase ROM, increase strength, improve coordination, improve balance and increase proprioception  to improve the patients ability to perform pain free ADL's      15 min Neuromuscular Re-education:  [x]  See flow sheet :   Rationale: increase ROM, increase strength, improve coordination, improve balance and increase proprioception  to improve the patients ability to perform pain free ADL\"s     8 min Manual Therapy:   STM to left glut and piriformis in right sidel jose   Left posterior hip capsule stretching   Rationale: decrease pain, increase ROM, increase tissue extensibility, decrease trigger points and increase postural awareness to improve the patients ability to perform pain free ADL\"s   The manual therapy interventions were performed at a separate and distinct time from the therapeutic activities interventions.         With   [x] TE   [] TA   [] neuro   [] other: Patient Education: [x] Review HEP    [] Progressed/Changed HEP based on:   [] positioning   [] body mechanics   [] transfers   [] heat/ice application    [] other:      Other Objective/Functional Measures:   KORI Special Tests (pre/post)    Hip Add Drop Test:     Right: +/mid            Left:+/mid   Hp IR                            Right : 18          Left: 15      Pain Level (0-10 scale) post treatment: 0    ASSESSMENT/Changes in Function:   Pt presented in clinic with no significant pain. Stated he was able to walk 1.5 miles with out pain over the weekend. Added dynamic warm this session. Pt was challenged to fatigue with lateral and retro walking with decreased hip mobility and balance. Overall pt is progressing well , plan to progress program appropriately. Patient will continue to benefit from skilled PT services to modify and progress therapeutic interventions, address functional mobility deficits, address ROM deficits, address strength deficits, analyze and address soft tissue restrictions, analyze and cue movement patterns, analyze and modify body mechanics/ergonomics, assess and modify postural abnormalities, address imbalance/dizziness and instruct in home and community integration to attain remaining goals. [x]  See Plan of Care  []  See progress note/recertification  []  See Discharge Summary         Progress towards goals / Updated goals:  Short Term Goals: STG- To be accomplished in 5 treatments(s):  1.  Pt will be compliant with HEP to encourage prophylaxis. Eval:dispensed, to be updated   Last PN compliance per pt report goal MET 10/29/21     Long Term Goals: LTG- To be accomplished in 10 treatment(s):  1.  Pt will improved trunk rotation to 16 in to indicate mobility needed for gait.   Eval:Right: 19.5 in       Left: 19 in with pain   Last PN  LTR 16 inches bilaterally   MET 11/23/21     2.  Pt will be able to lift 30-40 pounds with good mechanics and without pain to indicate tolerance to work related activities. Eval:Pain with >20 pounds  Last PN Pt able to  30 # with no pain: MET 11/23/21     3.  Pt will be able to bridge to full height without pain or excessive lordosis to indicate functional glut and hamstring strength for daily activities. Eval:Bridging: decreased height and increased lumbar extension, no pain   Last PN  Pt able to perform 20 bridges with no pain and good height : MET 11/16/21     4.  Pt FOTO score will increase by 12 points to show improvement in function. Eval:58  Last PN assess Next visit      Updated Goals:   1. Pt will be able to walk 2 miles with out pain to allow for safe and pain free return to exercise. LAst PN: unable to walk > 1 mile without pain   Current: Pt was able to walk 1.5 miles : progressing 11/30/21     2.  Pt will be able to lay supine without pain to return to PLOF  Last PN pain in left SI with laying down        PLAN  []  Upgrade activities as tolerated     [x]  Continue plan of care  []  Update interventions per flow sheet       []  Discharge due to:_  []  Other:_      Caridad Estrada PTA 11/30/2021  7:48 AM    Future Appointments   Date Time Provider Quincy Mari   12/2/2021  4:30 PM Rosaura SURESH [facility]   12/7/2021  8:30 AM KAILYN Miranda [facility]   12/10/2021 11:30 AM KAILYN Ortega Caro [facility]

## 2021-12-02 ENCOUNTER — HOSPITAL ENCOUNTER (OUTPATIENT)
Dept: PHYSICAL THERAPY | Age: 71
Discharge: HOME | End: 2021-12-02
Attending: UROLOGY
Payer: MEDICARE

## 2021-12-02 PROCEDURE — 97112 NEUROMUSCULAR REEDUCATION: CPT

## 2021-12-02 PROCEDURE — 97530 THERAPEUTIC ACTIVITIES: CPT

## 2021-12-02 PROCEDURE — 97110 THERAPEUTIC EXERCISES: CPT

## 2021-12-02 NOTE — PROGRESS NOTES
PT DAILY TREATMENT NOTE    Patient Name: Ritchie Mays  Date:2021  : 1950  [x]  Patient  Verified  Payor: Emely Roche / Plan: [place] PART [place] / Product Type: Medicare /    In time:4:12  Out time:5:05  Total Treatment Time (min): 53  Total Timed Codes (min): 53  1:1 Treatment Time (MC/BCBS only): 53   Visit #: 15 of 25    Treatment Dx: Other low back pain [M54.59]    SUBJECTIVE  Pain Level (0-10 scale): 0  Any medication changes, allergies to medications, adverse drug reactions, diagnosis change, or new procedure performed?: [x] No    [] Yes (see summary sheet for update)  Subjective functional status/changes:   [] No changes reported  \"I fel pretty good. \"     OBJECTIVE       15 min Therapeutic Exercise:  [x] See flow sheet :   Rationale: increase ROM, increase strength, improve coordination, improve balance and increase proprioception to improve the patients ability to perform pain free ADL's     15 min Therapeutic Activity:  [x]  See flow sheet :   Rationale: increase ROM, increase strength, improve coordination, improve balance and increase proprioception  to improve the patients ability to perform pain free ADl's      23 min Neuromuscular Re-education:  [x]  See flow sheet :   Rationale: increase ROM, increase strength, improve coordination, improve balance and increase proprioception  to improve the patients ability to perform pain free ADL's             With   [] TE   [x] TA   [] neuro   [] other: Patient Education: [x] Review HEP    [] Progressed/Changed HEP based on:   [] positioning   [] body mechanics   [] transfers   [] heat/ice application    [] other:      Other Objective/Functional Measures:     Hip IR  Right 20 Left 15     Pain Level (0-10 scale) post treatment: 0    ASSESSMENT/Changes in Function:   Pt reported to therapy with no pain. Stated he responded very well to previous session. Pt was challenged with balance and coordination of retro and lateral walking.  Noted difficulty with seated IR strength and decreased ROM. Patient will continue to benefit from skilled PT services to modify and progress therapeutic interventions, address functional mobility deficits, address ROM deficits, address strength deficits, analyze and address soft tissue restrictions, analyze and cue movement patterns, analyze and modify body mechanics/ergonomics, assess and modify postural abnormalities, address imbalance/dizziness and instruct in home and community integration to attain remaining goals. [x]  See Plan of Care  []  See progress note/recertification  []  See Discharge Summary         Progress towards goals / Updated goals:  Short Term Goals: STG- To be accomplished in 5 treatments(s):  1.  Pt will be compliant with HEP to encourage prophylaxis. Eval:dispensed, to be updated   Last PN compliance per pt report goal MET 10/29/21  Current: Pt reported compliance to HEP. :prgoressing 12/2/21     Long Term Goals: LTG- To be accomplished in 10 treatment(s):  1.  Pt will improved trunk rotation to 16 in to indicate mobility needed for gait. Eval:Right: 19.5 in       Left: 19 in with pain   Last PN  LTR 16 inches bilaterally   MET 11/23/21     2.  Pt will be able to lift 30-40 pounds with good mechanics and without pain to indicate tolerance to work related activities. Eval:Pain with >20 pounds  Last PN Pt able to  30 # with no pain: MET 11/23/21     3.  Pt will be able to bridge to full height without pain or excessive lordosis to indicate functional glut and hamstring strength for daily activities. Eval:Bridging: decreased height and increased lumbar extension, no pain   Last PN  Pt able to perform 20 bridges with no pain and good height : MET 11/16/21     4.  Pt FOTO score will increase by 12 points to show improvement in function. Eval:58  Last PN assess Next visit      Updated Goals:   1.  Pt will be able to walk 2 miles with out pain to allow for safe and pain free return to exercise. LAst PN: unable to walk > 1 mile without pain   Current: Pt was able to walk 1.5 miles : progressing 11/30/21     2.  Pt will be able to lay supine without pain to return to PLOF  Last PN pain in left SI with laying down   Current: Pt reported decreased pain in supine however still noticeable  : progressing 12/2/21          PLAN  []  Upgrade activities as tolerated     [x]  Continue plan of care  []  Update interventions per flow sheet       []  Discharge due to:_  []  Other:_      Nasir George PTA 12/2/2021  4:17 PM    Future Appointments   Date Time Provider Quincy Mari   12/2/2021  4:30 PM Lv SURESH [facility]   12/7/2021  8:30 AM KAILYN West [facility]   12/10/2021 11:30 AM KAILYN Wilson [facility]

## 2021-12-07 ENCOUNTER — HOSPITAL ENCOUNTER (OUTPATIENT)
Dept: PHYSICAL THERAPY | Age: 71
Discharge: HOME | End: 2021-12-07
Attending: UROLOGY
Payer: MEDICARE

## 2021-12-07 PROCEDURE — 97110 THERAPEUTIC EXERCISES: CPT

## 2021-12-07 PROCEDURE — 97112 NEUROMUSCULAR REEDUCATION: CPT

## 2021-12-07 PROCEDURE — 97140 MANUAL THERAPY 1/> REGIONS: CPT

## 2021-12-07 NOTE — PROGRESS NOTES
PT DAILY TREATMENT NOTE    Patient Name: Noa Saxena  Date:2021  : 1950  [x]  Patient  Verified  Payor: Sweta Stark / Plan: [place] PART A [place] B / Product Type: Medicare /    In time:8:33  Out time:9:30  Total Treatment Time (min): 57  Total Timed Codes (min): 57  1:1 Treatment Time (MC/BCBS only): 57   Visit #: 16 of 25    Treatment Dx: Other low back pain [M54.59]    SUBJECTIVE  Pain Level (0-10 scale): 1-2  Any medication changes, allergies to medications, adverse drug reactions, diagnosis change, or new procedure performed?: [x] No    [] Yes (see summary sheet for update)  Subjective functional status/changes:   [] No changes reported    \"Its pesky. I feel it. I woke up with it yesterday. \"     OBJECTIVE    Modalities Rationale:     decrease edema, decrease inflammation, decrease pain, increase tissue extensibility and increase muscle contraction/control to improve patient's ability to perform pain free AD:Ls   '   min [] Estim, type/location:                                      []  att     []  unatt     []  w/US     []  w/ice    []  w/heat    min []  Mechanical Traction: type/lbs                   []  pro   []  sup   []  int   []  cont    []  before manual    []  after manual    min []  Ultrasound, settings/location:      min []  Iontophoresis w/ dexamethasone, location:                                               []  take home patch       []  in clinic    min []  Ice     []  Heat    location/position:     min []  Vasopneumatic Device, press/temp:    If using vaso (only need to measure limb vaso being performed on)      pre-treatment girth :       post-treatment girth :       measured at (landmark location) :      min []  Other:    [] Skin assessment post-treatment (if applicable):    []  intact    []  redness- no adverse reaction                  []redness  adverse reaction:            29 min Therapeutic Exercise:  [] See flow sheet :   Rationale: increase ROM, increase strength, improve coordination and improve balance to improve the patients ability to perform pain free ADL's          20 min Neuromuscular Re-education:  []  See flow sheet :   Rationale: increase ROM, increase strength, improve coordination and improve balance  to improve the patients ability to perform pain free ADL\"S     8 min Manual Therapy:  Left posterior hip capsule stretching  STM to left posterior hip / glut / piriformis in right side lying    Rationale: decrease pain, increase ROM and increase tissue extensibility to improve the patients ability to perform pain free ADL\"S   The manual therapy interventions were performed at a separate and distinct time from the therapeutic activities interventions. With   [] TE   [] TA   [] neuro   [] other: Patient Education: [x] Review HEP    [] Progressed/Changed HEP based on:   [] positioning   [] body mechanics   [] transfers   [] heat/ice application    [] other:      Other Objective/Functional Measures:     Hip IR            Right : 20          Left: 15         Pain Level (0-10 scale) post treatment: 0    ASSESSMENT/Changes in Function:   Pt presented in therapy with some c/o left sided SI pain. Stated he woke up with pain. Pt continues to demonstrate tight left posterior hip and weak glut med with decreased left hip IR. Patient will continue to benefit from skilled PT services to modify and progress therapeutic interventions, address functional mobility deficits, address ROM deficits, address strength deficits, analyze and address soft tissue restrictions, analyze and cue movement patterns, analyze and modify body mechanics/ergonomics, assess and modify postural abnormalities, address imbalance/dizziness and instruct in home and community integration to attain remaining goals.      [x]  See Plan of Care  []  See progress note/recertification  []  See Discharge Summary         Progress towards goals / Updated goals:  Short Term Goals: STG- To be accomplished in 5 treatments(s):  1.  Pt will be compliant with HEP to encourage prophylaxis. Eval:dispensed, to be updated   Last PN compliance per pt report goal MET 10/29/21  Current: Pt reported compliance to HEP. however not walking due to the cold  :prgoressing 12/7/21     Long Term Goals: LTG- To be accomplished in 10 treatment(s):  1.  Pt will improved trunk rotation to 16 in to indicate mobility needed for gait. Eval:Right: 19.5 in       Left: 19 in with pain   Last PN  LTR 16 inches bilaterally   MET 11/23/21     2.  Pt will be able to lift 30-40 pounds with good mechanics and without pain to indicate tolerance to work related activities. Eval:Pain with >20 pounds  Last PN Pt able to  30 # with no pain: MET 11/23/21     3.  Pt will be able to bridge to full height without pain or excessive lordosis to indicate functional glut and hamstring strength for daily activities. Eval:Bridging: decreased height and increased lumbar extension, no pain   Last PN  Pt able to perform 20 bridges with no pain and good height : MET 11/16/21     4.  Pt FOTO score will increase by 12 points to show improvement in function. Eval:58  Last PN assess Next visit      Updated Goals:   1. Pt will be able to walk 2 miles with out pain to allow for safe and pain free return to exercise. LAst PN: unable to walk > 1 mile without pain   Current: Pt was able to walk 1.5 miles : progressing 11/30/21     2. Pt will be able to lay supine without pain to return to PLOF  Last PN pain in left SI with laying down   Current: Pt reported decreased pain in supine however still noticeable.  Challenged with sleeping   : progressing 12/7/21          PLAN  []  Upgrade activities as tolerated     [x]  Continue plan of care  []  Update interventions per flow sheet       []  Discharge due to:_  []  Other:_      Corona Almazan PTA 12/7/2021  8:02 AM    Future Appointments   Date Time Provider Quincy Mari   12/7/2021  8:30 AM Kaylyn Pelayo, PTA [facility]   12/14/2021  7:45 AM Kaylyn Fulton PTA [facility]

## 2021-12-10 ENCOUNTER — APPOINTMENT (OUTPATIENT)
Dept: PHYSICAL THERAPY | Age: 71
End: 2021-12-10
Attending: UROLOGY
Payer: MEDICARE

## 2021-12-14 ENCOUNTER — HOSPITAL ENCOUNTER (OUTPATIENT)
Dept: PHYSICAL THERAPY | Age: 71
Discharge: HOME | End: 2021-12-14
Attending: UROLOGY
Payer: MEDICARE

## 2021-12-14 PROCEDURE — 97112 NEUROMUSCULAR REEDUCATION: CPT

## 2021-12-14 PROCEDURE — 97110 THERAPEUTIC EXERCISES: CPT

## 2021-12-14 PROCEDURE — 97140 MANUAL THERAPY 1/> REGIONS: CPT

## 2021-12-14 NOTE — PROGRESS NOTES
PT DAILY TREATMENT NOTE    Patient Name: Lorrie Krueger  Date:2021  : 1950  [x]  Patient  Verified  Payor: Jocelyn Tirado / Plan: [place] B / Product Type: Medicare /    In time:7:45  Out time:8:37  Total Treatment Time (min): 52  Total Timed Codes (min): 52  1:1 Treatment Time (MC/BCBS only): 52   Visit #: 17 of 25    Treatment Dx: Other low back pain [M54.59]    SUBJECTIVE  Pain Level (0-10 scale): 1  Any medication changes, allergies to medications, adverse drug reactions, diagnosis change, or new procedure performed?: [x] No    [] Yes (see summary sheet for update)  Subjective functional status/changes:   [] No changes reported  \"I feel it. It might be form the hip. \"    OBJECTIVE      20 min Therapeutic Exercise:  [] See flow sheet :   Rationale: increase ROM, increase strength, improve coordination and improve balance to improve the patients ability to perform pain free ADl's       22 min Neuromuscular Re-education:  []  See flow sheet :   Rationale: increase strength, improve coordination, improve balance and increase proprioception  to improve the patients ability to perform pain free ADL's     10 min Manual Therapy:  Left posterior hip capsule stretching  STM to left posterior hip / glut / piriformis in right side lying    Rationale: decrease pain, increase ROM and increase tissue extensibility to improve the patients ability to perform pain free ADL's   The manual therapy interventions were performed at a separate and distinct time from the therapeutic activities interventions.               With   [x] TE   [] TA   [] neuro   [] other: Patient Education: [x] Review HEP    [] Progressed/Changed HEP based on:   [] positioning   [] body mechanics   [] transfers   [] heat/ice application    [] other:      Other Objective/Functional Measures:   -challenged with balance on BOSU   -decreased ROM with left hamstring stretch        Pain Level (0-10 scale) post treatment: 0    ASSESSMENT/Changes in Function:   Pt presented in therapy with minimal c/o pain however stated continued discomfort in left low back. Pt continues to tolerate all exercises well , however fatigued after session. Pt reported increased puling in left hip with left posterior hip capsule stretching. Patient will continue to benefit from skilled PT services to modify and progress therapeutic interventions, address functional mobility deficits, address ROM deficits, address strength deficits, analyze and address soft tissue restrictions, analyze and cue movement patterns, analyze and modify body mechanics/ergonomics, assess and modify postural abnormalities, address imbalance/dizziness and instruct in home and community integration to attain remaining goals. [x]  See Plan of Care  []  See progress note/recertification  []  See Discharge Summary         Progress towards goals / Updated goals:    Short Term Goals: STG- To be accomplished in 5 treatments(s):  1.  Pt will be compliant with HEP to encourage prophylaxis. Eval:dispensed, to be updated   Last PN compliance per pt report goal MET 10/29/21  Current: Pt reported compliance to HEP. however not walking due to the cold  :prgoressing 12/14/21     Long Term Goals: LTG- To be accomplished in 10 treatment(s):  1.  Pt will improved trunk rotation to 16 in to indicate mobility needed for gait. Eval:Right: 19.5 in       Left: 19 in with pain   Last PN  LTR 16 inches bilaterally   MET 11/23/21     2.  Pt will be able to lift 30-40 pounds with good mechanics and without pain to indicate tolerance to work related activities. Eval:Pain with >20 pounds  Last PN Pt able to  30 # with no pain: MET 11/23/21     3.  Pt will be able to bridge to full height without pain or excessive lordosis to indicate functional glut and hamstring strength for daily activities.   Eval:Bridging: decreased height and increased lumbar extension, no pain   Last PN  Pt able to perform 20 bridges with no pain and good height : MET 11/16/21     4.  Pt FOTO score will increase by 12 points to show improvement in function. Eval:58  Last PN assess Next visit      Updated Goals:   1. Pt will be able to walk 2 miles with out pain to allow for safe and pain free return to exercise. LAst PN: unable to walk > 1 mile without pain   Current: Pt was able to walk 1.5 miles : progressing 11/30/21     2. Pt will be able to lay supine without pain to return to PLOF  Last PN pain in left SI with laying down   Current: Pt reported decreased pain in supine however still noticeable. Challenged with sleeping   : progressing 12/14/21           PLAN  []  Upgrade activities as tolerated     [x]  Continue plan of care  []  Update interventions per flow sheet       []  Discharge due to:_  []  Other:_      Caridad Estrada, PTA 12/14/2021  7:49 AM    No future appointments.

## 2021-12-28 ENCOUNTER — HOSPITAL ENCOUNTER (OUTPATIENT)
Dept: PHYSICAL THERAPY | Age: 71
Discharge: HOME | End: 2021-12-28
Attending: UROLOGY
Payer: MEDICARE

## 2021-12-28 PROCEDURE — 97530 THERAPEUTIC ACTIVITIES: CPT

## 2021-12-28 PROCEDURE — 97110 THERAPEUTIC EXERCISES: CPT

## 2021-12-28 PROCEDURE — 97112 NEUROMUSCULAR REEDUCATION: CPT

## 2021-12-28 NOTE — PROGRESS NOTES
[facility] at the [address], [place], [address] [facility]  Phone: [phone]      Fax:  [phone]    Continued Plan of Care/ Re-certification for Physical Therapy Services    Progress Note  Patient name: Canelo George Start of Care: 10/1/21   Referral source: Scott Dotson MD : [id number]   Medical/Treatment Diagnosis: Low back pain, unspecified [M54.50] Onset Date: ~4 weeks ago      Prior Hospitalization: see medical history Provider#: [id number]   Medications: Verified on Patient Summary List     Comorbidities: HTN, Hx of LBP, Previous surgeries, BMI >30   Prior Level of Function: no pain with lifting >40 pounds, walking for exercise and stair negotiation          Visits from Start of Care: 18    Missed Visits: 0    Reporting Period: 10/1/21 to 21    The Plan of Care and following information is based on the patient's current status:    Key functional changes:      SLR                             Right: 75                      Left: 73  Trunk Rot                   Right: 15                    Left 15   Hip ER                       Right: 25            Left: 20  Hip IR                          Right : 15                   Left: 11     Problems/ barriers to goal attainment: None     Problem List: pain affecting function, decrease ROM, decrease strength, edema affecting function, impaired gait/ balance, decrease ADL/ functional abilitiies, decrease activity tolerance and decrease flexibility/ joint mobility    Treatment Plan: Therapeutic exercise, Therapeutic activities, Neuromuscular re-education, Physical agent/modality, Gait/balance training, Manual therapy, Patient education, Self Care training, Functional mobility training and Stair training     Goals for this certification period to be accomplished in 6 weeks:  Short Term Goals: STG- To be accomplished in 5 treatments(s):  1.  Pt will be compliant with HEP to encourage prophylaxis.   Eval:dispensed, to be updated   Last PN compliance per pt report goal MET 10/29/21  Current: Pt reported 80-90% compliance to HEP. however not good compliance over the last week. Also not  walking due to the cold  :prgoressing 12/28/21     Long Term Goals: LTG- To be accomplished in 10 treatment(s):  1.  Pt will improved trunk rotation to 16 in to indicate mobility needed for gait. Eval:Right: 19.5 in       Left: 19 in with pain   Last PN  LTR 16 inches bilaterally   MET 11/23/21     2.  Pt will be able to lift 30-40 pounds with good mechanics and without pain to indicate tolerance to work related activities. Eval:Pain with >20 pounds  Last PN Pt able to  30 # with no pain: MET 11/23/21     3.  Pt will be able to bridge to full height without pain or excessive lordosis to indicate functional glut and hamstring strength for daily activities. Eval:Bridging: decreased height and increased lumbar extension, no pain   Last PN  Pt able to perform 20 bridges with no pain and good height : MET 11/16/21     4.  Pt FOTO score will increase by 12 points to show improvement in function. Eval:58  Last PN assess Next visit   Current 61 progressing      Updated Goals:   1. Pt will be able to walk 2 miles with out pain to allow for safe and pain free return to exercise. LAst PN: unable to walk > 1 mile without pain   Current: Pt was able to walk 1.5 miles , however has not been consistent with walking due to the weather: progressing 12/28/21     2. Pt will be able to lay supine without pain to return to PLOF  Last PN pain in left SI with laying down   Current: Pt reported decreased pain in supine however still noticeable.  Challenged with sleeping   : progressing 12/28/21       Frequency / Duration: Patient to be seen 2 times per week for 6 weeks:    Assessment / Recommendations:  Pt initially presented to therapy C/C of Left sided LBP, gradual and insidious in onset. Pt has been seen in therapy 18x including initial eval. Pt reported 90% improvement in sx's since starting therapy. Pt is able to reach toes and perform lateral and retro walking with out pain. Pt continues to report a \"nagging \" pain in left posterior hip/SI that is interfering with sleep however 90% resolved. Pt continues to present with decreased left hip mobility and tension in left posterior hip capsule as well and decreased glut strength . Although pt is making excellent progress in therapy, would benefit from continued session to address remaining ROM and strength deficits.        New Certification Period: 12/14/21- 2/14/22      Babatunde Cheng, [facility] 12/28/2021 9:45 AM    ________________________________________________________________________  I certify that the above Therapy Services are being furnished while the patient is under my care. I agree with the treatment plan and certify that this therapy is necessary. [] I have read the above and request that my patient continue as recommended.   [] I have read the above report and request that my patient continue therapy with the following changes/special instructions: _______________________________________  [] I have read the above report and request that my patient be discharged from therapy    Physician's Signature:____________________ Date:_________ TIME:________                                      Woo Turner MD      ** Signature, Date and Time must be completed for valid certification **    Please sign and return to [facility] at the [address], [facility], [address] [facility]  Phone: [phone]      Fax:  [phone]

## 2021-12-28 NOTE — PROGRESS NOTES
PT DAILY TREATMENT NOTE    Patient Name: Mary Vu  Date:2021  : 1950  [x]  Patient  Verified  Payor: Stalin Iraheta / Plan: [place] MEDICARE PART A & B / Product Type: Medicare /    In time:8:30  Out time:9:30  Total Treatment Time (min): 60  Total Timed Codes (min): 60  1:1 Treatment Time (MC/BCBS only): 60   Visit #: 18 of 25    Treatment Dx: Other low back pain [M54.59]    SUBJECTIVE  Pain Level (0-10 scale): 0-1  Any medication changes, allergies to medications, adverse drug reactions, diagnosis change, or new procedure performed?: [x] No    [] Yes (see summary sheet for update)  Subjective functional status/changes:   [] No changes reported  \"Its just a nagging. I wasn't good. I didn't do my stretches. \"     OBJECTIVE      30 min Therapeutic Exercise:  [] See flow sheet :   Rationale: increase ROM, increase strength, improve coordination and improve balance to improve the patients ability to perform pain free ADL's     15 min Therapeutic Activity:  []  See flow sheet :   Rationale: increase ROM, increase strength, improve coordination and improve balance  to improve the patients ability to perform pain free ADL's      15 min Neuromuscular Re-education:  []  See flow sheet :   Rationale: increase ROM, increase strength, improve coordination, improve balance and increase proprioception  to improve the patients ability to perform pain free ADL's         With   [] TE   [] TA   [] neuro   [] other: Patient Education: [x] Review HEP    [] Progressed/Changed HEP based on:   [] positioning   [] body mechanics   [] transfers   [] heat/ice application    [] other:      Other Objective/Functional Measures:   KORI Special Tests (pre/post)    SLR    Right: 75             Left: 73  Trunk Rot                   Right: 15    Left 15   Hip ER                       Right: 25            Left: 20  Hip IR                          Right : 15          Left: 11    Pain Level (0-10 scale) post treatment: 0    ASSESSMENT/Changes in Function:     Pt initially presented to therapy C/C of Left sided LBP, gradual and insidious in onset. Pt has been seen in therapy 18x including initial eval. Pt reported 90% improvement in sx's since starting therapy. Pt is able to reach toes and perform lateral and retro walking with out pain. Pt continues to report a \"nagging \" pain in left posterior hip/SI that is interfering with sleep however 90% resolved. Pt continues to present with decreased left hip mobility and tension in left posterior hip capsule as well and decreased glut strength . Although pt is making excellent progress in therapy, would benefit from continued session to address remaining ROM and strength deficits. Patient will continue to benefit from skilled PT services to modify and progress therapeutic interventions, address functional mobility deficits, address ROM deficits, address strength deficits, analyze and address soft tissue restrictions, analyze and cue movement patterns, analyze and modify body mechanics/ergonomics, assess and modify postural abnormalities, address imbalance/dizziness and instruct in home and community integration to attain remaining goals. [x]  See Plan of Care  []  See progress note/recertification  []  See Discharge Summary         Progress towards goals / Updated goals:  Short Term Goals: STG- To be accomplished in 5 treatments(s):  1.  Pt will be compliant with HEP to encourage prophylaxis. Eval:dispensed, to be updated   Last PN compliance per pt report goal MET 10/29/21  Current: Pt reported 80-90% compliance to HEP. however not good compliance over the last week. Also not  walking due to the cold  :prgoressing 12/28/21     Long Term Goals: LTG- To be accomplished in 10 treatment(s):  1.  Pt will improved trunk rotation to 16 in to indicate mobility needed for gait. Eval:Right: 19.5 in       Left: 19 in with pain   Last PN  LTR 16 inches bilaterally   MET 11/23/21     2.  Pt will be able to lift 30-40 pounds with good mechanics and without pain to indicate tolerance to work related activities. Eval:Pain with >20 pounds  Last PN Pt able to  30 # with no pain: MET 11/23/21     3.  Pt will be able to bridge to full height without pain or excessive lordosis to indicate functional glut and hamstring strength for daily activities. Eval:Bridging: decreased height and increased lumbar extension, no pain   Last PN  Pt able to perform 20 bridges with no pain and good height : MET 11/16/21     4.  Pt FOTO score will increase by 12 points to show improvement in function. Eval:58  Last PN assess Next visit   Current     Updated Goals:   1. Pt will be able to walk 2 miles with out pain to allow for safe and pain free return to exercise. LAst PN: unable to walk > 1 mile without pain   Current: Pt was able to walk 1.5 miles , however has not been consistent with walking due to the weather: progressing 12/28/21     2. Pt will be able to lay supine without pain to return to PLOF  Last PN pain in left SI with laying down   Current: Pt reported decreased pain in supine however still noticeable.  Challenged with sleeping   : progressing 12/28/21          PLAN  []  Upgrade activities as tolerated     [x]  Continue plan of care  []  Update interventions per flow sheet       []  Discharge due to:_  []  Other:_      Davon Reed, KAILYN 12/28/2021  8:56 AM    Future Appointments   Date Time Provider [place]   1/5/2022  7:45 AM Remesic, Georgetta Saliva [facility]   1/7/2022  8:00 AM [facility]   1/12/2022  7:45 AM Remesic, Georgetta Saliva [facility]   1/14/2022  8:00 AM Remesic, Georgetta Saliva [facility]   1/19/2022  7:45 AM Remesic, Georgetta Saliva [facility]   1/21/2022  8:00 AM Remesic, Georgetta Saliva [facility]   1/26/2022  7:45 AM Juan R William THE [facility]   1/28/2022  8:00 AM Juan R William THE [facility]

## 2022-01-05 ENCOUNTER — HOSPITAL ENCOUNTER (OUTPATIENT)
Dept: PHYSICAL THERAPY | Age: 72
Discharge: HOME | End: 2022-01-05
Attending: UROLOGY
Payer: MEDICARE

## 2022-01-05 PROCEDURE — 97530 THERAPEUTIC ACTIVITIES: CPT

## 2022-01-05 PROCEDURE — 97140 MANUAL THERAPY 1/> REGIONS: CPT

## 2022-01-05 PROCEDURE — 97112 NEUROMUSCULAR REEDUCATION: CPT

## 2022-01-05 PROCEDURE — 97110 THERAPEUTIC EXERCISES: CPT

## 2022-01-05 NOTE — PROGRESS NOTES
PT DAILY TREATMENT NOTE    Patient Name: Camille Ibarra  Date:2022  : 1950  [x]  Patient  Verified  Payor: Inocente Gomez / Plan: [place] MEDICARE PART A & B / Product Type: Medicare /    In time:7:40  Out time:8:37  Total Treatment Time (min): 57  Total Timed Codes (min): 57  1:1 Treatment Time (MC/BCBS only): 57   Visit #: 23 of 25    Treatment Dx: Other low back pain [M54.59]    SUBJECTIVE  Pain Level (0-10 scale): 0-1  Any medication changes, allergies to medications, adverse drug reactions, diagnosis change, or new procedure performed?: [x] No    [] Yes (see summary sheet for update)  Subjective functional status/changes:   [] No changes reported  \"Stil that nagging pain. I can feel it sometimes when I roll over in bed. When I do the clams on my side though it always goes away. \"    OBJECTIVE      24 min Therapeutic Exercise:  [x] See flow sheet :   Rationale: increase ROM and increase strength to improve the patients ability to perform daily activities with decreased pain and symptom levels    10 min Therapeutic Activity:  [x]  See flow sheet :   Rationale: increase strength, improve coordination and increase proprioception  to improve the patients ability to perform daily activities with decreased pain and symptom levels     15 min Neuromuscular Re-education:  [x]  See flow sheet :   Rationale: increase strength, improve coordination and increase proprioception  to improve the patients ability to perform daily activities with decreased pain and symptom levels    8 min Manual Therapy:  STM to left gluts and piriformis in right s/l   Rationale: decrease pain, increase ROM and increase tissue extensibility to improve the patients ability to perform daily activities with decreased pain and symptom levels  The manual therapy interventions were performed at a separate and distinct time from the therapeutic activities interventions.     With   [] TE   [] TA   [] neuro   [] other: Patient Education: [x] Review HEP    [] Progressed/Changed HEP based on:   [] positioning   [] body mechanics   [] transfers   [] heat/ice application    [] other:      Other Objective/Functional Measures:   FOTO 74   Decreased coordination with SL RDLs with right LE stable    Pain Level (0-10 scale) post treatment: 0    ASSESSMENT/Changes in Function: Pt tolerated session well with reporting no pain post session. Pt reporting starting sports perfomance today. Pt reporting continued pain in posterior left hip however able to decrease with exercises. Challenged with SL RDLs today needing cues to increase hip hinge verus engaging back to bend forward. Patient will continue to benefit from skilled PT services to modify and progress therapeutic interventions, address functional mobility deficits, address ROM deficits, address strength deficits, analyze and cue movement patterns, analyze and modify body mechanics/ergonomics, assess and modify postural abnormalities, address imbalance/dizziness and instruct in home and community integration to attain remaining goals. [x]  See Plan of Care  []  See progress note/recertification  []  See Discharge Summary         Progress towards goals / Updated goals:  Short Term Goals: STG- To be accomplished in 5 treatments(s):  1.  Pt will be compliant with HEP to encourage prophylaxis. Eval:dispensed, to be updated   Last PN compliance per pt report goal MET 10/29/21  Current: Pt reported 80-90% compliance to HEP. however not good compliance over the last week. Also not  walking due to the cold  :prgoressing 12/28/21     Long Term Goals: LTG- To be accomplished in 10 treatment(s):  1.  Pt will improved trunk rotation to 16 in to indicate mobility needed for gait. Eval:Right: 19.5 in       Left: 19 in with pain   Last PN  LTR 16 inches bilaterally   MET 11/23/21     2.  Pt will be able to lift 30-40 pounds with good mechanics and without pain to indicate tolerance to work related activities.   Eval:Pain with >20 pounds  Last PN Pt able to  30 # with no pain: MET 11/23/21     3.  Pt will be able to bridge to full height without pain or excessive lordosis to indicate functional glut and hamstring strength for daily activities. Eval:Bridging: decreased height and increased lumbar extension, no pain   Last PN  Pt able to perform 20 bridges with no pain and good height : MET 11/16/21     4.  Pt FOTO score will increase by 12 points to show improvement in function. Eval:58  Last PN assess Next visit   Current: 74 goal MET 1/5/22     Updated Goals:   1. Pt will be able to walk 2 miles with out pain to allow for safe and pain free return to exercise. LAst PN: unable to walk > 1 mile without pain   Current: Pt was able to walk 1.5 miles , however has not been consistent with walking due to the weather: progressing 12/28/21     2.  Pt will be able to lay supine without pain to return to PLOF  Last PN pain in left SI with laying down   Current: pt reporting pain when rolling when sleeping sometimes still progressing 1/5/22    PLAN  []  Upgrade activities as tolerated     [x]  Continue plan of care  []  Update interventions per flow sheet       []  Discharge due to:_  []  Other:_      Wendy Pena 1/5/2022  7:43 AM    Future Appointments   Date Time Provider [place]   1/5/2022  7:45 AM Remesic, Loel Blunt [facility]   1/7/2022  7:45 AM [place] [facility] [place] [facility]   1/12/2022  7:45 AM Remesic, Loel Blunt [facility]   1/14/2022  7:45 AM Remesic, Loel Blunt [facility]   1/19/2022  7:45 AM Remesic, Loel Blunt [facility]   1/21/2022  7:45 AM Remesic, Loel Blunt [facility]   1/26/2022  7:45 AM Remesic, Loel Blunt [facility]   1/28/2022  7:45 AM Remesic, Loel Blunt [facility]

## 2022-01-07 ENCOUNTER — HOSPITAL ENCOUNTER (OUTPATIENT)
Dept: PHYSICAL THERAPY | Age: 72
Discharge: HOME | End: 2022-01-07
Attending: UROLOGY
Payer: MEDICARE

## 2022-01-07 PROCEDURE — 97110 THERAPEUTIC EXERCISES: CPT

## 2022-01-07 PROCEDURE — 97112 NEUROMUSCULAR REEDUCATION: CPT

## 2022-01-07 PROCEDURE — 97530 THERAPEUTIC ACTIVITIES: CPT

## 2022-01-07 NOTE — PROGRESS NOTES
PT DAILY TREATMENT NOTE    Patient Name: Mercedes De Luna  Date:2022  : 1950  [x]  Patient  Verified  Payor: Nicolle Antonina / Plan: [place] / Product Type: Medicare /    In time:7:44  Out time:8:33  Total Treatment Time (min): 49  Total Timed Codes (min): 49  1:1 Treatment Time (MC/BCBS only): 49   Visit #: 20 of 25    Treatment Dx: Other low back pain [M54.59]    SUBJECTIVE  Pain Level (0-10 scale): 0-1  Any medication changes, allergies to medications, adverse drug reactions, diagnosis change, or new procedure performed?: [x] No    [] Yes (see summary sheet for update)  Subjective functional status/changes:   [] No changes reported  \"sore. \"    OBJECTIVE      24 min Therapeutic Exercise:  [x] See flow sheet :   Rationale: increase ROM and increase strength to improve the patients ability to perform daily activities with decreased pain and symptom levels    10 min Therapeutic Activity:  [x]  See flow sheet :   Rationale: increase strength, improve coordination and increase proprioception  to improve the patients ability to perform daily activities with decreased pain and symptom levels      15 min Neuromuscular Re-education:  [x]  See flow sheet :   Rationale: increase strength, improve coordination, improve balance and increase proprioception  to improve the patients ability to perform daily activities with decreased pain and symptom levels      With   [] TE   [] TA   [] neuro   [] other: Patient Education: [x] Review HEP    [] Progressed/Changed HEP based on:   [] positioning   [] body mechanics   [] transfers   [] heat/ice application    [] other:      Other Objective/Functional Measures:   Decreased left hip IR with bench planks with adduction   Fatigue with right s/l left hip IR  Decreased right knee clearance with glut crossover      Pain Level (0-10 scale) post treatment: 0    ASSESSMENT/Changes in Function: Pt tolerated session well with reporting no pain post session.  Pt reporting soreness in left hip moving more in the back versus hip now. Pt continues to demonstrate decreased glut strength with easily fatiguing with exercises however less cues needed for proper form. Patient will continue to benefit from skilled PT services to modify and progress therapeutic interventions, address functional mobility deficits, address ROM deficits, address strength deficits, analyze and address soft tissue restrictions, analyze and cue movement patterns, analyze and modify body mechanics/ergonomics, assess and modify postural abnormalities and instruct in home and community integration to attain remaining goals. [x]  See Plan of Care  []  See progress note/recertification  []  See Discharge Summary         Progress towards goals / Updated goals:  Short Term Goals: STG- To be accomplished in 5 treatments(s):  1.  Pt will be compliant with HEP to encourage prophylaxis. Eval:dispensed, to be updated   Last PN compliance per pt report goal MET 10/29/21  Current: Pt reported 80-90% compliance to HEP. however not good compliance over the last week. Also not  walking due to the cold  :prgoressing 12/28/21      Long Term Goals: LTG- To be accomplished in 10 treatment(s):  1.  Pt will improved trunk rotation to 16 in to indicate mobility needed for gait. Eval:Right: 19.5 in       Left: 19 in with pain   Last PN  LTR 16 inches bilaterally   MET 11/23/21     2.  Pt will be able to lift 30-40 pounds with good mechanics and without pain to indicate tolerance to work related activities. Eval:Pain with >20 pounds  Last PN Pt able to  30 # with no pain: MET 11/23/21     3.  Pt will be able to bridge to full height without pain or excessive lordosis to indicate functional glut and hamstring strength for daily activities.   Eval:Bridging: decreased height and increased lumbar extension, no pain   Last PN  Pt able to perform 20 bridges with no pain and good height : MET 11/16/21     4.  Pt FOTO score will increase by 12 points to show improvement in function. Eval:58  Last PN assess Next visit   Current: 74 goal MET 1/5/22     Updated Goals:   1. Pt will be able to walk 2 miles with out pain to allow for safe and pain free return to exercise. LAst PN: unable to walk > 1 mile without pain   Current: Pt was able to walk 1.5 miles , however has not been consistent with walking due to the weather: progressing 12/28/21     2.  Pt will be able to lay supine without pain to return to PLOF  Last PN pain in left SI with laying down   Current: pt reporting pain when rolling when sleeping sometimes still progressing 1/5/22       PLAN  []  Upgrade activities as tolerated     [x]  Continue plan of care  []  Update interventions per flow sheet       []  Discharge due to:_  []  Other:_      Dale Irizarry 1/7/2022  7:41 AM    Future Appointments   Date Time Provider Quincy Mari   1/7/2022  7:45 AM Remesic, Deonna Daubs [facility]   1/12/2022  7:45 AM Remesic, Deonna Daubs [facility]   1/14/2022  7:45 AM Remesic, Deonna Daubs [facility]   1/19/2022  7:45 AM Remesic, Deonna Daubs [facility]   1/21/2022  7:45 AM Remesic, Deonna Daubs [facility]   1/26/2022  7:45 AM Remesic, Deonna Daubs [facility]   1/28/2022  7:45 AM Remesic, Deonna Daubs [facility]

## 2022-01-12 ENCOUNTER — HOSPITAL ENCOUNTER (OUTPATIENT)
Dept: PHYSICAL THERAPY | Age: 72
Discharge: HOME | End: 2022-01-12
Attending: UROLOGY
Payer: MEDICARE

## 2022-01-12 PROCEDURE — 97530 THERAPEUTIC ACTIVITIES: CPT

## 2022-01-12 PROCEDURE — 97112 NEUROMUSCULAR REEDUCATION: CPT

## 2022-01-12 PROCEDURE — 97110 THERAPEUTIC EXERCISES: CPT

## 2022-01-12 NOTE — PROGRESS NOTES
[facility] at the [address], [facility], [address] [facility]  Phone: [phone]      Fax:  [phone]    Progress Note  Patient name: Jalen Booth Start of Care: 10/1/21   Referral source: Sotero Oliva MD : 1950   Medical/Treatment Diagnosis: Other low back pain [M54.59] Onset Date:4 weeks ago     Prior Hospitalization: see medical history Provider#: [id number]   Medications: Verified on Patient Summary List    Comorbidities:HTN, Hx of LBP, Previous surgeries, BMI >30   Prior Level of Function: no pain with lifting >40 pounds, walking for exercise and stair negotiation   Visits from Start of Care: 21   Missed Visits: 2    Progress Towards Goals:  Short Term Goals: STG- To be accomplished in 5 treatments(s):  1.  Pt will be compliant with HEP to encourage prophylaxis. Eval:dispensed, to be updated   Last PN compliance per pt report goal MET 10/29/21  Current: Pt reported 80-90% compliance to HEP. however not good compliance over the last week. Also not  walking due to the cold  :prgoressing 21      Long Term Goals: LTG- To be accomplished in 10 treatment(s):  1.  Pt will improved trunk rotation to 16 in to indicate mobility needed for gait. Eval:Right: 19.5 in       Left: 19 in with pain   Last PN  LTR 16 inches bilaterally   MET 21     2.  Pt will be able to lift 30-40 pounds with good mechanics and without pain to indicate tolerance to work related activities. Eval:Pain with >20 pounds  Last PN Pt able to  30 # with no pain: MET 21     3.  Pt will be able to bridge to full height without pain or excessive lordosis to indicate functional glut and hamstring strength for daily activities.   Eval:Bridging: decreased height and increased lumbar extension, no pain   Last PN  Pt able to perform 20 bridges with no pain and good height : MET 21     4.  Pt FOTO score will increase by 12 points to show improvement in function. Eval:58  Last PN assess Next visit   Current: 74 goal MET 1/5/22     Updated Goals:   1. Pt will be able to walk 2 miles with out pain to allow for safe and pain free return to exercise. LAst PN: unable to walk > 1 mile without pain   Current: unable to walk recently due to weather however able to workout with max pain 1/10 after progressing 1/12/22     2. Pt will be able to lay supine without pain to return to PLOF  Last PN pain in left SI with laying down   Current: pt reporting pain when rolling when sleeping sometimes still progressing 1/5/22     Updated goals to be achieved in 6 weeks:  1. Pt bilateral hip IR will improve to > 25* to demonstrate improved hip mobility for squatting and working out  Current: Right: 15*                Left: 20*       Key Functional Changes: Pt initially presented to therapy C/C of Left sided LBP, gradual and insidious in onset. Pt has attended 21 sessions including eval with making great progress towards goals however continued nagging pain 1/10 in left hip with laying/rolling in bed and increased activity. Pt continues to demonstrated decreased glut strength and hip IR/ER AROM bilaterally possibly contributing to continued pain. Pt has not tried walking ling distances yet however has started with sports performance working out this week with reporting 1/10 pain after in left hip. Pt would benefit from contineud skilled PT services to address remaining strength and ROM deficits to allow pt to return to PLOF and workout without left hip pain.      Updated Goals: to be achieved in 6 treatments:   See goals above    ASSESSMENT/RECOMMENDATIONS:  [x]Continue therapy per initial plan/protocol at a frequency of  2 x per week for 3 weeks  []Continue therapy with the following recommended changes:_____________________      _____________________________________________________________________  []Discontinue therapy progressing towards or have reached established goals  []Discontinue therapy due to lack of appreciable progress towards goals  []Discontinue therapy due to lack of attendance or compliance  []Await Physician's recommendations/decisions regarding therapy  []Other:________________________________________________________________    Thank you for this referral.   Becky William 1/12/2022 8:29 AM

## 2022-01-12 NOTE — PROGRESS NOTES
PT DAILY TREATMENT NOTE    Patient Name: Yasmin Ruiz  Date:2022  : 1950  [x]  Patient  Verified  Payor: Lyndsay Marcelo / Plan: [place] MEDICARE PART A & B / Product Type: Medicare /    In time:7:40  Out time:8:30  Total Treatment Time (min): 50  Total Timed Codes (min): 50  1:1 Treatment Time (MC/BCBS only): 50   Visit #: 21 of 25    Treatment Dx: Other low back pain [M54.59]    SUBJECTIVE  Pain Level (0-10 scale): 0-1  Any medication changes, allergies to medications, adverse drug reactions, diagnosis change, or new procedure performed?: [x] No    [] Yes (see summary sheet for update)  Subjective functional status/changes:   [] No changes reported  \"Same. I wasn't;t as sore as I though after the workout. \"    OBJECTIVE      20 min Therapeutic Exercise:  [x] See flow sheet :   Rationale: increase ROM and increase strength to improve the patients ability to perform daily activities with decreased pain and symptom levels    10 min Therapeutic Activity:  [x]  See flow sheet :   Rationale: increase strength, improve coordination and increase proprioception  to improve the patients ability to perform daily activities with decreased pain and symptom levels     20 min Neuromuscular Re-education:  [x]  See flow sheet :   Rationale: increase strength, improve coordination, improve balance and increase proprioception  to improve the patients ability to perform daily activities with decreased pain and symptom levels    With   [] TE   [] TA   [] neuro   [] other: Patient Education: [x] Review HEP    [] Progressed/Changed HEP based on:   [] positioning   [] body mechanics   [] transfers   [] heat/ice application    [] other:      Other Objective/Functional Measures: see goals below   Hip IR  Right: 15*  Left: 20*  Hip ER Right:  32*  Left: 30*    Pain Level (0-10 scale) post treatment: 0    ASSESSMENT/Changes in Function: Pt initially presented to therapy C/C of Left sided LBP, gradual and insidious in onset.  Pt has attended 21 sessions including eval with making great progress towards goals however continued nagging pain 1/10 in left hip with laying/rolling in bed and increased activity. Pt continues to demonstrated decreased glut strength and hip IR/ER AROM bilaterally possibly contributing to continued pain. Pt has not tried walking ling distances yet however has started with sports performance working out this week with reporting 1/10 pain after in left hip. Pt would benefit from contineud skilled PT services to address remaining strength and ROM deficits to allow pt to return to PLOF and workout without left hip pain. Patient will continue to benefit from skilled PT services to modify and progress therapeutic interventions, address functional mobility deficits, address strength deficits, analyze and cue movement patterns, analyze and modify body mechanics/ergonomics, assess and modify postural abnormalities, address imbalance/dizziness and instruct in home and community integration to attain remaining goals. [x]  See Plan of Care  []  See progress note/recertification  []  See Discharge Summary         Progress towards goals / Updated goals:  Short Term Goals: STG- To be accomplished in 5 treatments(s):  1.  Pt will be compliant with HEP to encourage prophylaxis. Eval:dispensed, to be updated   Last PN compliance per pt report goal MET 10/29/21  Current: Pt reported 80-90% compliance to HEP. however not good compliance over the last week. Also not  walking due to the cold  :prgoressing 12/28/21      Long Term Goals: LTG- To be accomplished in 10 treatment(s):  1.  Pt will improved trunk rotation to 16 in to indicate mobility needed for gait. Eval:Right: 19.5 in       Left: 19 in with pain   Last PN  LTR 16 inches bilaterally   MET 11/23/21     2.  Pt will be able to lift 30-40 pounds with good mechanics and without pain to indicate tolerance to work related activities.   Eval:Pain with >20 pounds  Last PN Pt able to  30 # with no pain: MET 11/23/21     3.  Pt will be able to bridge to full height without pain or excessive lordosis to indicate functional glut and hamstring strength for daily activities. Eval:Bridging: decreased height and increased lumbar extension, no pain   Last PN  Pt able to perform 20 bridges with no pain and good height : MET 11/16/21     4.  Pt FOTO score will increase by 12 points to show improvement in function. Eval:58  Last PN assess Next visit   Current: 74 goal MET 1/5/22     Updated Goals:   1. Pt will be able to walk 2 miles with out pain to allow for safe and pain free return to exercise. LAst PN: unable to walk > 1 mile without pain   Current: unable to walk recently due to weather however able to workout with max pain 1/10 after progressing 1/12/22     2. Pt will be able to lay supine without pain to return to PLOF  Last PN pain in left SI with laying down   Current: pt reporting pain when rolling when sleeping sometimes still progressing 1/5/22    Updated goals to be achieved in 6 weeks:  1.  Pt bilateral hip IR will improve to > 25* to demonstrate improved hip mobility for squatting and working out  Current: Right: 15*  Left: 20*      PLAN  []  Upgrade activities as tolerated     [x]  Continue plan of care  []  Update interventions per flow sheet       []  Discharge due to:_  []  Other:_      Dennie Molt 1/12/2022  7:45 AM    Future Appointments   Date Time Provider Quincy Mari   1/14/2022  7:45 AM Liat William [facility]   1/19/2022  7:45 AM Liat William [facility]   1/21/2022  7:45 AM Liat William [facility]   1/26/2022  7:45 AM Liat William [facility]   1/28/2022  7:45 AM Liat William [facility]

## 2022-01-14 ENCOUNTER — HOSPITAL ENCOUNTER (OUTPATIENT)
Dept: PHYSICAL THERAPY | Age: 72
Discharge: HOME | End: 2022-01-14
Attending: UROLOGY
Payer: MEDICARE

## 2022-01-14 PROCEDURE — 97530 THERAPEUTIC ACTIVITIES: CPT

## 2022-01-14 PROCEDURE — 97112 NEUROMUSCULAR REEDUCATION: CPT

## 2022-01-14 PROCEDURE — 97110 THERAPEUTIC EXERCISES: CPT

## 2022-01-14 NOTE — PROGRESS NOTES
PT DAILY TREATMENT NOTE    Patient Name: Floyd Davidson  Date:2022  : 1950  [x]  Patient  Verified  Payor: Jules Beets / Plan: [place] MEDICARE PART A & B / Product Type: Medicare /    In time:7:40  Out time:8:29  Total Treatment Time (min): 49  Total Timed Codes (min): 49  1:1 Treatment Time (MC/BCBS only): 49   Visit #: 22 of 27    Treatment Dx: Other low back pain [M54.59]    SUBJECTIVE  Pain Level (0-10 scale): 0  Any medication changes, allergies to medications, adverse drug reactions, diagnosis change, or new procedure performed?: [x] No    [] Yes (see summary sheet for update)  Subjective functional status/changes:   [] No changes reported  \"Good. Don't feel anything this morning. \"    OBJECTIVE    24 min Therapeutic Exercise:  [x] See flow sheet :   Rationale: increase ROM and increase strength to improve the patients ability to perform daily activities with decreased pain and symptom levels    10 min Therapeutic Activity:  [x]  See flow sheet :   Rationale: increase strength, improve coordination and increase proprioception  to improve the patients ability to perform daily activities with decreased pain and symptom levels     15 min Neuromuscular Re-education:  [x]  See flow sheet :   Rationale: increase strength, improve coordination and increase proprioception  to improve the patients ability to perform daily activities with decreased pain and symptom levels       With   [] TE   [] TA   [] neuro   [] other: Patient Education: [x] Review HEP    [] Progressed/Changed HEP based on:   [] positioning   [] body mechanics   [] transfers   [] heat/ice application    [] other:      Other Objective/Functional Measures:   Glut fatigue with runners glut  Decreased coordination with retro step with reach  Cues to decrease foot ER with standing activities      Pain Level (0-10 scale) post treatment: 0    ASSESSMENT/Changes in Function: Pt tolerated session well with reporting no pain post session.  Pt challenged with decreasing bilateral foot ER left > right with standing activities needing consistent verbal cues. Continued decreased bilateral glut strength as well with easily fatiguing with exercises however no pain. Patient will continue to benefit from skilled PT services to modify and progress therapeutic interventions, address functional mobility deficits, address ROM deficits, address strength deficits, analyze and cue movement patterns, analyze and modify body mechanics/ergonomics, assess and modify postural abnormalities and instruct in home and community integration to attain remaining goals. [x]  See Plan of Care  []  See progress note/recertification  []  See Discharge Summary         Progress towards goals / Updated goals:  Short Term Goals: STG- To be accomplished in 5 treatments(s):  1.  Pt will be compliant with HEP to encourage prophylaxis. Eval:dispensed, to be updated   Last PN  Pt reported 80-90% compliance to HEP. however not good compliance over the last week. Also not  walking due to the cold  :prgoressing 12/28/21   Current:      Long Term Goals: LTG- To be accomplished in 10 treatment(s):  1.  Pt will improved trunk rotation to 16 in to indicate mobility needed for gait. Eval:Right: 19.5 in       Left: 19 in with pain   Last PN  LTR 16 inches bilaterally   MET 11/23/21     2.  Pt will be able to lift 30-40 pounds with good mechanics and without pain to indicate tolerance to work related activities. Eval:Pain with >20 pounds  Last PN Pt able to  30 # with no pain: MET 11/23/21     3.  Pt will be able to bridge to full height without pain or excessive lordosis to indicate functional glut and hamstring strength for daily activities.   Eval:Bridging: decreased height and increased lumbar extension, no pain   Last PN  Pt able to perform 20 bridges with no pain and good height : MET 11/16/21     4.  Pt FOTO score will increase by 12 points to show improvement in function. Eval:58  Last PN  74 goal MET 1/5/22     Updated Goals:   1. Pt will be able to walk 2 miles with out pain to allow for safe and pain free return to exercise. LAst PN: unable to walk recently due to weather however able to workout with max pain 1/10 after progressing 1/12/22  Current:      2. Pt will be able to lay supine without pain to return to PLOF  Last PN: pt reporting pain when rolling when sleeping sometimes still progressing 1/5/22  Current:      Updated goals to be achieved in 6 weeks:  1.  Pt bilateral hip IR will improve to > 25* to demonstrate improved hip mobility for squatting and working out  Last PN Right: 15*                Left: 20*   Current:        PLAN  []  Upgrade activities as tolerated     [x]  Continue plan of care  []  Update interventions per flow sheet       []  Discharge due to:_  []  Other:_      Ca Snow 1/14/2022  7:42 AM    Future Appointments   Date Time Provider Quincy Mari   1/14/2022  7:45 AM Remesic, [employer] [facility]   1/19/2022  7:45 AM Remesic, [employer] [facility]   1/21/2022  7:45 AM Remesic, [employer] [facility]   1/26/2022  7:45 AM Remesic, [employer] [facility]   1/28/2022  7:45 AM Remesic, [employer] [facility]

## 2022-01-19 ENCOUNTER — APPOINTMENT (OUTPATIENT)
Dept: PHYSICAL THERAPY | Age: 72
End: 2022-01-19
Attending: UROLOGY
Payer: MEDICARE

## 2022-01-21 ENCOUNTER — APPOINTMENT (OUTPATIENT)
Dept: PHYSICAL THERAPY | Age: 72
End: 2022-01-21
Attending: UROLOGY
Payer: MEDICARE

## 2022-01-26 ENCOUNTER — HOSPITAL ENCOUNTER (OUTPATIENT)
Dept: PHYSICAL THERAPY | Age: 72
Discharge: HOME | End: 2022-01-26
Attending: UROLOGY
Payer: MEDICARE

## 2022-01-26 PROCEDURE — 97530 THERAPEUTIC ACTIVITIES: CPT

## 2022-01-26 PROCEDURE — 97112 NEUROMUSCULAR REEDUCATION: CPT

## 2022-01-26 PROCEDURE — 97110 THERAPEUTIC EXERCISES: CPT

## 2022-01-26 NOTE — PROGRESS NOTES
PT DAILY TREATMENT NOTE    Patient Name: Jalen Booth  Date:2022  : 1950  [x]  Patient  Verified  Payor: Bambi Diane / Plan: [place] & B / Product Type: Medicare /    In time:7:35  Out time:8:29  Total Treatment Time (min): 54  Total Timed Codes (min): 54  1:1 Treatment Time (MC/BCBS only): 54   Visit #: 23 of 27    Treatment Dx: Other low back pain [M54.59]    SUBJECTIVE  Pain Level (0-10 scale): 0  Any medication changes, allergies to medications, adverse drug reactions, diagnosis change, or new procedure performed?: [x] No    [] Yes (see summary sheet for update)  Subjective functional status/changes:   [] No changes reported  \"good. \"    OBJECTIVE      29 min Therapeutic Exercise:  [x] See flow sheet :   Rationale: increase ROM and increase strength to improve the patients ability to perform daily activities with decreased pain and symptom levels    10 min Therapeutic Activity:  [x]  See flow sheet :   Rationale: increase strength, improve coordination and increase proprioception  to improve the patients ability to perform daily activities with decreased pain and symptom levels     15 min Neuromuscular Re-education:  [x]  See flow sheet :   Rationale: increase strength, improve coordination, improve balance and increase proprioception  to improve the patients ability to perform daily activities with decreased pain and symptom levels      With   [] TE   [] TA   [] neuro   [] other: Patient Education: [x] Review HEP    [] Progressed/Changed HEP based on:   [] positioning   [] body mechanics   [] transfers   [] heat/ice application    [] other:      Other Objective/Functional Measures:   Hip IR ROM:  right 24*  Left 20*      Pain Level (0-10 scale) post treatment: 0    ASSESSMENT/Changes in Function: Pt tolerated session well with reporting no pain post session. Pt continues to demonstrate decreased bilateral hip IR ROM right > left however improving since last PN.  Decreased glut strength noted as well with unable to clear table with SL bridge and easily fatiguing with exercises. Patient will continue to benefit from skilled PT services to modify and progress therapeutic interventions, address functional mobility deficits, address ROM deficits, address strength deficits, analyze and cue movement patterns, analyze and modify body mechanics/ergonomics, assess and modify postural abnormalities, address imbalance/dizziness and instruct in home and community integration to attain remaining goals. [x]  See Plan of Care  []  See progress note/recertification  []  See Discharge Summary         Progress towards goals / Updated goals:  Short Term Goals: STG- To be accomplished in 5 treatments(s):  1.  Pt will be compliant with HEP to encourage prophylaxis. Eval:dispensed, to be updated   Last PN  Pt reported 80-90% compliance to HEP. however not good compliance over the last week. Also not  walking due to the cold  :prgoressing 12/28/21   Current:      Long Term Goals: LTG- To be accomplished in 10 treatment(s):  1.  Pt will improved trunk rotation to 16 in to indicate mobility needed for gait. Eval:Right: 19.5 in       Left: 19 in with pain   Last PN  LTR 16 inches bilaterally   MET 11/23/21     2.  Pt will be able to lift 30-40 pounds with good mechanics and without pain to indicate tolerance to work related activities. Eval:Pain with >20 pounds  Last PN Pt able to  30 # with no pain: MET 11/23/21     3.  Pt will be able to bridge to full height without pain or excessive lordosis to indicate functional glut and hamstring strength for daily activities. Eval:Bridging: decreased height and increased lumbar extension, no pain   Last PN  Pt able to perform 20 bridges with no pain and good height : MET 11/16/21     4.  Pt FOTO score will increase by 12 points to show improvement in function. Eval:58  Last PN  74 goal MET 1/5/22     Updated Goals:   1.  Pt will be able to walk 2 miles with out pain to allow for safe and pain free return to exercise. LAst PN: unable to walk recently due to weather however able to workout with max pain 1/10 after progressing 1/12/22  Current:      2. Pt will be able to lay supine without pain to return to PLOF  Last PN: pt reporting pain when rolling when sleeping sometimes still progressing 1/5/22  Current:      Updated goals to be achieved in 6 weeks:  1.  Pt bilateral hip IR will improve to > 25* to demonstrate improved hip mobility for squatting and working out  Last PN Right: 15*                Left: 20*   Current: right 24*  Left 20* progressing 1/26/22       PLAN  []  Upgrade activities as tolerated     [x]  Continue plan of care  []  Update interventions per flow sheet       []  Discharge due to:_  []  Other:_      Gladys Huddleston 1/26/2022  7:37 AM    Future Appointments   Date Time Provider [place]   1/26/2022  7:45 AM Remesic, Kieran Habermann [facility]   1/28/2022  7:45 AM Remesic, Kieran Habermann [facility]

## 2022-01-28 ENCOUNTER — APPOINTMENT (OUTPATIENT)
Dept: PHYSICAL THERAPY | Age: 72
End: 2022-01-28
Attending: UROLOGY
Payer: MEDICARE

## 2022-03-07 ENCOUNTER — APPOINTMENT (OUTPATIENT)
Dept: PHYSICAL THERAPY | Age: 72
End: 2022-03-07
Attending: UROLOGY

## 2022-03-09 ENCOUNTER — APPOINTMENT (OUTPATIENT)
Dept: PHYSICAL THERAPY | Age: 72
End: 2022-03-09
Attending: UROLOGY

## 2022-03-15 ENCOUNTER — APPOINTMENT (OUTPATIENT)
Dept: PHYSICAL THERAPY | Age: 72
End: 2022-03-15
Attending: UROLOGY

## 2022-03-21 NOTE — PROGRESS NOTES
[facility] at the [address], [place], [address] [facility]  Phone: [phone]      Fax:  [phone]            Discharge Summary    Patient name: Mary Lou Chin     Start of Care: 10/1/21  Referral source: Babar Agrawal MD    : 1950  Medical/Treatment Diagnosis: Other low back pain [M54.59]  Onset Date:4 weeks ago  Prior Hospitalization: see medical history   Provider#: [id number]  Comorbidities:HTN, Hx of LBP, Previous surgeries, BMI >30   Prior Level of Function: no pain with lifting >40 pounds, walking for exercise and stair negotiation   Medications: Verified on Patient Summary List    Visits from Start of Care: 23    Missed Visits: 2    Reporting Period : 10/1/21 to 22    Goals/Measure of Progress:  Short Term Goals: STG- To be accomplished in 5 treatments(s):  1.  Pt will be compliant with HEP to encourage prophylaxis. Eval:dispensed, to be updated   Last PN  Pt reported 80-90% compliance to HEP. however not good compliance over the last week. Also not  walking due to the cold  :prgoressing 21   Current: NA since last PN     Long Term Goals: LTG- To be accomplished in 10 treatment(s):  1.  Pt will improved trunk rotation to 16 in to indicate mobility needed for gait. Eval:Right: 19.5 in       Left: 19 in with pain   Last PN  LTR 16 inches bilaterally   MET 21     2.  Pt will be able to lift 30-40 pounds with good mechanics and without pain to indicate tolerance to work related activities. Eval:Pain with >20 pounds  Last PN Pt able to  30 # with no pain: MET 21     3.  Pt will be able to bridge to full height without pain or excessive lordosis to indicate functional glut and hamstring strength for daily activities.   Eval:Bridging: decreased height and increased lumbar extension, no pain   Last PN  Pt able to perform 20 bridges with no pain and good height : MET 21     4.  Pt FOTO score will increase by 12 points to show improvement in function. Eval:58  Last PN  74 goal MET 1/5/22     Updated Goals:   1. Pt will be able to walk 2 miles with out pain to allow for safe and pain free return to exercise. LAst PN: unable to walk recently due to weather however able to workout with max pain 1/10 after progressing 1/12/22  Current: Na since last PN however no pain during last visit progressing 1/26/22     2. Pt will be able to lay supine without pain to return to PLOF  Last PN: pt reporting pain when rolling when sleeping sometimes still progressing 1/5/22  Current: NA since last PN     Updated goals to be achieved in 6 weeks:  1. Pt bilateral hip IR will improve to > 25* to demonstrate improved hip mobility for squatting and working out  Last PN Right: 15*                Left: 20*   Current: right 24*         Left 20* progressing 1/26/22      Assessment/ Summary of Care: Pt initially presented to therapy C/C of Left sided LBP, gradual and insidious in onset. Pt has attended 23 session including initial eval with making good progress towards goals with improvements in ROM, strength and overall mobility however pt still reporting 1/10 max pain in left hip at times with laying down at night and walking. Pt is ready to be DC at this time due to progress and busy schedule.      RECOMMENDATIONS:  [x]Discontinue therapy: [x]Patient has reached or is progressing toward set goals      []Patient is non-compliant or has abdicated      []Due to lack of appreciable progress towards set goals    Mark William 3/21/2022 2:07 PM

## 2022-04-14 ENCOUNTER — HOSPITAL ENCOUNTER (OUTPATIENT)
Dept: PREADMISSION TESTING | Age: 72
Discharge: HOME | End: 2022-04-14
Payer: MEDICARE

## 2022-04-14 ENCOUNTER — TRANSCRIBE ORDER (OUTPATIENT)
Dept: REGISTRATION | Age: 72
End: 2022-04-14

## 2022-04-14 ENCOUNTER — HOSPITAL ENCOUNTER (OUTPATIENT)
Dept: GENERAL RADIOLOGY | Age: 72
Discharge: HOME | End: 2022-04-14
Payer: MEDICARE

## 2022-04-14 DIAGNOSIS — M17.12 OSTEOARTHRITIS OF LEFT KNEE: Primary | ICD-10-CM

## 2022-04-14 DIAGNOSIS — M17.12 OSTEOARTHRITIS OF LEFT KNEE: ICD-10-CM

## 2022-04-14 LAB
ALBUMIN SERPL-MCNC: 3.8 G/DL (ref 3.4–5)
ALBUMIN/GLOB SERPL: 1.1 {RATIO} (ref 0.8–1.7)
ALP SERPL-CCNC: 71 U/L (ref 45–117)
ALT SERPL-CCNC: 21 U/L (ref 16–61)
ANION GAP SERPL CALC-SCNC: 4 MMOL/L (ref 3–18)
APPEARANCE UR: CLEAR
APTT PPP: 32.1 SEC (ref 23–36.4)
AST SERPL-CCNC: 22 U/L (ref 10–38)
BASOPHILS # BLD: 0 K/UL (ref 0–0.1)
BASOPHILS NFR BLD: 1 % (ref 0–2)
BILIRUB SERPL-MCNC: 0.5 MG/DL (ref 0.2–1)
BILIRUB UR QL: NEGATIVE
BUN SERPL-MCNC: 20 MG/DL (ref 7–18)
BUN/CREAT SERPL: 17 (ref 12–20)
CALCIUM SERPL-MCNC: 9.1 MG/DL (ref 8.5–10.1)
CHLORIDE SERPL-SCNC: 105 MMOL/L (ref 100–111)
CO2 SERPL-SCNC: 31 MMOL/L (ref 21–32)
COLOR UR: YELLOW
CREAT SERPL-MCNC: 1.16 MG/DL (ref 0.6–1.3)
DIFFERENTIAL METHOD BLD: ABNORMAL
EOSINOPHIL # BLD: 0.1 K/UL (ref 0–0.4)
EOSINOPHIL NFR BLD: 2 % (ref 0–5)
ERYTHROCYTE [DISTWIDTH] IN BLOOD BY AUTOMATED COUNT: 13.9 % (ref 11.6–14.5)
EST. AVERAGE GLUCOSE BLD GHB EST-MCNC: 117 MG/DL
GLOBULIN SER CALC-MCNC: 3.6 G/DL (ref 2–4)
GLUCOSE SERPL-MCNC: 94 MG/DL (ref 74–99)
GLUCOSE UR STRIP.AUTO-MCNC: NEGATIVE MG/DL
HBA1C MFR BLD: 5.7 % (ref 4.2–5.6)
HCT VFR BLD AUTO: 37.2 % (ref 36–48)
HGB BLD-MCNC: 12 G/DL (ref 13–16)
HGB UR QL STRIP: NEGATIVE
IMM GRANULOCYTES # BLD AUTO: 0 K/UL (ref 0–0.04)
IMM GRANULOCYTES NFR BLD AUTO: 0 % (ref 0–0.5)
INR PPP: 1.1 (ref 0.8–1.2)
KETONES UR QL STRIP.AUTO: NEGATIVE MG/DL
LEUKOCYTE ESTERASE UR QL STRIP.AUTO: NEGATIVE
LYMPHOCYTES # BLD: 1.5 K/UL (ref 0.9–3.6)
LYMPHOCYTES NFR BLD: 34 % (ref 21–52)
MCH RBC QN AUTO: 27.4 PG (ref 24–34)
MCHC RBC AUTO-ENTMCNC: 32.3 G/DL (ref 31–37)
MCV RBC AUTO: 84.9 FL (ref 78–100)
MONOCYTES # BLD: 0.4 K/UL (ref 0.05–1.2)
MONOCYTES NFR BLD: 10 % (ref 3–10)
NEUTS SEG # BLD: 2.3 K/UL (ref 1.8–8)
NEUTS SEG NFR BLD: 53 % (ref 40–73)
NITRITE UR QL STRIP.AUTO: NEGATIVE
NRBC # BLD: 0 K/UL (ref 0–0.01)
NRBC BLD-RTO: 0 PER 100 WBC
PH UR STRIP: 6 [PH] (ref 5–8)
PLATELET # BLD AUTO: 176 K/UL (ref 135–420)
PMV BLD AUTO: 9.8 FL (ref 9.2–11.8)
POTASSIUM SERPL-SCNC: 3.6 MMOL/L (ref 3.5–5.5)
PROT SERPL-MCNC: 7.4 G/DL (ref 6.4–8.2)
PROT UR STRIP-MCNC: NEGATIVE MG/DL
PROTHROMBIN TIME: 13.8 SEC (ref 11.5–15.2)
RBC # BLD AUTO: 4.38 M/UL (ref 4.35–5.65)
SODIUM SERPL-SCNC: 140 MMOL/L (ref 136–145)
SP GR UR REFRACTOMETRY: 1.01 (ref 1–1.03)
UROBILINOGEN UR QL STRIP.AUTO: 0.2 EU/DL (ref 0.2–1)
WBC # BLD AUTO: 4.4 K/UL (ref 4.6–13.2)

## 2022-04-14 PROCEDURE — 85730 THROMBOPLASTIN TIME PARTIAL: CPT

## 2022-04-14 PROCEDURE — 71045 X-RAY EXAM CHEST 1 VIEW: CPT

## 2022-04-14 PROCEDURE — 87086 URINE CULTURE/COLONY COUNT: CPT

## 2022-04-14 PROCEDURE — 83036 HEMOGLOBIN GLYCOSYLATED A1C: CPT

## 2022-04-14 PROCEDURE — 81003 URINALYSIS AUTO W/O SCOPE: CPT

## 2022-04-14 PROCEDURE — 93005 ELECTROCARDIOGRAM TRACING: CPT

## 2022-04-14 PROCEDURE — 85025 COMPLETE CBC W/AUTO DIFF WBC: CPT

## 2022-04-14 PROCEDURE — 36415 COLL VENOUS BLD VENIPUNCTURE: CPT

## 2022-04-14 PROCEDURE — 80053 COMPREHEN METABOLIC PANEL: CPT

## 2022-04-14 PROCEDURE — 85610 PROTHROMBIN TIME: CPT

## 2022-04-15 LAB
ATRIAL RATE: 68 BPM
BACTERIA SPEC CULT: NORMAL
CALCULATED P AXIS, ECG09: 43 DEGREES
CALCULATED R AXIS, ECG10: 13 DEGREES
CALCULATED T AXIS, ECG11: 6 DEGREES
DIAGNOSIS, 93000: NORMAL
P-R INTERVAL, ECG05: 162 MS
Q-T INTERVAL, ECG07: 376 MS
QRS DURATION, ECG06: 96 MS
QTC CALCULATION (BEZET), ECG08: 399 MS
SERVICE CMNT-IMP: NORMAL
SERVICE CMNT-IMP: NORMAL
VENTRICULAR RATE, ECG03: 68 BPM

## 2022-04-22 ENCOUNTER — HOSPITAL ENCOUNTER (OUTPATIENT)
Dept: PREADMISSION TESTING | Age: 72
Discharge: HOME | End: 2022-04-22

## 2022-04-22 VITALS — HEIGHT: 72 IN | WEIGHT: 230 LBS | BODY MASS INDEX: 31.15 KG/M2

## 2022-04-22 RX ORDER — SODIUM CHLORIDE, SODIUM LACTATE, POTASSIUM CHLORIDE, CALCIUM CHLORIDE 600; 310; 30; 20 MG/100ML; MG/100ML; MG/100ML; MG/100ML
125 INJECTION, SOLUTION INTRAVENOUS CONTINUOUS
Status: CANCELLED | OUTPATIENT
Start: 2022-05-04

## 2022-04-22 RX ORDER — TRANEXAMIC ACID 10 MG/ML
1 INJECTION, SOLUTION INTRAVENOUS ONCE
Status: CANCELLED | OUTPATIENT
Start: 2022-05-04 | End: 2022-05-04

## 2022-04-22 RX ORDER — MINERAL OIL
180 ENEMA (ML) RECTAL DAILY
COMMUNITY

## 2022-04-22 NOTE — PERIOP NOTES
Patient advised to wear mask when entering any of the buildings. Being fully vaccinated, they will no longer need to be tested or quarantine prior to procedure. Patient does not meet criteria for special pop. Has sleep apnea and instructed to bring cpap dos. Denies hx of MH. PCP is aware of surgery. G skin care kit given and process reviewed. Not participating in any research study or clinical trials. Patient instructed when coming in for surgery, do not use any lotions, creams or deodorant. Also to remove all jewelry. Instructed to wear something loose fitting and comfortable, easy to take off, easy to put on. Does not have a Do Not Resuscitate order or medical directive. Preoperative Nutrition Screen (CANDE)   Patient's Age: 66 y.o. Patient's BMI: Estimated body mass index is 31.19 kg/m² as calculated from the following:    Height as of this encounter: 6' (1.829 m). Weight as of this encounter: 104.3 kg (230 lb). 1. Does the patient have a documented serum albumin less than 3.0 within the last 90 days? No = 0          2. Is patient's BMI less than 18.5 (or less than 20 if age over 72)? No = 0        3. Has the patient had an unplanned weight loss of 10% of body weight or more in the last 6 months? No = 0   4. Has the patient been eating less than 50% of their normal diet in the preceding week?  No = 0   CANDE Score (number of Yes responses), 0-4 0       Electronically signed by Rivas Garcia RN on 4/22/22 at 7:42 AM

## 2022-04-26 ENCOUNTER — HOSPITAL ENCOUNTER (OUTPATIENT)
Dept: PHYSICAL THERAPY | Age: 72
Discharge: HOME | End: 2022-04-26
Payer: MEDICARE

## 2022-04-26 PROCEDURE — 97110 THERAPEUTIC EXERCISES: CPT

## 2022-04-26 PROCEDURE — 97161 PT EVAL LOW COMPLEX 20 MIN: CPT

## 2022-04-26 NOTE — PROGRESS NOTES
[facility] at THE [address] Darin Pereira, [address] [facility]  Ph [phone]  Fx [phone]    Plan of Care/ Statement of Necessity for Physical Therapy Services    Patient name: Ramin Lunsford Start of Care: 2022   Referral source: Libby Nguyen MD : 1950    Medical Diagnosis: Left knee pain [M25.562]   Onset Date:2022    Treatment Diagnosis: left knee pain with planned left TKR on 22                                              ICD-10: Right knee pain [M25.561]   Prior Hospitalization: see medical history Provider#: [id number]   Medications: Verified on Patient summary List    Comorbidities: s/p right TKR in , cataract surgery, facial cyst removal, prostate issues (no cancer), hypertension, high cholesterol, sleep apnea  Substance Use: []? tobacco use, []? alcohol use, []? other:   Prior Level of Function:    [x]? Unrestricted with functional activities and ADLs  [x]? No assistive device  [x]? active lifestyle, []? moderately active lifestyle, []? sedentary lifestyle  Patients Goals:  \"to get back to everything I was doing before, without pain\"      The Plan of Care and following information is based on the information from the initial evaluation. Assessment/ key information: Patient is a very pleasant 66 y.o. male presenting to skilled PT for a pre-op visit prior to his scheduled left TKR on 22 with Dr. Paulo Yin. Patient presents today with limitations in left knee extension, which is also noted during his abnormal gait mechanics. Strength is WFL at this time. Patient was extensively educated today regarding pre/post op expectations as far as PT, edema reduction techniques, and also on exercises he may start day of surgery. Patients questions were answered to his content.   Will plan to reassess patient s/p surgery.      Patient would benefit from skilled PT services to modify and progress therapeutic interventions, address functional mobility deficits, address ROM deficits, address strength deficits, analyze and address soft tissue restrictions, analyze and cue movement patterns, analyze and modify body mechanics/ergonomics, and assess and modify postural abnormalities to attain remaining goals. Patient did well with today's evaluation. Patient was educated in [facility] and all questions were answered.       Evaluation Complexity History MEDIUM  Complexity : 1-2 comorbidities / personal factors will impact the outcome/ POC ; Examination LOW Complexity : 1-2 Standardized tests and measures addressing body structure, function, activity limitation and / or participation in recreation  ;Presentation LOW Complexity : Stable, uncomplicated  ;Clinical Decision Making MEDIUM Complexity : FOTO score of 26-74 : FOTO score = an established functional score where 100 = no disability  Overall Complexity Rating: LOW     Problem List: pain affecting function, decrease ROM, decrease strength, edema affecting function, impaired gait/ balance, decrease ADL/ functional abilitiies, decrease activity tolerance, decrease flexibility/ joint mobility and decrease transfer abilities   Treatment Plan may include any combination of the following: Therapeutic exercise, Therapeutic activities, Neuromuscular re-education, Physical agent/modality, Gait/balance training, Manual therapy, Aquatic therapy, Patient education, Self Care training, Functional mobility training, Home safety training and Stair training  Vasopnuematic compression justification:  Per bilateral girth measures taken and listed above the edema is considered significant and having an impact on the patient's strength, balance, gait, transfers, self care and ADLs  Patient / Family readiness to learn indicated by: asking questions, trying to perform skills and interest  Persons(s) to be included in education: patient  Barriers to Learning/Limitations: none  Measures taken if barriers to learning: n/a  Patient Self Reported Health Status: good  Rehabilitation Potential: good    Goals:  Short Term Goals:    to be accomplished within 4 weeks:     Patient will report compliance with prescribed HEP(s) at least 1x/day in order to assist in maximizing therapeutic gains, reduce symptoms, and to progress towards overall prior function. Pre-Op: HEP issued and reviewed with patient, patient also educated in edema reduction techniques  Current: same as eval     Patient will demonstrate at least 5-100 deg of left knee AROM post-operatively in order to return to prior functional activities and mobility. Pre-Op AROM: 8-125 deg  Current: same as eval        Long Term Goals:     to be accomplished within 9 weeks:     Patient will score at least 64 points on FOTO in order to maximize function and promote patient satisfaction with overall outcome. (Deanna Constant is an established functional score where 100 = no disability)  Pre-Op FOTO: 52  Current: same as eval     Patient will report less than 2/10 pain during all functional activities and ADLs in order to improve QOL and return to patient's PLOF. Post-Op Reassess: to be completed s/p surgery  Current: same as eval     Patient will demonstrate functional and painfree AROM of at least 0-120 deg in order to return to prior functional activities and mobility. Pre-Op AROM: 8-125 deg  Current: same as eval     Patient will demonstrate at least 5/5 strength bilaterally in order to be able to safely perform functional activities and demonstrate improved stability and strength. Pre-Op Strength: Grossly 5/5 bilaterally except bilateral hip flexion 4+/5  Current: same as eval     Patient will be able to safely ambulate community distances without and normal gait mechanics in order to improve overall mobility and return patient to his or her PLOF.   Eval: mildly antalgic, lacking bilateral TKE prior to heel strike (pre-op)  Current: same as eval     Patient will be able to safely negotiate a full flight of stairs with a step-through pattern while holding onto at least one handrail in order to return to normal with this functional activity and improve safety on stairs. Eval: FF step-through pattern (pre-op)  Current: same as eval    Frequency / Duration: Patient to be seen 3x/week x 4 weeks then 2x/week x 4 weeks. Patient/ Caregiver education and instruction: Diagnosis, prognosis, self care and exercises   [x]  Plan of care has been reviewed with PTA    Certification Period: 2022 - 22    Thank you for the referral to [facility]. Valentine Will, PT, DPT, ATR     2022     8:47 AM    Patient Name: Ramin Lunsford Patient : 1950    ________________________________________________________________________    I certify that the above Therapy Services are being furnished while the patient is under my care. I agree with the treatment plan and certify that this therapy is necessary.     Physician's Signature:_____________________Date:____________TIME:________                                      Libby Nguyen MD      ** Signature, Date and Time must be completed for valid certification **  Please sign and return to [facility] at THE [facility]  [address] [facility] [facility] [address] [place], [address] [facility]  Ph [phone]  Fx [phone]

## 2022-04-26 NOTE — PROGRESS NOTES
PHYSICAL THERAPY EVALUATION / DAILY TREATMENT      Patient Name: Scottie Chakraborty  Date: 2022  : 1950  [x] Patient  Verified  Payor: Ronald Juárez / Plan: [place] & B / Product Type: Medicare /    In Time: 3:33  Out Time: 4:12  Total Treatment Time (min): 39  Visit #: 1    Medicare / Dread [place] Only   Total Timed Codes (min):  25 1:1 Treatment Time:  39     Treatment Area: Left knee pain [M25.562]    SUBJECTIVE:  Chief Complaint/Mechanism of Injury:   Patient is a very pleasant 66 y.o. male with c/o left knee pain. Patient states he is scheduled to have a left knee TKR on 22 with Dr. Niko Mcgee, so he is here for his pre-operative visit. Patient reports a history of 3 months of left knee pain that very quickly worsened. Patient denies having had any injections or prior physical therapy for his left knee. Currently, patient c/o increased pain during walking, sit <> stands, mowing the lawn, going up and down stairs. Patient also reports minor issues with his balance, stating \"I'm very careful to not fall. \"  Patient denies having any falls over the last year, but states he stumbles often. Patient mentions he had his contralateral (right) knee replaced around . He does report some minor pains in his right knee, but states he had no problems achieving good function and range post-op. Patient works for [employer] as building , which includes frequent standing, walking, and going up/down stairs. His hobbies include walking 3 miles 4 times/week as well as house/yard work. Of note, patient states he hasn't been able to fully extend his left knee over the past few months.     Pain Level (out of 0-10 scale): pain ranges from 0-3; currently 1  [x] constant, [] intermittent, [] improving, [] worsening, [] no change since onset  Alleviating Factors: none  Previous Treatment for Current Injury: none  Diagnostic Imaging for Current Injury: x-rays - OA of left knee  Living Situation: lives with wife in a 2 story home with 3 steps to enter, [x] with handrail, with full flight of stairs to go to 2nd floor  Hand Dominance: [x] right handed, [] left handed, [] ambidextrous    Comorbidities: s/p right TKR in 2014, cataract surgery, facial cyst removal, prostate issues (no cancer), hypertension, high cholesterol, sleep apnea  Substance Use: [] tobacco use, [] alcohol use, [] other:   Prior Level of Function:    [x] Unrestricted with functional activities and ADLs  [x] No assistive device  [x] active lifestyle, [] moderately active lifestyle, [] sedentary lifestyle  Patients Goals:  \"to get back to everything I was doing before, without pain\"    Potential Barriers for PT: [] pain, [] financial, [] time, [] transportation, [] other:  Motivation: Good  Cognition: A&O x 3  Denies Red Flags: SOB, chest pain, dizziness/lightheadedness, blurred/double vision, HA, chills/fevers, night sweats, change in bowel/bladder control, abdominal pain, difficulty swallowing, slurred speech, unexplained weight gain/loss, nausea, and/or vomiting.   Any medication changes, allergies to medications, adverse drug reactions, diagnosis change, or new procedure performed?: [x] No     [] Yes (see summary sheet for update)      OBJECTIVE/EXAMINATION:    14 min [x] Eval                  [] Re-Evaluation     25 min Therapeutic Exercise:  [x] See flow sheet :   Rationale: patient education of what to expect day of surgery/post-op as far as FWW and movement; education and review of therapeutic exercises to improve ROM, strength, and function; education of edema reduction techniques including ice/elevation/ankle pumps; answered patient questions        With   [x] TE   [] TA   [] Neuro   [] Other: Patient Education: [x] Review HEP    [] Progressed/Changed HEP based on:   [] positioning   [] body mechanics   [] transfers   [] heat/ice application    [] other:      Physical Therapy Evaluation:    Inspection:   [x] Patient in no apparent distress    Posture: poor posture with loss of cervical lordosis and forward shoulders    Gait: mildly antalgic, lacking bilateral TKE prior to heel strike    Palpation: no c/o TTP throughout palpation left LE    AROM (degrees): 8-125 deg    PROM (degrees): 5-130 deg    MMT: Grossly 5/5 bilaterally except bilateral hip flexion 4+/5    Special Tests: n/a    Other Comments: none     Pain Level (out of 0-10 scale) Post Treatment: 1      ASSESSMENT:  Patient is a very pleasant 66 y.o. male presenting to skilled PT for a pre-op visit prior to his scheduled left TKR on 5/4/22 with Dr. Cyn Barnes. Patient presents today with limitations in left knee extension, which is also noted during his abnormal gait mechanics. Strength is WFL at this time. Patient was extensively educated today regarding pre/post op expectations as far as PT, edema reduction techniques, and also on exercises he may start day of surgery. Patients questions were answered to his content. Will plan to reassess patient s/p surgery. Patient would benefit from skilled PT services to modify and progress therapeutic interventions, address functional mobility deficits, address ROM deficits, address strength deficits, analyze and address soft tissue restrictions, analyze and cue movement patterns, analyze and modify body mechanics/ergonomics, and assess and modify postural abnormalities to attain remaining goals. Patient did well with today's evaluation. Patient was educated in [facility] and all questions were answered. [x]  See Plan of Care  []  See Progress Note/Recertification  []  See Discharge Summary         GOALS:  Short Term Goals:  to be accomplished within 4 weeks:    Patient will report compliance with prescribed HEP(s) at least 1x/day in order to assist in maximizing therapeutic gains, reduce symptoms, and to progress towards overall prior function.   Pre-Op: HEP issued and reviewed with patient, patient also educated in edema reduction techniques  Current: same as eval    Patient will demonstrate at least 5-100 deg of left knee AROM post-operatively in order to return to prior functional activities and mobility. Pre-Op AROM: 8-125 deg  Current: same as eval      Long Term Goals:  to be accomplished within 9 weeks:    Patient will score at least 64 points on FOTO in order to maximize function and promote patient satisfaction with overall outcome. ([address] is an established functional score where 100 = no disability)  Pre-Op FOTO: 52  Current: same as eval    Patient will report less than 2/10 pain during all functional activities and ADLs in order to improve QOL and return to patient's PLOF. Post-Op Reassess: to be completed s/p surgery  Current: same as eval    Patient will demonstrate functional and painfree AROM of at least 0-120 deg in order to return to prior functional activities and mobility. Pre-Op AROM: 8-125 deg  Current: same as eval    Patient will demonstrate at least 5/5 strength bilaterally in order to be able to safely perform functional activities and demonstrate improved stability and strength. Pre-Op Strength: Grossly 5/5 bilaterally except bilateral hip flexion 4+/5  Current: same as eval    Patient will be able to safely ambulate community distances without and normal gait mechanics in order to improve overall mobility and return patient to his or her PLOF. Eval: mildly antalgic, lacking bilateral TKE prior to heel strike (pre-op)  Current: same as eval    Patient will be able to safely negotiate a full flight of stairs with a step-through pattern while holding onto at least one handrail in order to return to normal with this functional activity and improve safety on stairs.   Eval: FF step-through pattern (pre-op)  Current: same as eval        PLAN  [x]  Continue Plan of Care  []  Upgrade Activities as Tolerated       [x]  Update Interventions per Flow Sheet, as Tolerated by Patient       []  Discharge Due To:   []  Other: Thank you for the referral to [facility]. Bonny Dasilva.  Suman, PT, DPT, [address]     2022     8:47 AM      Patient Name: Myrna Shoulder Patient : 1950

## 2022-04-30 ENCOUNTER — ANESTHESIA EVENT (OUTPATIENT)
Dept: SURGERY | Age: 72
DRG: 470 | End: 2022-04-30
Payer: MEDICARE

## 2022-05-04 ENCOUNTER — APPOINTMENT (OUTPATIENT)
Dept: GENERAL RADIOLOGY | Age: 72
DRG: 470 | End: 2022-05-04
Attending: ORTHOPAEDIC SURGERY
Payer: MEDICARE

## 2022-05-04 ENCOUNTER — HOSPITAL ENCOUNTER (INPATIENT)
Age: 72
LOS: 1 days | Discharge: HOME | DRG: 470 | End: 2022-05-04
Attending: ORTHOPAEDIC SURGERY | Admitting: ORTHOPAEDIC SURGERY
Payer: MEDICARE

## 2022-05-04 ENCOUNTER — ANESTHESIA (OUTPATIENT)
Dept: SURGERY | Age: 72
DRG: 470 | End: 2022-05-04
Payer: MEDICARE

## 2022-05-04 VITALS
BODY MASS INDEX: 31.6 KG/M2 | SYSTOLIC BLOOD PRESSURE: 130 MMHG | HEIGHT: 72 IN | DIASTOLIC BLOOD PRESSURE: 63 MMHG | TEMPERATURE: 98.2 F | RESPIRATION RATE: 16 BRPM | OXYGEN SATURATION: 95 % | HEART RATE: 81 BPM | WEIGHT: 233.3 LBS

## 2022-05-04 DIAGNOSIS — M17.12 UNILATERAL PRIMARY OSTEOARTHRITIS, LEFT KNEE: Primary | ICD-10-CM

## 2022-05-04 LAB
ABO + RH BLD: NORMAL
BLOOD GROUP ANTIBODIES SERPL: NORMAL
GLUCOSE BLD STRIP.AUTO-MCNC: 95 MG/DL (ref 70–110)
SPECIMEN EXP DATE BLD: NORMAL

## 2022-05-04 PROCEDURE — 77030003666 HC NDL SPINAL BD -A: Performed by: ORTHOPAEDIC SURGERY

## 2022-05-04 PROCEDURE — 74011250636 HC RX REV CODE- 250/636: Performed by: SPECIALIST

## 2022-05-04 PROCEDURE — 77030040361 HC SLV COMPR DVT MDII -B: Performed by: ORTHOPAEDIC SURGERY

## 2022-05-04 PROCEDURE — C1713 ANCHOR/SCREW BN/BN,TIS/BN: HCPCS | Performed by: ORTHOPAEDIC SURGERY

## 2022-05-04 PROCEDURE — 97165 OT EVAL LOW COMPLEX 30 MIN: CPT

## 2022-05-04 PROCEDURE — 74011250637 HC RX REV CODE- 250/637: Performed by: ORTHOPAEDIC SURGERY

## 2022-05-04 PROCEDURE — 76010000132 HC OR TIME 2.5 TO 3 HR: Performed by: ORTHOPAEDIC SURGERY

## 2022-05-04 PROCEDURE — 76942 ECHO GUIDE FOR BIOPSY: CPT | Performed by: ORTHOPAEDIC SURGERY

## 2022-05-04 PROCEDURE — 74011000250 HC RX REV CODE- 250: Performed by: ORTHOPAEDIC SURGERY

## 2022-05-04 PROCEDURE — 77030013708 HC HNDPC SUC IRR PULS STRY –B: Performed by: ORTHOPAEDIC SURGERY

## 2022-05-04 PROCEDURE — C9290 INJ, BUPIVACAINE LIPOSOME: HCPCS | Performed by: ORTHOPAEDIC SURGERY

## 2022-05-04 PROCEDURE — C1887 CATHETER, GUIDING: HCPCS | Performed by: ORTHOPAEDIC SURGERY

## 2022-05-04 PROCEDURE — 76210000063 HC OR PH I REC FIRST 0.5 HR: Performed by: ORTHOPAEDIC SURGERY

## 2022-05-04 PROCEDURE — 76060000036 HC ANESTHESIA 2.5 TO 3 HR: Performed by: ORTHOPAEDIC SURGERY

## 2022-05-04 PROCEDURE — 77030000032 HC CUF TRNQT ZIMM -B: Performed by: ORTHOPAEDIC SURGERY

## 2022-05-04 PROCEDURE — 97530 THERAPEUTIC ACTIVITIES: CPT

## 2022-05-04 PROCEDURE — 77030027138 HC INCENT SPIROMETER -A: Performed by: ORTHOPAEDIC SURGERY

## 2022-05-04 PROCEDURE — 77030038010: Performed by: ORTHOPAEDIC SURGERY

## 2022-05-04 PROCEDURE — 77030018842 HC SOL IRR SOD CL 9% BAXT -A: Performed by: ORTHOPAEDIC SURGERY

## 2022-05-04 PROCEDURE — 74011000258 HC RX REV CODE- 258: Performed by: ORTHOPAEDIC SURGERY

## 2022-05-04 PROCEDURE — 77030020407 HC IV BLD WRMR ST 3M -A: Performed by: SPECIALIST

## 2022-05-04 PROCEDURE — C1776 JOINT DEVICE (IMPLANTABLE): HCPCS | Performed by: ORTHOPAEDIC SURGERY

## 2022-05-04 PROCEDURE — 36415 COLL VENOUS BLD VENIPUNCTURE: CPT

## 2022-05-04 PROCEDURE — 74011250636 HC RX REV CODE- 250/636: Performed by: ORTHOPAEDIC SURGERY

## 2022-05-04 PROCEDURE — 64447 NJX AA&/STRD FEMORAL NRV IMG: CPT | Performed by: SPECIALIST

## 2022-05-04 PROCEDURE — 2709999900 HC NON-CHARGEABLE SUPPLY: Performed by: ORTHOPAEDIC SURGERY

## 2022-05-04 PROCEDURE — 77030020782 HC GWN BAIR PAWS FLX 3M -B: Performed by: ORTHOPAEDIC SURGERY

## 2022-05-04 PROCEDURE — 82962 GLUCOSE BLOOD TEST: CPT

## 2022-05-04 PROCEDURE — 0SRD0J9 REPLACEMENT OF LEFT KNEE JOINT WITH SYNTHETIC SUBSTITUTE, CEMENTED, OPEN APPROACH: ICD-10-PCS | Performed by: ORTHOPAEDIC SURGERY

## 2022-05-04 PROCEDURE — 77030035236 HC SUT PDS STRATFX BARB J&J -B: Performed by: ORTHOPAEDIC SURGERY

## 2022-05-04 PROCEDURE — 74011250637 HC RX REV CODE- 250/637: Performed by: SPECIALIST

## 2022-05-04 PROCEDURE — 65270000029 HC RM PRIVATE

## 2022-05-04 PROCEDURE — 77030016060 HC NDL NRV BLK TELE -A: Performed by: SPECIALIST

## 2022-05-04 PROCEDURE — 97535 SELF CARE MNGMENT TRAINING: CPT

## 2022-05-04 PROCEDURE — 97161 PT EVAL LOW COMPLEX 20 MIN: CPT

## 2022-05-04 PROCEDURE — 77030008462 HC STPLR SKN PROX J&J -A: Performed by: ORTHOPAEDIC SURGERY

## 2022-05-04 PROCEDURE — 74011000250 HC RX REV CODE- 250: Performed by: SPECIALIST

## 2022-05-04 PROCEDURE — 77030031140 HC SUT VCRL3 J&J -A: Performed by: ORTHOPAEDIC SURGERY

## 2022-05-04 PROCEDURE — 86900 BLOOD TYPING SEROLOGIC ABO: CPT

## 2022-05-04 PROCEDURE — 77030011628: Performed by: ORTHOPAEDIC SURGERY

## 2022-05-04 PROCEDURE — 97116 GAIT TRAINING THERAPY: CPT

## 2022-05-04 DEVICE — CEMENT BNE 20GM HALF DOSE PMMA W/ GENT HI VISC RADPQ FAST: Type: IMPLANTABLE DEVICE | Site: KNEE | Status: FUNCTIONAL

## 2022-05-04 DEVICE — ASYMMETRIC PATELLA
Type: IMPLANTABLE DEVICE | Site: KNEE | Status: FUNCTIONAL
Brand: TRIATHLON

## 2022-05-04 DEVICE — TIBIAL COMPONENT
Type: IMPLANTABLE DEVICE | Site: KNEE | Status: FUNCTIONAL
Brand: TRIATHLON

## 2022-05-04 DEVICE — TIBIAL BEARING INSERT - CS
Type: IMPLANTABLE DEVICE | Site: KNEE | Status: FUNCTIONAL
Brand: TRIATHLON

## 2022-05-04 DEVICE — COMPONENT TOT KNEE CAPPED PRIMARY [K2STRYKER] [STRYKER CORP]: Type: IMPLANTABLE DEVICE | Site: KNEE | Status: FUNCTIONAL

## 2022-05-04 DEVICE — CRUCIATE RETAINING FEMORAL
Type: IMPLANTABLE DEVICE | Site: KNEE | Status: FUNCTIONAL
Brand: TRIATHLON

## 2022-05-04 RX ORDER — FLUTICASONE PROPIONATE 50 MCG
2 SPRAY, SUSPENSION (ML) NASAL AS NEEDED
Status: CANCELLED | OUTPATIENT
Start: 2022-05-04

## 2022-05-04 RX ORDER — HYDROMORPHONE HYDROCHLORIDE 1 MG/ML
1 INJECTION, SOLUTION INTRAMUSCULAR; INTRAVENOUS; SUBCUTANEOUS
Status: DISCONTINUED | OUTPATIENT
Start: 2022-05-04 | End: 2022-05-04 | Stop reason: HOSPADM

## 2022-05-04 RX ORDER — SODIUM CHLORIDE 0.9 % (FLUSH) 0.9 %
5-40 SYRINGE (ML) INJECTION EVERY 8 HOURS
Status: CANCELLED | OUTPATIENT
Start: 2022-05-04

## 2022-05-04 RX ORDER — AMOXICILLIN 250 MG
1 CAPSULE ORAL 2 TIMES DAILY
Status: DISCONTINUED | OUTPATIENT
Start: 2022-05-04 | End: 2022-05-04 | Stop reason: HOSPADM

## 2022-05-04 RX ORDER — DEXTROSE MONOHYDRATE 100 MG/ML
0-250 INJECTION, SOLUTION INTRAVENOUS AS NEEDED
Status: DISCONTINUED | OUTPATIENT
Start: 2022-05-04 | End: 2022-05-04 | Stop reason: HOSPADM

## 2022-05-04 RX ORDER — DEXAMETHASONE SODIUM PHOSPHATE 4 MG/ML
4 INJECTION, SOLUTION INTRA-ARTICULAR; INTRALESIONAL; INTRAMUSCULAR; INTRAVENOUS; SOFT TISSUE ONCE
Status: COMPLETED | OUTPATIENT
Start: 2022-05-04 | End: 2022-05-04

## 2022-05-04 RX ORDER — KETOROLAC TROMETHAMINE 30 MG/ML
15 INJECTION, SOLUTION INTRAMUSCULAR; INTRAVENOUS
Status: DISCONTINUED | OUTPATIENT
Start: 2022-05-04 | End: 2022-05-04 | Stop reason: HOSPADM

## 2022-05-04 RX ORDER — KETAMINE HCL IN 0.9 % NACL 50 MG/5 ML
SYRINGE (ML) INTRAVENOUS AS NEEDED
Status: DISCONTINUED | OUTPATIENT
Start: 2022-05-04 | End: 2022-05-04 | Stop reason: HOSPADM

## 2022-05-04 RX ORDER — CELECOXIB 100 MG/1
200 CAPSULE ORAL ONCE
Status: COMPLETED | OUTPATIENT
Start: 2022-05-04 | End: 2022-05-04

## 2022-05-04 RX ORDER — SODIUM CHLORIDE, SODIUM LACTATE, POTASSIUM CHLORIDE, CALCIUM CHLORIDE 600; 310; 30; 20 MG/100ML; MG/100ML; MG/100ML; MG/100ML
125 INJECTION, SOLUTION INTRAVENOUS CONTINUOUS
Status: DISCONTINUED | OUTPATIENT
Start: 2022-05-04 | End: 2022-05-04 | Stop reason: HOSPADM

## 2022-05-04 RX ORDER — HYDROCHLOROTHIAZIDE 25 MG/1
25 TABLET ORAL DAILY
Status: CANCELLED | OUTPATIENT
Start: 2022-05-05

## 2022-05-04 RX ORDER — FENTANYL CITRATE 50 UG/ML
25 INJECTION, SOLUTION INTRAMUSCULAR; INTRAVENOUS AS NEEDED
Status: DISCONTINUED | OUTPATIENT
Start: 2022-05-04 | End: 2022-05-04 | Stop reason: HOSPADM

## 2022-05-04 RX ORDER — FENTANYL CITRATE 50 UG/ML
50 INJECTION, SOLUTION INTRAMUSCULAR; INTRAVENOUS
Status: DISCONTINUED | OUTPATIENT
Start: 2022-05-04 | End: 2022-05-04 | Stop reason: HOSPADM

## 2022-05-04 RX ORDER — ACETAMINOPHEN 325 MG/1
650 TABLET ORAL
Status: DISCONTINUED | OUTPATIENT
Start: 2022-05-04 | End: 2022-05-04 | Stop reason: HOSPADM

## 2022-05-04 RX ORDER — DOCUSATE SODIUM 100 MG/1
100 CAPSULE, LIQUID FILLED ORAL 2 TIMES DAILY
Qty: 28 CAPSULE | Refills: 0 | Status: SHIPPED
Start: 2022-05-04 | End: 2022-05-18

## 2022-05-04 RX ORDER — ATORVASTATIN CALCIUM 10 MG/1
10 TABLET, FILM COATED ORAL DAILY
Status: CANCELLED | OUTPATIENT
Start: 2022-05-05

## 2022-05-04 RX ORDER — OXYCODONE HYDROCHLORIDE 5 MG/1
5 TABLET ORAL
Status: COMPLETED | OUTPATIENT
Start: 2022-05-04 | End: 2022-05-04

## 2022-05-04 RX ORDER — TRANEXAMIC ACID 10 MG/ML
1 INJECTION, SOLUTION INTRAVENOUS ONCE
Status: COMPLETED | OUTPATIENT
Start: 2022-05-04 | End: 2022-05-04

## 2022-05-04 RX ORDER — POTASSIUM CHLORIDE 1.5 G/1.77G
20 POWDER, FOR SOLUTION ORAL
COMMUNITY

## 2022-05-04 RX ORDER — DEXMEDETOMIDINE HYDROCHLORIDE 100 UG/ML
INJECTION, SOLUTION INTRAVENOUS
Status: COMPLETED | OUTPATIENT
Start: 2022-05-04 | End: 2022-05-04

## 2022-05-04 RX ORDER — SODIUM CHLORIDE 9 MG/ML
50 INJECTION, SOLUTION INTRAVENOUS CONTINUOUS
Status: DISCONTINUED | OUTPATIENT
Start: 2022-05-04 | End: 2022-05-04 | Stop reason: HOSPADM

## 2022-05-04 RX ORDER — NALOXONE HYDROCHLORIDE 0.4 MG/ML
0.1 INJECTION, SOLUTION INTRAMUSCULAR; INTRAVENOUS; SUBCUTANEOUS AS NEEDED
Status: DISCONTINUED | OUTPATIENT
Start: 2022-05-04 | End: 2022-05-04 | Stop reason: HOSPADM

## 2022-05-04 RX ORDER — SODIUM CHLORIDE 9 MG/ML
125 INJECTION, SOLUTION INTRAVENOUS CONTINUOUS
Status: DISCONTINUED | OUTPATIENT
Start: 2022-05-04 | End: 2022-05-04 | Stop reason: HOSPADM

## 2022-05-04 RX ORDER — LANOLIN ALCOHOL/MO/W.PET/CERES
3 CREAM (GRAM) TOPICAL
Status: DISCONTINUED | OUTPATIENT
Start: 2022-05-04 | End: 2022-05-04 | Stop reason: HOSPADM

## 2022-05-04 RX ORDER — PROPOFOL 10 MG/ML
INJECTION, EMULSION INTRAVENOUS
Status: DISCONTINUED | OUTPATIENT
Start: 2022-05-04 | End: 2022-05-04 | Stop reason: HOSPADM

## 2022-05-04 RX ORDER — DEXAMETHASONE SODIUM PHOSPHATE 10 MG/ML
INJECTION INTRAMUSCULAR; INTRAVENOUS
Status: COMPLETED | OUTPATIENT
Start: 2022-05-04 | End: 2022-05-04

## 2022-05-04 RX ORDER — HYDROMORPHONE HYDROCHLORIDE 1 MG/ML
0.2 INJECTION, SOLUTION INTRAMUSCULAR; INTRAVENOUS; SUBCUTANEOUS
Status: DISCONTINUED | OUTPATIENT
Start: 2022-05-04 | End: 2022-05-04 | Stop reason: HOSPADM

## 2022-05-04 RX ORDER — ASPIRIN 325 MG
325 TABLET, DELAYED RELEASE (ENTERIC COATED) ORAL 2 TIMES DAILY
Qty: 60 TABLET | Refills: 0 | Status: SHIPPED
Start: 2022-05-04 | End: 2022-06-03

## 2022-05-04 RX ORDER — OXYCODONE AND ACETAMINOPHEN 5; 325 MG/1; MG/1
1 TABLET ORAL
Status: DISCONTINUED | OUTPATIENT
Start: 2022-05-04 | End: 2022-05-04 | Stop reason: HOSPADM

## 2022-05-04 RX ORDER — MAGNESIUM SULFATE 100 %
4 CRYSTALS MISCELLANEOUS AS NEEDED
Status: DISCONTINUED | OUTPATIENT
Start: 2022-05-04 | End: 2022-05-04 | Stop reason: HOSPADM

## 2022-05-04 RX ORDER — ROPIVACAINE HYDROCHLORIDE 5 MG/ML
INJECTION, SOLUTION EPIDURAL; INFILTRATION; PERINEURAL
Status: COMPLETED | OUTPATIENT
Start: 2022-05-04 | End: 2022-05-04

## 2022-05-04 RX ORDER — FACIAL-BODY WIPES
10 EACH TOPICAL DAILY PRN
Status: DISCONTINUED | OUTPATIENT
Start: 2022-05-04 | End: 2022-05-04 | Stop reason: HOSPADM

## 2022-05-04 RX ORDER — TELMISARTAN 40 MG/1
40 TABLET ORAL DAILY
Status: CANCELLED | OUTPATIENT
Start: 2022-05-05

## 2022-05-04 RX ORDER — BUPIVACAINE HYDROCHLORIDE 7.5 MG/ML
INJECTION, SOLUTION EPIDURAL; RETROBULBAR
Status: COMPLETED | OUTPATIENT
Start: 2022-05-04 | End: 2022-05-04

## 2022-05-04 RX ORDER — ASPIRIN 325 MG
325 TABLET, DELAYED RELEASE (ENTERIC COATED) ORAL 2 TIMES DAILY
Status: DISCONTINUED | OUTPATIENT
Start: 2022-05-04 | End: 2022-05-04 | Stop reason: HOSPADM

## 2022-05-04 RX ORDER — VANCOMYCIN HYDROCHLORIDE 1 G/20ML
INJECTION, POWDER, LYOPHILIZED, FOR SOLUTION INTRAVENOUS AS NEEDED
Status: DISCONTINUED | OUTPATIENT
Start: 2022-05-04 | End: 2022-05-04 | Stop reason: HOSPADM

## 2022-05-04 RX ORDER — SODIUM CHLORIDE 0.9 % (FLUSH) 0.9 %
5-40 SYRINGE (ML) INJECTION AS NEEDED
Status: CANCELLED | OUTPATIENT
Start: 2022-05-04

## 2022-05-04 RX ORDER — MINERAL OIL
180 ENEMA (ML) RECTAL DAILY
Status: CANCELLED | OUTPATIENT
Start: 2022-05-05

## 2022-05-04 RX ORDER — SODIUM CHLORIDE 0.9 % (FLUSH) 0.9 %
5-40 SYRINGE (ML) INJECTION AS NEEDED
Status: DISCONTINUED | OUTPATIENT
Start: 2022-05-04 | End: 2022-05-04 | Stop reason: HOSPADM

## 2022-05-04 RX ORDER — OXYCODONE AND ACETAMINOPHEN 5; 325 MG/1; MG/1
1 TABLET ORAL
Qty: 28 TABLET | Refills: 0 | Status: SHIPPED
Start: 2022-05-04 | End: 2022-05-11

## 2022-05-04 RX ORDER — ONDANSETRON 2 MG/ML
4 INJECTION INTRAMUSCULAR; INTRAVENOUS
Status: DISCONTINUED | OUTPATIENT
Start: 2022-05-04 | End: 2022-05-04 | Stop reason: HOSPADM

## 2022-05-04 RX ORDER — ONDANSETRON 2 MG/ML
4 INJECTION INTRAMUSCULAR; INTRAVENOUS ONCE
Status: DISCONTINUED | OUTPATIENT
Start: 2022-05-04 | End: 2022-05-04 | Stop reason: HOSPADM

## 2022-05-04 RX ORDER — NALOXONE HYDROCHLORIDE 0.4 MG/ML
0.4 INJECTION, SOLUTION INTRAMUSCULAR; INTRAVENOUS; SUBCUTANEOUS AS NEEDED
Status: DISCONTINUED | OUTPATIENT
Start: 2022-05-04 | End: 2022-05-04 | Stop reason: HOSPADM

## 2022-05-04 RX ORDER — ACETAMINOPHEN 500 MG
1000 TABLET ORAL ONCE
Status: COMPLETED | OUTPATIENT
Start: 2022-05-04 | End: 2022-05-04

## 2022-05-04 RX ORDER — VANCOMYCIN/0.9 % SOD CHLORIDE 1.5G/250ML
1500 PLASTIC BAG, INJECTION (ML) INTRAVENOUS ONCE
Status: COMPLETED | OUTPATIENT
Start: 2022-05-04 | End: 2022-05-04

## 2022-05-04 RX ORDER — MIDAZOLAM HYDROCHLORIDE 1 MG/ML
INJECTION, SOLUTION INTRAMUSCULAR; INTRAVENOUS AS NEEDED
Status: DISCONTINUED | OUTPATIENT
Start: 2022-05-04 | End: 2022-05-04 | Stop reason: HOSPADM

## 2022-05-04 RX ORDER — PREGABALIN 25 MG/1
25 CAPSULE ORAL ONCE
Status: COMPLETED | OUTPATIENT
Start: 2022-05-04 | End: 2022-05-04

## 2022-05-04 RX ORDER — DIPHENHYDRAMINE HCL 25 MG
25 CAPSULE ORAL
Status: DISCONTINUED | OUTPATIENT
Start: 2022-05-04 | End: 2022-05-04 | Stop reason: HOSPADM

## 2022-05-04 RX ORDER — OXYCODONE AND ACETAMINOPHEN 10; 325 MG/1; MG/1
1 TABLET ORAL
Status: DISCONTINUED | OUTPATIENT
Start: 2022-05-04 | End: 2022-05-04 | Stop reason: HOSPADM

## 2022-05-04 RX ADMIN — CELECOXIB 200 MG: 100 CAPSULE ORAL at 06:17

## 2022-05-04 RX ADMIN — MIDAZOLAM HYDROCHLORIDE 1 MG: 1 INJECTION, SOLUTION INTRAMUSCULAR; INTRAVENOUS at 07:38

## 2022-05-04 RX ADMIN — MIDAZOLAM HYDROCHLORIDE 1 MG: 1 INJECTION, SOLUTION INTRAMUSCULAR; INTRAVENOUS at 07:59

## 2022-05-04 RX ADMIN — DEXAMETHASONE SODIUM PHOSPHATE 4 MG: 4 INJECTION, SOLUTION INTRAMUSCULAR; INTRAVENOUS at 06:17

## 2022-05-04 RX ADMIN — SODIUM CHLORIDE, SODIUM LACTATE, POTASSIUM CHLORIDE, AND CALCIUM CHLORIDE: 600; 310; 30; 20 INJECTION, SOLUTION INTRAVENOUS at 08:17

## 2022-05-04 RX ADMIN — VANCOMYCIN HYDROCHLORIDE 1500 MG: 500 INJECTION, POWDER, LYOPHILIZED, FOR SOLUTION INTRAVENOUS at 07:25

## 2022-05-04 RX ADMIN — PREGABALIN 25 MG: 25 CAPSULE ORAL at 06:17

## 2022-05-04 RX ADMIN — BUPIVACAINE HYDROCHLORIDE 15 MG: 7.5 INJECTION, SOLUTION EPIDURAL; RETROBULBAR at 07:50

## 2022-05-04 RX ADMIN — Medication 10 MG: at 07:59

## 2022-05-04 RX ADMIN — ACETAMINOPHEN 1000 MG: 500 TABLET ORAL at 06:17

## 2022-05-04 RX ADMIN — SODIUM CHLORIDE 50 ML/HR: 900 INJECTION, SOLUTION INTRAVENOUS at 11:45

## 2022-05-04 RX ADMIN — DEXAMETHASONE SODIUM PHOSPHATE 4 MG: 10 INJECTION, SOLUTION INTRAMUSCULAR; INTRAVENOUS at 08:29

## 2022-05-04 RX ADMIN — MIDAZOLAM HYDROCHLORIDE 2 MG: 1 INJECTION, SOLUTION INTRAMUSCULAR; INTRAVENOUS at 07:17

## 2022-05-04 RX ADMIN — OXYCODONE HYDROCHLORIDE AND ACETAMINOPHEN 1 TABLET: 5; 325 TABLET ORAL at 13:52

## 2022-05-04 RX ADMIN — SODIUM CHLORIDE, SODIUM LACTATE, POTASSIUM CHLORIDE, AND CALCIUM CHLORIDE 125 ML/HR: 600; 310; 30; 20 INJECTION, SOLUTION INTRAVENOUS at 06:18

## 2022-05-04 RX ADMIN — DEXMEDETOMIDINE HYDROCHLORIDE 25 MCG: 100 INJECTION, SOLUTION INTRAVENOUS at 08:29

## 2022-05-04 RX ADMIN — Medication 10 MG: at 09:15

## 2022-05-04 RX ADMIN — TRANEXAMIC ACID 1 G: 10 INJECTION, SOLUTION INTRAVENOUS at 07:37

## 2022-05-04 RX ADMIN — Medication 10 MG: at 07:38

## 2022-05-04 RX ADMIN — OXYCODONE HYDROCHLORIDE 5 MG: 5 TABLET ORAL at 12:23

## 2022-05-04 RX ADMIN — ROPIVACAINE HYDROCHLORIDE 30 ML: 5 INJECTION, SOLUTION EPIDURAL; INFILTRATION; PERINEURAL at 08:29

## 2022-05-04 RX ADMIN — MIDAZOLAM HYDROCHLORIDE 1 MG: 1 INJECTION, SOLUTION INTRAMUSCULAR; INTRAVENOUS at 08:59

## 2022-05-04 RX ADMIN — Medication 10 MG: at 07:17

## 2022-05-04 RX ADMIN — PROPOFOL 25 MCG/KG/MIN: 10 INJECTION, EMULSION INTRAVENOUS at 08:02

## 2022-05-04 RX ADMIN — SODIUM CHLORIDE, SODIUM LACTATE, POTASSIUM CHLORIDE, AND CALCIUM CHLORIDE 1000 ML: 600; 310; 30; 20 INJECTION, SOLUTION INTRAVENOUS at 06:17

## 2022-05-04 RX ADMIN — SODIUM CHLORIDE, SODIUM LACTATE, POTASSIUM CHLORIDE, AND CALCIUM CHLORIDE: 600; 310; 30; 20 INJECTION, SOLUTION INTRAVENOUS at 09:59

## 2022-05-04 RX ADMIN — Medication 10 MG: at 08:59

## 2022-05-04 RX ADMIN — Medication 1 TABLET: at 12:23

## 2022-05-04 NOTE — DISCHARGE INSTRUCTIONS
Total Knee Replacement Discharge Instructions    ACTIVITIES :  o Do not sit for more than 1 hour at a time while you are awake. Walking is the best way to recover after knee replacement. o Do the exercises your Physical Therapist tells you to do at least twice a day.   o Use your walker until your Physical Therapist tells you it is safe to stop.   o Sit in chairs with arms. o Rest when you feel tired. You may take a nap, but don't stay in bed all day.   o Avoid jogging, running, kneeling, vacuuming, carrying heavy bags, or bending over. [place] Reading Dr. Zoya Lua when it is safe to have sex. BATHING and INCISION CARE:  o Do not remove the ace wrap. It will stay on until you go back for your first postop checkup.    o Keep the ace wrap clean and dry. The incision is closed with staples that can't get wet. o No showering until after your first post op checkup.   o Bath off daily using dial soap.   o Call Dr. Zoya Lua if the ace wrap gets wet, soiled, or has drainage. ICE and ELEVATION:  o Use ice 20 minutes on and off to ease swelling and pain.   o Elevate your leg with the knee straight when resting. Toes above Nose  o Do not use heat on your leg.   o Ice and elevate as long as you have pain and swelling. MEDICATIONS -  1. Daily Medications:  o Resume the medications you were taking before surgery (unless instructed by your doctor not to). o You may want to wait on taking herbal supplements, vitamins,  and over the counter medication the first few days after surgery. 2. Blood Thinner Medication:   o Take your blood thinner medication as prescribed. o DO NOT skip a dose.   o Remember you will bruise and bleed easier while taking this medication to prevent blood clots. 3. Narcotic Pain Medication:  o Take the narcotic pain medication as prescribed. o Do not take more than prescribed. o Eat before taking the narcotic pain medication (even in the middle of the night).    o Call Dr. Zoya Lua if the pain medication is not helping you achieve pain tolerance.  o Begin to wean yourself off the pain medication during the second week after surgery. o Refill: Call the office at least 48 hours before your prescription runs out. Do not wait until your bottle is empty to call for a refill.    o Do not drive while taking a narcotic. 4. Stool Softener:  o Take the prescribed stool softener while taking your narcotic pain medication. o Drink plenty of water so the stool softener works. o If your bowels do not move within 3 days of surgery, you may need to take an over-the-counter laxative. NUTRITION :  o Good nutrition is an essential part of healing. o Eat a balanced diet each day, including fruits, vegetables, dairy products, and protein. o Drink plenty of water (unless your doctor tells you not to). o Add Protein Supplements if you are not eating normally. It is normal not to be hungry after surgery. o Call Dr. Andres Arriaga if develop nausea/vomiting. DRIVING:   o Do not drive until Dr. Andres Arriaga tells you it is safe.   o NEVER drive while taking narcotic medication. RETURN TO WORK :  o The decision to return to work will be determined on an individual basis. o  If you need a note, please let Dr. Andres Arriaga know as soon as possible. WHEN SHOULD YOU CALL FOR HELP? CALL DR. LR IF:    You have signs of an infection:  o Fever 100. 5 or higher  o Foul smell   o Drainage on ace wrap.  o Productive Cough  o Burning or pain upon urination   You have signs of a blood clot  o Pain in calf, back of knee, thigh, or groin  o Redness or swelling that does not improve with ice and elevation   You have not had a bowel movement after taking a laxative.      CALL 911 IF:    You pass out (lose consciousness)   You have sudden severe trouble breathing while resting   You have severe pain when you take a deep breath   You have sudden chest pain

## 2022-05-04 NOTE — PROGRESS NOTES
Problem: Self Care Deficits Care Plan (Adult)  Goal: *Acute Goals and Plan of Care (Insert Text)  Description: Occupational Therapy Goals  Initiated 5/4/2022 within 7 day(s). 1.  Patient will perform grooming with modified independence standing at sink for 5 minutes or more. 2.  Patient will perform lower body dressing with modified independence. 3.  Patient will perform toilet transfers with modified independence. 4.  Patient will perform all aspects of toileting with modified independence. 5.  Patient will utilize energy conservation techniques during functional activities with verbal, visual, and tactile cues. OCCUPATIONAL THERAPY EVALUATION    Patient: Juana Barrios (82 y.o. male)  Date: 5/4/2022  Primary Diagnosis: Unilateral primary osteoarthritis, left knee [M17.12]  Procedure(s) (LRB):  LEFT TOTAL KNEE ARTHOPLASTY WITH C-ARM (Left) Day of Surgery   Precautions:   Fall,WBAT  PLOF: independent with ADLs and transfers     ASSESSMENT :  Based on the objective data described below, the patient presents with decreased strength, endurance and balance for carryover of ADLs and transfers following L TKA. Pt co-treat with PT for the need of another set of skilled hands and safety with transfers/ADLs. Pt participate with UB and LB dressing, bed mobility, supine<>sit and sit<>stand with CG-SBA during this session. Pt ambulate with PT in hallway and practice step negotiation for carryover of activity at home with safety. Pt report some lightheadedness after practicing steps negotiation, VS recorded for during and at the end of session (see flowsheet for details). Pt was left supine in bed at the end of session in NAD. Pt education on icing, elevation and safety precautions for ADLs and transfers reviewed. Pt verbalized understanding for the same. Patient will benefit from skilled intervention to address the above impairments.   Patient's rehabilitation potential is considered to be Good  Factors which may influence rehabilitation potential include:   [x]             None noted  []             Mental ability/status  []             Medical condition  []             Home/family situation and support systems  []             Safety awareness  []             Pain tolerance/management  []             Other:      PLAN :  Recommendations and Planned Interventions:   [x]               Self Care Training                  [x]      Therapeutic Activities  [x]               Functional Mobility Training   []      Cognitive Retraining  []               Therapeutic Exercises           [x]      Endurance Activities  [x]               Balance Training                    []      Neuromuscular Re-Education  []               Visual/Perceptual Training     [x]      Home Safety Training  [x]               Patient Education                   [x]      Family Training/Education  []               Other (comment):    Frequency/Duration: Patient will be followed by occupational therapy 1-2 times per day/4-7 days per week to address goals. Discharge Recommendations: Home Health  Further Equipment Recommendations for Discharge: N/A     SUBJECTIVE:   Patient stated  I am doing better.     OBJECTIVE DATA SUMMARY:     Past Medical History:   Diagnosis Date    Arthritis     Enlarged prostate     Frequency of urination 5/19/2016    77 y.o. male who presents today for frequent urination. Mainly nocturia 3-4 x S/p TURP  2014 - Dr. Sylvia Bautista [facility]), on Sleep apnea?   On Proscar and Uroxatral  For many years  , PSA done by pcp Dr. Bishop Acuña- was okay per patient, FOS decent , Nocturia 3-4 , dysuria n , frequency y , urgency n, hematuria n PSA  3/16  0.11     HTN (hypertension)     Hypercholesteremia     Sleep apnea     Uses cpap- instructed to bring dos    Urinary frequency     UTI (urinary tract infection)      Past Surgical History:   Procedure Laterality Date    HX CATARACT REMOVAL Bilateral 2017    HX KNEE REPLACEMENT Right 2014    HX TURP  2014    Dr Erik Winchester [employer]    HX UROLOGICAL  2014    TURP     Barriers to Learning/Limitations: None  Compensate with: visual, verbal, tactile, kinesthetic cues/model    Home Situation:   Home Situation  Home Environment: Private residence  # Steps to Enter: 3  Rails to Enter: Yes  Hand Rails : Bilateral (wide)  One/Two Story Residence: Two story  # of Interior Steps: 12  Interior Rails: Right  Lift Chair Available: No  Living Alone: No  Support Systems: Spouse/Significant Other  Patient Expects to be Discharged to[de-identified] Home with home health  Current DME Used/Available at Home: Walker, rolling,Shower chair,CPAP,Raised toilet seat  Tub or Shower Type: Shower  []  Right hand dominant   []  Left hand dominant    Cognitive/Behavioral Status:  Neurologic State: Alert; Appropriate for age  Orientation Level: Oriented X4  Cognition: Follows commands  Safety/Judgement: Awareness of environment; Fall prevention    Skin: intact  Edema: none    Vision/Perceptual:    Tracking: Able to track stimulus in all quadrants w/o difficulty    Coordination: BUE  Coordination: Within functional limits  Fine Motor Skills-Upper: Left Intact; Right Intact    Gross Motor Skills-Upper: Left Intact; Right Intact  Balance:  Sitting: Intact  Standing: Intact; With support  Strength: BUE  Strength: Generally decreased, functional  Tone & Sensation: BUE  Tone: Normal  Sensation: Intact  Range of Motion: BUE  AROM: Generally decreased, functional  Functional Mobility and Transfers for ADLs:  Bed Mobility:  Supine to Sit: Stand-by assistance  Sit to Supine: Stand-by assistance  Scooting: Supervision  Transfers:  Sit to Stand: Contact guard assistance;Stand-by assistance (vc)  Stand to Sit: Contact guard assistance;Stand-by assistance (vc)  Bed to Chair: Contact guard assistance;Stand-by assistance (vc)  ADL Assessment:   Feeding: Independent    Oral Facial Hygiene/Grooming: Contact guard assistance    Upper Body Dressing: Supervision    Lower Body Dressing: Contact guard assistance    Toileting: Contact guard assistance  ADL Intervention:  Upper Body Dressing Assistance  Dressing Assistance: Supervision  Pullover Shirt: Supervision    Lower Body Dressing Assistance  Dressing Assistance: Contact guard assistance  Underpants: Contact guard assistance  Pants With Button/Zipper: Contact guard assistance  Leg Crossed Method Used: No  Position Performed: Bending forward method;Seated edge of bed  Cues: Don    Cognitive Retraining  Safety/Judgement: Awareness of environment; Fall prevention  Pain:  Pain level pre-treatment: 0/10   Pain level post-treatment: 0/10   Pain Intervention(s): Medication (see MAR); Rest, Ice, Repositioning   Response to intervention: Nurse notified, See doc flow    Activity Tolerance:   Good     Please refer to the flowsheet for vital signs taken during this treatment. After treatment:   [] Patient left in no apparent distress sitting up in chair  [x] Patient left in no apparent distress in bed  [x] Call bell left within reach  [x] Nursing notified  [] Caregiver present  [] Bed alarm activated    COMMUNICATION/EDUCATION:   [x] Role of Occupational Therapy in the acute care setting  [x] Home safety education was provided and the patient/caregiver indicated understanding. [x] Patient/family have participated as able in goal setting and plan of care. [x] Patient/family agree to work toward stated goals and plan of care. [] Patient understands intent and goals of therapy, but is neutral about his/her participation. [] Patient is unable to participate in goal setting and plan of care. Thank you for this referral.  Saad Best OTR/L  Time Calculation: 45 mins    Eval Complexity: History: LOW Complexity : Brief history review ; Examination: LOW Complexity : 1-3 performance deficits relating to physical, cognitive , or psychosocial skils that result in activity limitations and / or participation restrictions ;    Decision Making:LOW Complexity : No comorbidities that affect functional and no verbal or physical assistance needed to complete eval tasks

## 2022-05-04 NOTE — H&P
Orthopedic Generic Pre-Op History and Physical    Subjective:     Patient is a 66 y.o.  male presented with a history of chronic left knee pain and mobility impairment. Onset of symptoms was gradual with gradually worsening course since that time. He is being admitted for surgical management (L TKA) of this condition. The indications for the procedure include chronic pain that has failed non operative management, mobility impairment, and diminished quality of life. Patient Active Problem List    Diagnosis Date Noted    Frequency of urination 05/19/2016     Past Medical History:   Diagnosis Date    Arthritis     Enlarged prostate     Frequency of urination 5/19/2016    77 y.o. male who presents today for frequent urination.  Mainly nocturia 3-4 x S/p TURP  2014 - Dr. Irina Garcia), on Sleep apnea?  On Proscar and Uroxatral  For many years  , PSA done by pcp Dr. Kirill Díaz- was okay per patient, FOS decent , Nocturia 3-4 , dysuria n , frequency y , urgency n, hematuria n PSA  3/16  0.11     HTN (hypertension)     Hypercholesteremia     Sleep apnea     Uses cpap- instructed to bring [facility] Urinary frequency     UTI (urinary tract infection)       Past Surgical History:   Procedure Laterality Date    HX CATARACT REMOVAL Bilateral 2017    HX KNEE REPLACEMENT Right 2014    HX TURP  2014    Dr Dasha Thompson  2014    TURP      Prior to Admission medications    Medication Sig Start Date End Date Taking? Authorizing Provider   potassium chloride (KLOR-CON) 20 mEq pack Take 20 mEq by mouth every Monday and Thursday. Yes Provider, Historical   Calcium-Cholecalciferol, D3, 600 mg-10 mcg (400 unit) chew Take 1 Tablet by mouth two (2) times a day. Yes Provider, Historical   fexofenadine (ALLEGRA) 180 mg tablet Take 180 mg by mouth daily. Yes Provider, Historical   telmisartan (MICARDIS) 40 mg tablet daily.  6/12/19  Yes Provider, Historical   hydroCHLOROthiazide (HYDRODIURIL) 25 mg tablet 25 mg daily. Indications: high blood pressure 19  Yes Provider, Historical   TYLENOL EXTRA STRENGTH 500 mg tablet 1,000 mg as needed. 18  Yes Provider, Historical   alfuzosin SR (UROXATRAL) 10 mg SR tablet Take  by mouth daily. Yes Provider, Historical   simvastatin (ZOCOR) 10 mg tablet Take  by mouth nightly. Yes Provider, Historical   fluticasone (FLONASE) 50 mcg/actuation nasal spray as needed. 18   Provider, Historical     Allergies   Allergen Reactions    Amoxicillin Other (comments)     Dehydration      Social History     Tobacco Use    Smoking status: Former Smoker     Types: Cigarettes     Quit date: 1971     Years since quittin.0    Smokeless tobacco: Never Used   Substance Use Topics    Alcohol use: Not Currently     Alcohol/week: 0.0 standard drinks      No family history on file. Review of Systems   Constitutional: Negative. HENT: Negative. Eyes: Negative. Respiratory: Negative. Cardiovascular: Negative. Gastrointestinal: Negative. Endocrine: Negative. Genitourinary: Positive for frequency. Musculoskeletal: Positive for arthralgias and joint swelling. Neurological: Negative. Hematological: Negative. Psychiatric/Behavioral: Negative. Objective:     Patient Vitals for the past 8 hrs:   BP Temp Pulse Resp SpO2 Height Weight   22 0547 135/65 97.6 °F (36.4 °C) 73 18 99 % 6' (1.829 m) 105.8 kg (233 lb 4.8 oz)       Physical Exam  Vitals reviewed. Constitutional:       Appearance: Normal appearance. HENT:      Head: Normocephalic and atraumatic. Eyes:      Extraocular Movements: Extraocular movements intact. Cardiovascular:      Rate and Rhythm: Normal rate and regular rhythm. Pulses: Normal pulses. Pulmonary:      Effort: Pulmonary effort is normal.   Abdominal:      Palpations: Abdomen is soft. Musculoskeletal:         General: Tenderness present. Skin:     General: Skin is warm.       Capillary Refill: Capillary refill takes less than 2 seconds. Neurological:      General: No focal deficit present. Mental Status: He is alert and oriented to person, place, and time. Psychiatric:         Mood and Affect: Mood normal.         Behavior: Behavior normal.         Imaging Review  Tricompartmental degenerative changes with 80% loss of medial joint space, marginal osteophytes, and subchondral sclerosis without significant cyst formation. Assessment:     Active Problems:    * No active hospital problems. *      Plan:     The various methods of treatment have been discussed with the patient and family. After consideration of risks, benefits and other options for treatment, the patient has consented to surgery.   Questions were answered and Pre-op teaching was done by nurse

## 2022-05-04 NOTE — ANESTHESIA PROCEDURE NOTES
Spinal Block    Start time: 5/4/2022 7:35 AM  End time: 5/4/2022 7:51 AM  Performed by: Indio Diaz MD  Authorized by: Indio Diaz MD     Pre-procedure: Indications: primary anesthetic  Preanesthetic Checklist: risks and benefits discussed, site marked and timeout performed      Spinal Block:   Patient Position:  Seated  Prep Region:  Lumbar  Prep: Betadine and patient draped      Location:  L4-5          Needle:   Needle Type:  Pencil-tip  Needle Gauge:  25 G  Attempts:  1      Events: CSF confirmed, no blood with aspiration and no paresthesia        Assessment:  Insertion:  Uncomplicated  Patient tolerance:  Patient tolerated the procedure well with no immediate complications  Spinal Placement    Patient placed in sitting position, back prepped and draped. Lidocaine infiltrated,   Needle placed. 25 ga 3.5 to hub. Obtained 5 inch. Spinal fluid obtained. Clearing csf  Inject      Good flow before, mid injection and after.

## 2022-05-04 NOTE — PERIOP NOTES
Reviewed PTA medication list with patient/caregiver and patient/caregiver denies any additional medications. Patient admits to having a responsible adult care for them at home for at least 24 hours after surgery. Patient encouraged to use gown warming system and informed that using said warming gown to regulate body temperature prior to a procedure has been shown to help reduce the risks of blood clots and infection. Patient's pharmacy of choice verified and documented in PTA medication section.     Dual skin assessment & fall risk band verification completed with Samia Yarbrough.

## 2022-05-04 NOTE — PROGRESS NOTES
Problem: Mobility Impaired (Adult and Pediatric)  Goal: *Acute Goals and Plan of Care (Insert Text)  Description: Physical Therapy Goals   Initiated 5/4/2022 and to be accomplished within 1-2 day(s)  Pt will be able to perform supine<>sit SBA for home performance at ND. Pt will be able to perform sit<>stand SBA for increased ability to transfer at home safely. Pt will be able to participate in gt training >50' w/RW, WBAT, GB and CGA/SBA for improved functional mobility at d/c. Pt will be able to perform stair training 3 -5 steps using step to pattern, HR rail and CGA for safe home entry at d/c. Pt will be educated regarding HEP per MD protocol for optimal AROM/strength outcomes. Outcome: Progressing Towards Goal  [x]  Patient has met MD mobilization critieria for d/c home   [x]  Recommend HH with 24 hour adult care   []  Benefit from additional acute PT session to address:      PHYSICAL THERAPY EVALUATION    Patient: Jimi Queen (89 y.o. male)  Date: 5/4/2022  Primary Diagnosis: Unilateral primary osteoarthritis, left knee [M17.12]  Procedure(s) (LRB):  LEFT TOTAL KNEE ARTHOPLASTY WITH C-ARM (Left) Day of Surgery   Precautions:  Fall,WBAT  WBAT  PLOF: independent amb PTA    ASSESSMENT :  Based on the objective data described below, the patient presents with decreased ROM/motor performance LLE, decrease functional mobility independence with regard to bed mobility, transfers, and gait following above surgery. Reports pain 2/10 pre, and post session. Pt educated in safe use of RW, and technique for transfers,gait. Pt demonstrates supine >sit SBA and transfers STS with CG/SBA and vc for hand placement. Able to participate in GT/RW/CGA  120 ft in [place] with vc for sequencing and safety. Jamilah slow; progressed to reciprocal gait pattern. Pt educated in step nego and performed same with CGA as noted below. Rested in chair and c/o dizziness. Seated /56, HR 71  (was 130/70, Hr 59 pre session). Returned room and assisted back to bed. Supine /65, HR 75. Also educated in demonstrates HEP accurately as noted below. Pt left supine with SCD's/ice in place, all needs in reach and nurse Armaan Payment made aware of above. Recommend HHPT for follow up physical therapy upon discharge to reach maximal level of independence/safety with functional mobility. Patient education: Mobility safety, WB, HEP, ice application/use, elevation, environmental safety and home safety techniques. Patient will benefit from skilled intervention to address the above impairments. Patient's rehabilitation potential is considered to be Good  Factors which may influence rehabilitation potential include:   []         None noted  []         Mental ability/status  []         Medical condition  []         Home/family situation and support systems  []         Safety awareness  [x]         Pain tolerance/management  []         Other:      PLAN :  Recommendations and Planned Interventions:   [x]           Bed Mobility Training             []    Neuromuscular Re-Education  [x]           Transfer Training                   []    Orthotic/Prosthetic Training  [x]           Gait Training                          []    Modalities  [x]           Therapeutic Exercises           []    Edema Management/Control  [x]           Therapeutic Activities            []    Family Training/Education  [x]           Patient Education  []           Other (comment):    Frequency/Duration: Patient will be followed by physical therapy 1-2 times per day/4-7 days per week to address goals. Discharge Recommendations: Outpatient  Further Equipment Recommendations for Discharge: N/A     SUBJECTIVE:   Patient stated I'm gonna get up?     OBJECTIVE DATA SUMMARY:     Past Medical History:   Diagnosis Date    Arthritis     Enlarged prostate     Frequency of urination 5/19/2016    77 y.o. male who presents today for frequent urination.   Mainly nocturia 3-4 x S/p TURP 2014 - Dr. Camelia Herrera), on Sleep apnea? On Proscar and Uroxatral  For many years  , PSA done by pcp Dr. Thuy Quijano- was okay per patient, FOS decent , Nocturia 3-4 , dysuria n , frequency y , urgency n, hematuria n PSA  3/16  0.11     HTN (hypertension)     Hypercholesteremia     Sleep apnea     Uses cpap- instructed to bring dos    Urinary frequency     UTI (urinary tract infection)      Past Surgical History:   Procedure Laterality Date    HX CATARACT REMOVAL Bilateral 2017    HX KNEE REPLACEMENT Right 2014    HX TURP  2014    Dr Benjamin Velásquez  2014    TURP     Barriers to Learning/Limitations: yes;  physical  Compensate with: Visual Cues, Verbal Cues, and Tactile Cues  Home Situation:  Home Situation  Home Environment: Private residence  # Steps to Enter: 3  Rails to Enter: Yes  Hand Rails : Bilateral (wide)  One/Two Story Residence: Two story  # of Interior Steps: 12  Interior Rails: Right  Lift Chair Available: No  Living Alone: No  Support Systems: Spouse/Significant Other  Patient Expects to be Discharged to[de-identified] Home with family assistance  Current DME Used/Available at Home: Kishor Sample, rolling,Shower chair,CPAP,Raised toilet seat  Tub or Shower Type: Shower  Critical Behavior:  Neurologic State: Alert; Appropriate for age  Orientation Level: Oriented X4  Cognition: Follows commands  Safety/Judgement: Awareness of environment; Fall prevention  Psychosocial  Patient Behaviors: Calm; Cooperative  Family  Behaviors: Calm;Supportive  Skin Condition/Temp: Warm  Family  Behaviors: Calm;Supportive  Skin Integrity: Incision (comment) (post op dressing d/c/i)  Skin Integumentary  Skin Color: Appropriate for ethnicity  Skin Condition/Temp: Warm  Skin Integrity: Incision (comment) (post op dressing d/c/i)  Turgor: Non-tenting  Strength:    Strength: Generally decreased, functional  Tone & Sensation:   Tone: Normal  Sensation: Intact  Range Of Motion:  AROM: Generally decreased, functional L Knee s/p TKR  Functional Mobility:  Bed Mobility:  Supine to Sit: Stand-by assistance  Sit to Supine: Stand-by assistance  Scooting: Supervision  Transfers:  Sit to Stand: Contact guard assistance;Stand-by assistance (vc)  Stand to Sit: Contact guard assistance;Stand-by assistance (vc)  Bed to Chair: Contact guard assistance;Stand-by assistance (vc)  Balance:   Sitting: Intact  Standing: Intact; With support  Ambulation/Gait Training:  Distance (ft): 120 Feet (ft)  Assistive Device: Gait belt;Walker, rolling  Ambulation - Level of Assistance: Contact guard assistance  Gait Description (WDL): Exceptions to WDL  Gait Abnormalities: Decreased step clearance; Step to gait (progressed to reciprocal)  Left Side Weight Bearing: As tolerated  Speed/Jamilah: Pace decreased (<100 feet/min)  Step Length: Right shortened;Left shortened  Swing Pattern: Right asymmetrical;Left asymmetrical  Interventions: Safety awareness training; Tactile cues; Verbal cues; Visual/Demos  Stairs:  Number of Stairs Trained: 3 (2+3+3)  Stairs - Level of Assistance: Contact guard assistance  Rail Use: Both (and R ascending; L descending)  Therapeutic Exercises:   Educated in and performed accurately: AP, LAQ  Pain:  Pain level pre-treatment: 2/10   Pain level post-treatment: 2/10   Pain Intervention(s) : Medication (see MAR); Rest, Ice, Repositioning  Response to intervention: Nurse notified, See doc flow  Activity Tolerance:   Fair   Please refer to the flowsheet for vital signs taken during this treatment. After treatment:   []         Patient left in no apparent distress sitting up in chair  [x]         Patient left in no apparent distress in bed  [x]         Call bell left within reach  [x]         Nursing notified  []         Caregiver present  []         Bed alarm activated  [x]         SCDs applied    COMMUNICATION/EDUCATION:   [x]         Role of Physical Therapy in the acute care setting.   [x]         Fall prevention education was provided and the patient/caregiver indicated understanding. [x]         Patient/family have participated as able in goal setting and plan of care. [x]         Patient/family agree to work toward stated goals and plan of care. []         Patient understands intent and goals of therapy, but is neutral about his/her participation. []         Patient is unable to participate in goal setting/plan of care: ongoing with therapy staff.  []         Other:     Thank you for this referral.  Arsenio Casas, PT   Time Calculation: 46 mins      Eval Complexity: History: HIGH Complexity :3+ comorbidities / personal factors will impact the outcome/ POC Exam:HIGH Complexity : 4+ Standardized tests and measures addressing body structure, function, activity limitation and / or participation in recreation  Presentation: LOW Complexity : Stable, uncomplicated  Clinical Decision Making:Low Complexity    Overall Complexity:LOW

## 2022-05-04 NOTE — PROGRESS NOTES
1113  Pt ax4. ACE bandage to left knee CDI Sensation to BLE pt can not wiggle toes but has feeling. Cap refill less than 3 seconds. Pt had spinal block. Plexis applied bilaterally. VSS. Pt can get 2750 on IS. Callbell and possessions within reach    1124  Bedside and Verbal shift change report given to Caroline So RN (oncoming nurse) by Dara No RN (offgoing nurse). Report included the following information SBAR, Kardex, MAR and Recent Results.

## 2022-05-04 NOTE — PROGRESS NOTES
36  Pt has passed PT, voided post op and seen . Dr Vuong Shown ok with pt d/c.    6470  Dual AVS reviewed with Alexsander Kaufman RN. All medications reviewed individually with patient. Opportunities for questions and concerns provided. Patient to be discharged via (mode of transport ie. Car, ambulance or air transport) car. Patient's arm band appropriately discarded. IV remains until pt ride arrives.      1713  IV removed pt being transported via wheelchair

## 2022-05-04 NOTE — ANESTHESIA POSTPROCEDURE EVALUATION
Procedure(s):  LEFT TOTAL KNEE ARTHOPLASTY WITH C-ARM. regional, spinal    Anesthesia Post Evaluation        Comments: Post-Anesthesia Evaluation and Assessment    Cardiovascular Function/Vital Signs  /65   Pulse 73   Temp 36.3 °C (97.3 °F)   Resp 15   Ht 6' (1.829 m)   Wt 105.8 kg (233 lb 4.8 oz)   SpO2 100%   BMI 31.64 kg/m²     Patient is status post Procedure(s):  LEFT TOTAL KNEE ARTHOPLASTY WITH C-ARM. Nausea/Vomiting: Controlled. Postoperative hydration reviewed and adequate. Pain:  Pain Scale 1: Numeric (0 - 10) (05/04/22 1030)  Pain Intensity 1: 0 (05/04/22 1030)   Managed. Neurological Status:   Neuro (WDL): Exceptions to WDL (05/04/22 1030)   At baseline. Mental Status and Level of Consciousness: Arousable. Pulmonary Status:   O2 Device: None (Room air) (05/04/22 1106)   Adequate oxygenation and airway patent. Complications related to anesthesia: None    Post-anesthesia assessment completed. No concerns. Patient has met all discharge requirements.     Signed By: Krish Smith MD    May 4, 2022                     INITIAL Post-op Vital signs:   Vitals Value Taken Time   /65 05/04/22 1106   Temp 36.3 °C (97.3 °F) 05/04/22 1106   Pulse 73 05/04/22 1106   Resp 15 05/04/22 1106   SpO2 100 % 05/04/22 1106

## 2022-05-04 NOTE — DISCHARGE SUMMARY
Admit date: 5/4/2022   Admitting Provider: Tomasa Nicole MD    Discharge date: 5/4/2022  Discharging Provider: Tomasa Nicole MD      * Admission Diagnoses: Unilateral primary osteoarthritis, left knee [M17.12]    * Discharge Diagnoses:    Hospital Problems as of 5/4/2022 Date Reviewed: 5/4/2022          Codes Class Noted - Resolved POA    RESOLVED: Unilateral primary osteoarthritis, left knee ICD-10-CM: M17.12  ICD-9-CM: 715.16  5/4/2022 - 5/4/2022 Unknown              * Hospital Course: Patient was admitted s/p left TKA. Patient was able to participate in PT on DOS. On DOS patient's pain remained well controlled and patient was able to ambulate with assistance. Patient was able to tolerate a regular diet and was able to void on their own. On exam, patient was alert and oriented x 3. They appeared comfortable. Breathing was non labored. The dressings were clean and dry and there were no focal neurovascular deficits. Discharge instructions were provided. * Procedures:   Procedure(s):  LEFT TOTAL KNEE ARTHOPLASTY WITH C-ARM      Consults: PT/OT    Significant Diagnostic Studies:   Recent Results (from the past 24 hour(s))   TYPE & SCREEN    Collection Time: 05/04/22  6:00 AM   Result Value Ref Range    Crossmatch Expiration 05/07/2022,2359     ABO/Rh(D) O NEGATIVE     Antibody screen NEG    GLUCOSE, POC    Collection Time: 05/04/22  6:14 AM   Result Value Ref Range    Glucose (POC) 95 70 - 110 mg/dL          * Discharge Condition: improved  * Disposition: Home    Discharge Medications:  Current Discharge Medication List      START taking these medications    Details   aspirin delayed-release 325 mg tablet Take 1 Tablet by mouth two (2) times a day for 30 days. Qty: 60 Tablet, Refills: 0  Start date: 5/4/2022, End date: 6/3/2022      docusate sodium (COLACE) 100 mg capsule Take 1 Capsule by mouth two (2) times a day for 14 days.   Qty: 28 Capsule, Refills: 0  Start date: 5/4/2022, End date: 5/18/2022      oxyCODONE-acetaminophen (PERCOCET) 5-325 mg per tablet Take 1 Tablet by mouth every six (6) hours as needed for Pain for up to 7 days. Max Daily Amount: 4 Tablets. Qty: 28 Tablet, Refills: 0  Start date: 5/4/2022, End date: 5/11/2022    Associated Diagnoses: Unilateral primary osteoarthritis, left knee         CONTINUE these medications which have NOT CHANGED    Details   potassium chloride (KLOR-CON) 20 mEq pack Take 20 mEq by mouth every Monday and Thursday. Calcium-Cholecalciferol, D3, 600 mg-10 mcg (400 unit) chew Take 1 Tablet by mouth two (2) times a day. fexofenadine (ALLEGRA) 180 mg tablet Take 180 mg by mouth daily. telmisartan (MICARDIS) 40 mg tablet daily. hydroCHLOROthiazide (HYDRODIURIL) 25 mg tablet 25 mg daily. Indications: high blood pressure      TYLENOL EXTRA STRENGTH 500 mg tablet 1,000 mg as needed. alfuzosin SR (UROXATRAL) 10 mg SR tablet Take  by mouth daily. simvastatin (ZOCOR) 10 mg tablet Take  by mouth nightly. fluticasone (FLONASE) 50 mcg/actuation nasal spray as needed. * Follow-up Care/Patient Instructions:   Activity: Activity as tolerated and no driving for today, No lifting, Driving, or Strenuous exercise for 6 weeks, and PT/OT Eval and Treat  Diet: Regular Diet  Wound Care: Reinforce dressing PRN and As directed    Follow-up Information     Follow up With Specialties Details Why Contact Info    Elma Grover MD Orthopedic Surgery   [address] [facility] [address]  [phone]      OtherAwa MD    Patient can only remember the practice name and not the physician            Signed:  Torsten Araya MD  5/4/2022  1:56 PM

## 2022-05-04 NOTE — PROGRESS NOTES
Reason for Admission:  66 yr old male who has failed OP therapies, admitted for elective left total knee arthroplasty with c-arm on 5/4/22. RUR Score:    Low; 3%                 Plan for utilizing home health:      NA - will be following up outpatient PT through [facility] per patient    PCP: First and Last name:  Other, MD Dr. Avril Billings Free     Name of Practice: [facility]   Are you a current patient: Yes/No: yes   Approximate date of last visit:    Can you participate in a virtual visit with your PCP:                     Current Advanced Directive/Advance Care Plan: Full Code      Healthcare Decision Maker:   Click here to complete [address] including selection of the Healthcare Decision Maker Relationship (ie \"Primary\")           Dejuan Barrera, spouse - Primary - [phone]                  Transition of Care Plan:    Care manager met with patient after returning from PACU; states he lives with his wife of 48 plus years, states he has rolling walker and denied needing any other DME; stated he [facility] to [facility] to [facility]; CMS will verify scheduling. Care Management Interventions  PCP Verified by CM:  Yes  Mode of Transport at Discharge: Self  Transition of Care Consult (CM Consult): Discharge Planning  Support Systems: Spouse/Significant Other  Confirm Follow Up Transport: Family  The Plan for Transition of Care is Related to the Following Treatment Goals : left total knee arthroplasty  The Patient and/or Patient Representative was Provided with a Choice of Provider and Agrees with the Discharge Plan?: Yes  Name of the Patient Representative Who was Provided with a Choice of Provider and Agrees with the Discharge Plan: Tabatha Meng, patient  Discharge Location  Patient Expects to be Discharged to[de-identified] Home with family assistance

## 2022-05-04 NOTE — OP NOTES
Left Total Knee Operative Report      Indications: This is a 66 yrs male who presents with left knee pain and mobility impairment. The patient was admitted for surgery as conservative measures have failed. Date of Surgery: 5/4/2022     Preoperative Diagnosis:  LEFT KNEE OSTEOARTHRITIS    Postoperative Diagnosis: LEFT KNEE OSTEOARTHRITIS    Surgeon: Virgie Boeck, MD    Assistant: Tracy Carrasco DO    Anesthesia: Spinal    Procedure: Procedure(s):  LEFT TOTAL KNEE ARTHOPLASTY WITH C-ARM    Procedure Details:  Compartmented  Carlee Marin was brought to the operating room and positioned on the operating table. He was anethestized with regional adductor canal block and spinal anesthesia. IV antibiotics were administered. A pneumatic tourniquet was placed about the limb and the left leg was prepped and draped in the usual sterile manner. The tourniquet was inflated. An anterior longitudinal incision was accomplished just medial to the tibial tubercle and extending approximal 6 centimeters proximal to the superior pole of the patella. A medial mid vastus incision was performed. The medial capsular flap was elevated around to the insertion of the semimembranous tendon. The patella was everted and the knee flexed and externally rotated. The patella was then everted. The bone was resected to accomodate the pegged size A35 patella button. A trial reduction revealed appropriate tracking through the patellofemoral groove with no lateral retinacular release being accomplished. The distal femur was addressed. A drill hole was made above the intracondylar notch. Using appropriate intramedullary instrumentation,a 6 degree valgus distal cut was accomplished. A femur was sized to a size 6 component. The appropriate cutting blocks were then utilized to perform the anterior chamfer, posterior chamfer and notch cuts, with appropriate lateral tranlation accomplished for the patellofemoral groove.     Using extramedullary instrumentation, the tibial cut was accomplished with appropriate posterior slope. Approximately 9 mm of bone was removed from the high side of the tibia. The medial and external menisci were excised. The lateral half of the fat pad excised and the patella femoral ligament was released. The anterior cruciate ligament was resected and the posterior cruciate ligament was substituted. The osteophytes were removed from the tibial and femoral surfaces. The flexion and extension gaps were assessed with the appropriate spacer blocks. The flexion and extension gaps were deemed appropriately balanced. The tibia was sized to a 6 component. The tibial base plate was pinned into place with the appropriate external rotation and stem site prepared. A preliminary range of motion was accomplished with the above size trial components. A  9 millimeter polyethylene insert allowed the patient to obtain full extension as well as appropriate flexion. The patient's ligaments were stable in flexion and extension to medial and lateral stressing and the alignment was through the appropriate mechanical axis. All trial components were removed and the cut surfaces prepared for cementing with irrigation and debridement of the bone interstices. There were no femoral deficiencies. There were no tibial deficiencies. No augmentation was utilized. One package of cement impregnated with was mixed and the permanent components cemented into place. The femur and tibia were press fit and the patella was cemented. Excess cement was removed using a freer elevator. Once the cement was hardened, the patella clamp was removed and the knee was copiously irrigated with pulse lavage saline and 5% Betadyne solution. [facility] knee was placed through range of motion and noted to be stable as mentioned above with the trail components. The tourniquet was released.   Direct pressure and Bovie cautery were used to achieve hemostasis. No drain was used. The operative knee was injected with 30cc's of 0.25% Marcaine with 1 cc epinephrine 1:1000, 1 cc of toradol 30 mg/ml, 20 cc of Exparel, and 8 cc of injectable saline. The capsular layer was closed using a #1 Stratofix suture, while subcutaneous layers were closed using 2-0 Vicryl interrupted sutures. Finally the skin was closed using Skin staples, which were applied in occlusive fashion and sterile bandage applied. Sponge count and needle counts were correct. Cartilage Findings: Full thickness lesion of medial compartment    Tourniquet Time:   Total Tourniquet Time Documented:  Thigh (Left) - 60 minutes  Total: Thigh (Left) - 60 minutes       Components: See below    Implants:   Implant Name Type Inv.  Item Serial No.  Lot No. LRB No. Used Action   CEMENT BNE 20GM HALF DOSE PMMA W/ GENT HI VISC RADPQ FAST - [id number]  CEMENT BNE 20GM HALF DOSE PMMA W/ GENT HI VISC RADPQ FAST  [employer]_ [id number] Left 1 Implanted   PAT ASYM TRIATHLN X3 [id number] -- TRIATHLON ASYMMETRIC X3 - [id number]  PAT ASYM TRIATHLN X3 [id number] -- TRIATHLON ASYMMETRIC X3  [employer] [id number] Left 1 Implanted   INSERT TIB CNDYL STBL 6 9 MM KNEE 5/PK X3 TRIATHLON - [id number]  INSERT TIB CNDYL STBL 6 9 MM KNEE 5/PK X3 TRIATHLON  [employer] [id number] Left 1 Implanted   COMPONENT FEM SZ 6 L KNEE CRUCE RET CEMENTLESS BEAD W/ EDWARD - [id number]  COMPONENT FEM SZ 6 L KNEE CRUCE RET CEMENTLESS BEAD W/ EDWARD  [employer]WD [id number] Left 1 Implanted   BASEPLATE TIB SZ 6 [id number] ML77MM KNEE TRITANIUM 4 CRUCFRM - [id number]  BASEPLATE TIB SZ 6 [id number] ML77MM KNEE TRITANIUM 4 CRUCFRM  [employer] HOW_[employer] [id number] Left 1 Implanted           Findings: severe medial compartment OA     Estimated Blood Loss:   100 cc         Drains: NA     Range of Motion: 0-135 deg           Specimens:  None     Complications: None

## 2022-05-04 NOTE — ANESTHESIA PROCEDURE NOTES
Adductor    Start time: 2022 7:17 AM  End time: 2022 7:24 AM  Performed by: Fareed Casey MD  Authorized by: Fareed Casey MD       Pre-procedure: Indications: at surgeon's request, post-op pain management and procedure for pain    Preanesthetic Checklist: patient identified, risks and benefits discussed, site marked, timeout performed, anesthesia consent given and patient being monitored      Block Type:   Block Type: Adductor canal block  Laterality:  Left  Monitoring:  Standard ASA monitoring, continuous pulse ox, frequent vital sign checks, heart rate, responsive to questions and oxygen  Injection Technique:  Single shot  Procedures: ultrasound guided    Patient Position: supine  Prep: chlorhexidine    Needle Type:  Stimuplex  Needle Gauge:  22 G  Medication Injected:  Ropivacaine (PF) (NAROPIN) 5 mg/mL (0.5 %) injection, 30 mL  dexmedeTOMidine (PRECEDEX) 100 mcg/mL iv solution, 25 mcg  dexamethasone (PF) (DECADRON) 10 mg/mL injection, 4 mg    Assessment:  Number of attempts:  1  Injection Assessment:  Incremental injection every 5 mL, local visualized surrounding nerve on ultrasound, negative aspiration for CSF, negative aspiration for blood, no paresthesia, no intravascular symptoms and ultrasound image on chart  Patient tolerance:  Patient tolerated the procedure well with no immediate complications  Dontae Kaminski   = [id number]  CSN = [id number]    Femoral nerve on left identified by ultrasound proximal adductor canal.    90 mm Stimuplex Ultra. Local anesthetic injected without resistance and with gentle aspiration every 3-5 ml with direct visualization with ultrasound     Patient tolerated procedure well, vital signs stable throughout, with no apparent complications. Entire procedure completed prior to start of anesthesia time.        Dontae Boswellir   = [id number]  CSN = [id number]

## 2022-05-04 NOTE — PROGRESS NOTES
5/4/2022 PT note: consult received and chart reviewed. RN note noted re: pt s/p spinal block and unable to wiggle toes at this time, though sensation present. Will f/u at later time for PT evaluation after full motor return.   Thank you for this referral.   Morris Cooks, PT

## 2022-05-04 NOTE — ROUTINE PROCESS
TRANSFER - IN REPORT:    Verbal report received from K Friday rn(name) on Rehan Romero  being received from PACU(unit) for routine post - op      Report consisted of patients Situation, Background, Assessment and   Recommendations(SBAR). Information from the following report(s) SBAR, Kardex, [facility] and Recent Results was reviewed with the receiving nurse. Opportunity for questions and clarification was provided. Assessment completed upon patients arrival to unit and care assumed.

## 2022-05-04 NOTE — NURSE NAVIGATOR
Gretel Galvez Rounded on post total knee replacement. Patient educated: Activity:   OOB for all meals,   Walk short distances every hour during the day and evening to promote circulation, help move better and lessen stiffness. Also helps to get the muscles stretched and strong. When elevating leg and sitting do not put anything under knee. IT is important to work on getting the leg stretched out and as straight as possible. Promoting circulation  Ankle pumps 10 times an hour at hospital & home. Take medications as prescribed by provider. Pain Control:  Pain medications side effects of constipation, nausea, dizziness, itching reviewed. Reminded patient swelling, bruising and increased pain are normal at home. To help decrease swelling after surgery it is safe to lie down and elevate legs above heart to help decrease swelling. Use pillows while keeping the surgical knee straight when elevating. Use ice, distraction, moving, & change position to help with pain. Rest between activity. Educated that medications can cause nausea and decreased appetite so eat a snack before taking medication. Narcotics cause constipation so take stool softener/mild laxative daily while on narcotics. Incentive Spirometry:    Use of incentive spirometer 10 x/hr. Diet:   Eat for healing. Drink plenty of fluids so urine is lemonade in color. Patient Safety:   Call light & belongings in reach. Call for help when want to walk or get OOB. Gretel Galvez verbalized understand. Given the opportunity for asking questions.       Nurse Navigator

## 2022-05-05 ENCOUNTER — TELEPHONE (OUTPATIENT)
Dept: OTHER | Age: 72
End: 2022-05-05

## 2022-05-05 NOTE — TELEPHONE ENCOUNTER
Post Discharge Phone placed after Joint Replacement surgery   Spoke with Tina Banerjee    Pain management reviewed:     Reviewed pain medications. Patient states swelling is manageable. Patient reminded to elevate leg above heart level and use ice to help with swelling and pain relief. Reviewed walking every hour while awake   Going to OP therapy. Appetite is good. Reviewed the importance of eating  healthy to heal and have energy. Bowels have moved. Patient states dressing is intact without drainage.    Denies any unusual symptoms  Follow up appointment is scheduled with surgeon   Opportunity given for patient to ask questions

## 2022-05-06 ENCOUNTER — HOSPITAL ENCOUNTER (OUTPATIENT)
Dept: PHYSICAL THERAPY | Age: 72
Discharge: HOME | End: 2022-05-06
Payer: MEDICARE

## 2022-05-06 PROCEDURE — 97530 THERAPEUTIC ACTIVITIES: CPT

## 2022-05-06 PROCEDURE — 97112 NEUROMUSCULAR REEDUCATION: CPT

## 2022-05-06 PROCEDURE — 97016 VASOPNEUMATIC DEVICE THERAPY: CPT

## 2022-05-06 PROCEDURE — 97110 THERAPEUTIC EXERCISES: CPT

## 2022-05-06 NOTE — PROGRESS NOTES
PT DAILY TREATMENT NOTE    Patient Name: Mallory Massey  Date: 2022   : 1950  [x]  Patient  Verified  Payor: Severiano Quinton / Plan: [place] / Product Type: Medicare /    In Time: 1:08  Out Time: 2:34  Total Treatment Time (min): 86  Total Timed Codes (min): 76   1:1 Treatment Time ( [address] only): 76   Visit #:     Treatment Dx: Left knee pain [M25.562]    SUBJECTIVE  Pre-Treatment Pain Level (0-10 scale): 6-7 with narcotics    Any medication changes, allergies to medications, adverse drug reactions, diagnosis change, or new procedure performed?: [x] No    [] Yes (see summary sheet for update)    Subjective Functional Status/Changes:  [] No changes reported  Patient returns to skilled PT 2 days s/p left TKR performed on 22 by Dr. Carlo Cardozo. Patient states his surgery was performed as an outpatient procedure, so he has been home since this past Wednesday. Patient reports his pain has been \"pretty high\", but he has been taking narcotic medications as prescribed to assist with pain relief, in addition to icing and elevating. Patient states he's been performing \"some but not all\" of his HEP that were prescribed to him by this therapist pre-operatively.      OBJECTIVE    46 min Therapeutic Exercise:  [x] See flow sheet   Rationale: increase ROM, increase strength, and decrease pain to assist in improving patient's ability to perform functional activities and ADLs; visual assessment of incision and rewrapped ace bandage to include use of new sterile ABD pad    15 min Therapeutic Activity:  [x] See flow sheet   Rationale: increase ROM, increase strength, and improve coordination to assist in improving patient's ability to perform functional activities and ADLs    15 min Neuromuscular Re-Education:  [x] See flow sheet   Rationale: improve proprioception and improve quad facilitation without compensatory techniques to achieve improved quad strength during standing/walking and during functional activities    Modalities Rationale:     to decrease pain and decrease edema to improve QOL and improve patient's ability to perform ADLs   min [] Estim, type/location:                                      []  att     []  unatt     []  w/US     []  w/ice    []  w/heat    min []  Mechanical Traction: type/lbs                   []  pro   []  sup   []  int   []  cont    []  before manual    []  after manual    min []  Ultrasound, settings/location:      min []  Iontophoresis w/ dexamethasone, location:                                               []  take home patch       []  in clinic    min []  Ice     []  Heat    location/position:    10 min [x]  Vasopneumatic Device, press/temp: Mod pressure/38-40 deg   If using vaso (only need to measure limb vaso being performed on)      pre-treatment girth (in cm) : 46.5      post-treatment girth (in cm) : 46.1      measured at (landmark location) :  Mid-patella (with ace bandage on)    min []  Other:    [x] Skin Assessment Post-Treatment (if applicable):   Did not assess as patient has ace bandage on  []  intact    []  redness - no adverse reaction                 []  redness - adverse reaction:       With   [x] TE   [] TA   [] neuro   [] other: Patient Education: [x] Review HEP    [] Progressed/Changed HEP based on:   [] positioning   [] body mechanics   [] transfers   [] heat/ice application    [] other:      Other Objective/Functional Measures:   Girth Measurements (in cm) taken at mid-patella:  Right: 42.0cm, Left: 46.5cm (with ace bandage on)    See below for further objective information    Pain Level (0-10 scale) Post Treatment: 5    ASSESSMENT/Changes in Function:  Patient responded well to today's treatment without any adverse reactions. Patient presents to skilled PT 2 days s/p left TKR performed by Dr. Manuel Irizarry on 5/4/22. Patient presents with 7-87 deg of AROM and 5-93 deg of AAROM as well as deficits, as expected post-operatively, in left LE strength.   Patient has mild atrophy of his left quad which is contributing to his lack of ability to raise his left LE up onto the table without assistance from his contralateral LE; however, he does have decent firing of his left quad during quad sets. Patient is limited in all aspects of ADL's and functional limitations secondary to limited AROM, strength, and abnormal gait mechanics, but is an excellent candidate for physical therapy interventions. Recommend skilled PT 3x/week x 8 weeks. Patient will continue to benefit from skilled PT services to further modify and progress therapeutic interventions, address functional mobility deficits, address ROM deficits, address strength deficits, analyze and address soft tissue restrictions, analyze and cue movement patterns, analyze and modify body mechanics/ergonomics, assess and modify postural abnormalities, and instruct in home and community integration to attain remaining goals. [x]  See Plan of Care  [x]  See Progress Note/Recertification  []  See Discharge Summary         Progress Towards Goals/Updated Goals:    Short Term Goals:    to be accomplished within 4 weeks:     Patient will report compliance with prescribed HEP(s) at least 1x/day in order to assist in maximizing therapeutic gains, reduce symptoms, and to progress towards overall prior function. Pre-Op: HEP issued and reviewed with patient, patient also educated in edema reduction techniques   Post-Op Reassess: Patient states he's been icing and elevating, but hasn't yet performed all of his HEP exercises. Patient given another copy of his HEP today and was advised to please perform 3x/day to promote further ROM/strength/function. 5/6/22   Current: same as above    Patient will demonstrate at least 5-100 deg of left knee AROM post-operatively in order to return to prior functional activities and mobility.   Pre-Op AROM: 8-125 deg  Post-Op Reassess:  AROM: 7-87 deg; AAROM: 5-93 deg      5/6/22  Current: same as above        Long Term Goals:     to be accomplished within 9 weeks:     Patient will score at least 64 points on FOTO in order to maximize function and promote patient satisfaction with overall outcome. (Hyla Muta is an established functional score where 100 = no disability)  Pre-Op FOTO: 52   Post-Op Reassess: 31   5/6/22   Current: same as above     Patient will report less than 2/10 pain during all functional activities and ADLs in order to improve QOL and return to patient's PLOF. Post-Op Reassess: to be completed s/p surgery  Post-Op Reassess:  6-7/10 pain currently, with narcotics      5/6/22   Current: same as above     Patient will demonstrate functional and painfree AROM of at least 0-120 deg in order to return to prior functional activities and mobility. Pre-Op AROM: 8-125 deg  Post-Op Reassess:  AROM: 7-87 deg; AAROM: 5-93 deg      5/6/22  Current: same as above     Patient will demonstrate at least 5/5 strength bilaterally in order to be able to safely perform functional activities and demonstrate improved stability and strength. Pre-Op Strength: Grossly 5/5 bilaterally except bilateral hip flexion 4+/5  Post-Op Reassess:  (+) extensor lag with mild atrophy of left quad, did not formally test other musculature (will at later date once better AROM is achieved)      5/6/22  Current: same as above     Patient will be able to safely ambulate community distances without and normal gait mechanics in order to improve overall mobility and return patient to his or her PLOF.   Eval: mildly antalgic, lacking bilateral TKE prior to heel strike (pre-op)  Post-Op Reassess:  With RW: stiff knee gait pattern including impaired heel/toe mechanics, lacking TKE prior to heel strike, decreased knee flexion during push off, and decrease knee flexion during swing, slow gait speed, WBOS     5/6/22  Current: same as above     Patient will be able to safely negotiate a full flight of stairs with a step-through pattern while holding onto at least one handrail in order to return to normal with this functional activity and improve safety on stairs. Eval: FF step-through pattern (pre-op)  Current: same as above    PLAN  []  Upgrade Activities as Tolerated     [x]  Continue Plan of Care: 3x/week x 8 weeks  []  Update Interventions per Flow Sheet       []  Discharge Due To:_  []  Other:_      Adrianna Labs.  Suman PT, DPT, ATR  5/6/2022 8:06 AM

## 2022-05-06 NOTE — PROGRESS NOTES
[facility]  [address], [address] [facility] [place]  Ph [phone]  Fx [phone]    Plan of Care/ Statement of Necessity for Physical Therapy Services    Patient name: Tina Banerjee Start of Care: 2022   Referral source: Brent Adan MD : [id number]               Medical Diagnosis: Left knee pain [M25.562]    Onset Date:2022               Treatment Diagnosis: left knee pain with planned left TKR on 22                                              ICD-10: Right knee pain [M25.561]   Prior Hospitalization: see medical history Provider#: [id number]   Medications: Verified on Patient summary List    Comorbidities: s/p right TKR in , cataract surgery, facial cyst removal, prostate issues (no cancer), hypertension, high cholesterol, sleep apnea  Substance Use: []?? tobacco use, []?? alcohol use, []?? other:   Prior Level of Function:    [x]? ? Unrestricted with functional activities and ADLs  [x]? ? No assistive device  [x]? ? active lifestyle, []?? moderately active lifestyle, []?? sedentary lifestyle  Patients Goals:  \"to get back to everything I was doing before, without pain\"      Visits from Start of Care: 2    Missed Visits: 0    ASSESSMENT/Changes in Function:  Patient responded well to today's treatment without any adverse reactions. Patient presents to skilled PT 2 days s/p left TKR performed by Dr. Kathya Tapia on 22. Patient presents with 7-87 deg of AROM and 5-93 deg of AAROM as well as deficits, as expected post-operatively, in left LE strength. Patient has mild atrophy of his left quad which is contributing to his lack of ability to raise his left LE up onto the table without assistance from his contralateral LE; however, he does have decent firing of his left quad during quad sets.   Patient is limited in all aspects of ADL's and functional limitations secondary to limited AROM, strength, and abnormal gait mechanics, but is an excellent candidate for physical therapy interventions. Recommend skilled PT 3x/week x 8 weeks. Patient will continue to benefit from skilled PT services to further modify and progress therapeutic interventions, address functional mobility deficits, address ROM deficits, address strength deficits, analyze and address soft tissue restrictions, analyze and cue movement patterns, analyze and modify body mechanics/ergonomics, assess and modify postural abnormalities, and instruct in home and community integration to attain remaining goals. Progress Towards Goals/Updated Goals:     Short Term Goals:    to be accomplished within 4 weeks:     Patient will report compliance with prescribed HEP(s) at least 1x/day in order to assist in maximizing therapeutic gains, reduce symptoms, and to progress towards overall prior function. Pre-Op: HEP issued and reviewed with patient, patient also educated in edema reduction techniques              Post-Op Reassess: Patient states he's been icing and elevating, but hasn't yet performed all of his HEP exercises. Patient given another copy of his HEP today and was advised to please perform 3x/day to promote further ROM/strength/function. 5/6/22              Current: same as above     Patient will demonstrate at least 5-100 deg of left knee AROM post-operatively in order to return to prior functional activities and mobility. Pre-Op AROM: 8-125 deg  Post-Op Reassess:  AROM: 7-87 deg; AAROM: 5-93 deg      5/6/22  Current: same as above        Long Term Goals:     to be accomplished within 9 weeks:     Patient will score at least 64 points on FOTO in order to maximize function and promote patient satisfaction with overall outcome.   (Ollen Chard is an established functional score where 100 = no disability)  Pre-Op FOTO: 52              Post-Op Reassess: 31   5/6/22              Current: same as above     Patient will report less than 2/10 pain during all functional activities and ADLs in order to improve QOL and return to patient's PLOF. Post-Op Reassess: to be completed s/p surgery  Post-Op Reassess:  6-7/10 pain currently, with narcotics      5/6/22              Current: same as above     Patient will demonstrate functional and painfree AROM of at least 0-120 deg in order to return to prior functional activities and mobility. Pre-Op AROM: 8-125 deg  Post-Op Reassess:  AROM: 7-87 deg; AAROM: 5-93 deg      5/6/22  Current: same as above     Patient will demonstrate at least 5/5 strength bilaterally in order to be able to safely perform functional activities and demonstrate improved stability and strength.              Pre-Op Strength: Grossly 5/5 bilaterally except bilateral hip flexion 4+/5  Post-Op Reassess:  (+) extensor lag with mild atrophy of left quad, did not formally test other musculature (will at later date once better AROM is achieved)      5/6/22  Current: same as above     Patient will be able to safely ambulate community distances without and normal gait mechanics in order to improve overall mobility and return patient to his or her PLOF. Eval: mildly antalgic, lacking bilateral TKE prior to heel strike (pre-op)  Post-Op Reassess:  With RW: stiff knee gait pattern including impaired heel/toe mechanics, lacking TKE prior to heel strike, decreased knee flexion during push off, and decrease knee flexion during swing, slow gait speed, WBOS     5/6/22  Current: same as above     Patient will be able to safely negotiate a full flight of stairs with a step-through pattern while holding onto at least one handrail in order to return to normal with this functional activity and improve safety on stairs.   Eval: FF step-through pattern (pre-op)  Current: same as above      ASSESSMENT/RECOMMENDATIONS:  [x]Continue therapy per initial plan/protocol at a frequency of  3 x per week for 8 weeks  []Continue therapy with the following recommended changes:_____________________ _____________________________ ________________________________________  []Discontinue therapy progressing towards or have reached established goals  []Discontinue therapy due to lack of appreciable progress towards goals  []Discontinue therapy due to lack of attendance or compliance  []Await Physician's recommendations/decisions regarding therapy  []Other:________________________________________________________________    Thank you for this referral.   Sheba Garcia, PT 5/6/2022 2:42 PM    ________________________________________________________________________     I certify that the above Therapy Services are being furnished while the patient is under my care.  I agree with the treatment plan and certify that this therapy is necessary.     Physician's Signature:_____________________Date:____________TIME:________  Debbie Beckett MD        ** Signature, Date and Time must be completed for valid certification **  Please sign and return to [facility]  [address], [address]  Ph [phone]  Fx [phone]

## 2022-05-10 ENCOUNTER — HOSPITAL ENCOUNTER (OUTPATIENT)
Dept: PHYSICAL THERAPY | Age: 72
Discharge: HOME | End: 2022-05-10
Payer: MEDICARE

## 2022-05-10 PROCEDURE — 97110 THERAPEUTIC EXERCISES: CPT

## 2022-05-10 PROCEDURE — 97016 VASOPNEUMATIC DEVICE THERAPY: CPT

## 2022-05-10 PROCEDURE — 97530 THERAPEUTIC ACTIVITIES: CPT

## 2022-05-10 NOTE — PROGRESS NOTES
PT DAILY TREATMENT NOTE    Patient Name: Mary Mukherjee  Date: 5/10/2022  : 1950  [x]  Patient  Verified  Payor: Brittani Richard / Plan: [place] & [place] / Product Type: Medicare /    In Time: 10:10  Out Time: 11:20  Total Treatment Time (min): 70  Total Timed Codes (min): 60  1:1 Treatment Time ([facility] only): 60   Visit #: 3/25    Treatment Dx: Left knee pain [M25.562]    SUBJECTIVE  Pre-Treatment Pain Level (0-10 scale): 3    Any medication changes, allergies to medications, adverse drug reactions, diagnosis change, or new procedure performed?: [x] No    [] Yes (see summary sheet for update)    Subjective Functional Status/Changes:  [] No changes reported  Patient reports performing his HEP 3x/day. He also states he has stopped taking his narcotic pain medication during the day, but still takes it at nighttime to assist in improved sleep with less pain. Patient also states he will not be able to attend therapy this Friday because his wife is having a medical procedure completed.     OBJECTIVE    45 min Therapeutic Exercise:  [x] See flow sheet   Rationale: increase ROM, increase strength, and decrease pain to assist in improving patient's ability to perform functional activities and ADLs    15 min Therapeutic Activity:  [x] See flow sheet   Rationale: increase ROM, increase strength, and improve coordination to assist in improving patient's ability to perform functional activities and ADLs    Modalities Rationale:     to decrease pain and decrease edema to improve QOL and improve patient's ability to perform ADLs   min [] Estim, type/location:                                      []  att     []  unatt     []  w/US     []  w/ice    []  w/heat    min []  Mechanical Traction: type/lbs                   []  pro   []  sup   []  int   []  cont    []  before manual    []  after manual    min []  Ultrasound, settings/location:      min []  Iontophoresis w/ dexamethasone, location: []  take home patch       []  in clinic    min []  Ice     []  Heat    location/position:    10 min [x]  Vasopneumatic Device, press/temp: Mod pressure/38-40 deg   If using vaso (only need to measure limb vaso being performed on)      pre-treatment girth (in cm) : 47.3      post-treatment girth (in cm) : 45.9      measured at (landmark location) :  Mid-patella    min []  Other:    [x] Skin Assessment Post-Treatment (if applicable):    [x]  intact    [x]  redness  no adverse reaction                 []  redness  adverse reaction:       With   [x] TE   [] TA   [] neuro   [] other: Patient Education: [x] Review HEP    [] Progressed/Changed HEP based on:   [] positioning   [] body mechanics   [] transfers   [] heat/ice application    [] other:      Other Objective/Functional Measures: N/A     Pain Level (0-10 scale) Post Treatment: 1-2    ASSESSMENT/Changes in Function:  Patient responded well to today's treatment without any adverse reactions. Patient presented to therapy with his ace bandage more distally along his left LE and also with a more edematous left LE as compared to his contralateral.  Ace wrap was removed, incision was without signs/symptoms of infection and patient was with (-) Jose's test.  Ace wrap was reapplied from distal to proximal with use of sterile ABD along incision. Patient stated he was already taking aspirin for DVT prevention, but he was also educated on the signs/symptoms of blood clots and advised to contact MD should any occur. Patient appears to be progressing well with both flexion and extension of his left LE, continue per POC.      Patient will continue to benefit from skilled PT services to further modify and progress therapeutic interventions, address functional mobility deficits, address ROM deficits, address strength deficits, analyze and address soft tissue restrictions, analyze and cue movement patterns, analyze and modify body mechanics/ergonomics, assess and modify postural abnormalities, and instruct in home and community integration to attain remaining goals. [x]  See Plan of Care  []  See Progress Note/Recertification  []  See Discharge Summary         Progress Towards Goals/Updated Goals:  Short Term Goals:    to be accomplished within 4 weeks:     Patient will report compliance with prescribed HEP(s) at least 1x/day in order to assist in maximizing therapeutic gains, reduce symptoms, and to progress towards overall prior function. Pre-Op: HEP issued and reviewed with patient, patient also educated in edema reduction techniques              Post-Op Reassess: Patient states he's been icing and elevating, but hasn't yet performed all of his HEP exercises.  Patient given another copy of his HEP today and was advised to please perform 3x/day to promote further ROM/strength/function.     5/6/22              Current: Patient reports compliance with his HEP 3x/day, in addition to walking around his home with his FWW several times/day. 5/10/22, in progress     Patient will demonstrate at least 5-100 deg of left knee AROM post-operatively in order to return to prior functional activities and mobility. Pre-Op AROM: 8-125 deg  Post-Op Reassess:  AROM: 7-87 deg; AAROM: 5-93 deg      5/6/22  Current: same as above        Long Term Goals:     to be accomplished within 9 weeks:     Patient will score at least 64 points on FOTO in order to maximize function and promote patient satisfaction with overall outcome. (Antonetta Sloop is an established functional score where 100 = no disability)  Pre-Op FOTO: 52              Post-Op Reassess: 31   5/6/22              Current: same as above     Patient will report less than 2/10 pain during all functional activities and ADLs in order to improve QOL and return to patient's PLOF.   Post-Op Reassess: to be completed s/p surgery  Post-Op Reassess:  6-7/10 pain currently, with narcotics      5/6/22              Current: same as above     Patient will demonstrate functional and painfree AROM of at least 0-120 deg in order to return to prior functional activities and mobility. Pre-Op AROM: 8-125 deg  Post-Op Reassess:  AROM: 7-87 deg; AAROM: 5-93 deg      5/6/22  Current: same as above     Patient will demonstrate at least 5/5 strength bilaterally in order to be able to safely perform functional activities and demonstrate improved stability and strength.              Pre-Op Strength: Grossly 5/5 bilaterally except bilateral hip flexion 4+/5  Post-Op Reassess:  (+) extensor lag with mild atrophy of left quad, did not formally test other musculature (will at later date once better AROM is achieved)      5/6/22  Current: same as above     Patient will be able to safely ambulate community distances without and normal gait mechanics in order to improve overall mobility and return patient to his or her PLOF. Eval: mildly antalgic, lacking bilateral TKE prior to heel strike (pre-op)  Post-Op Reassess:  With RW: stiff knee gait pattern including impaired heel/toe mechanics, lacking TKE prior to heel strike, decreased knee flexion during push off, and decrease knee flexion during swing, slow gait speed, WBOS     5/6/22  Current: same as above     Patient will be able to safely negotiate a full flight of stairs with a step-through pattern while holding onto at least one handrail in order to return to normal with this functional activity and improve safety on stairs. Eval: FF step-through pattern (pre-op)  Current: same Micaela Lee      PLAN  []  Upgrade Activities as Tolerated     [x]  Continue Plan of Care  []  Update Interventions per Flow Sheet       []  Discharge Due To:_  []  Other:_      Alex Blancas.  ELIZABETH Will, DPT, [address] [facility]  5/10/2022 8:41 AM    Future Appointments   Date Time Provider Quincy Mari   5/10/2022 10:15 AM Jolene Le [facility]   5/11/2022 10:15 AM Alex Will PT [facility]   5/13/2022 10:15 AM Alex Will, [facility]   5/16/2022 10:15 AM [facility]   5/20/2022 10:15 AM Alexa Rao [facility]

## 2022-05-11 ENCOUNTER — TELEPHONE (OUTPATIENT)
Dept: PHYSICAL THERAPY | Age: 72
End: 2022-05-11

## 2022-05-11 ENCOUNTER — HOSPITAL ENCOUNTER (OUTPATIENT)
Dept: PHYSICAL THERAPY | Age: 72
Discharge: HOME | End: 2022-05-11
Payer: MEDICARE

## 2022-05-11 PROCEDURE — 97530 THERAPEUTIC ACTIVITIES: CPT

## 2022-05-11 PROCEDURE — 97016 VASOPNEUMATIC DEVICE THERAPY: CPT

## 2022-05-11 PROCEDURE — 97535 SELF CARE MNGMENT TRAINING: CPT

## 2022-05-11 PROCEDURE — 97110 THERAPEUTIC EXERCISES: CPT

## 2022-05-11 NOTE — PROGRESS NOTES
PT DAILY TREATMENT NOTE    Patient Name: Agnes Barrett  Date:2022  : 1950  [x]  Patient  Verified  Payor: Helenajulieth Wendie / Plan: [place] MEDICARE PART A & B / Product Type: Medicare /    In time:3:30  Out time:4:25  Total Treatment Time (min): 55  Total Timed Codes (min): 45  1:1 Treatment Time ([facility] only): 45  Visit #:  of     Treatment Dx: Left knee pain [M25.562]    SUBJECTIVE  Pain Level (0-10 scale): 410  Any medication changes, allergies to medications, adverse drug reactions, diagnosis change, or new procedure performed?: [x] No    [] Yes (see summary sheet for update)  Subjective functional status/changes:   [] No changes reported  \"It is just really painful. \"    OBJECTIVE    Modalities Rationale:     decrease edema, decrease inflammation and decrease pain to improve patient's ability to return to prior level of physical activity. 10 min [x]  Vasopneumatic Device, press/temp: Med/low   If using vaso (only need to measure limb vaso being performed on)      pre-treatment girth : 44 cm      post-treatment girth : 42.5 cm      measured at (landmark location) :  Mid-patella   [x] Skin assessment post-treatment (if applicable):    [x]  intact    []  redness- no adverse reaction                  []redness  adverse reaction:            25 min Therapeutic Exercise:  [x] See flow sheet :   Rationale: increase ROM, increase strength and improve coordination to improve the patients ability to return to prior level of physical activity. 12 min Therapeutic Activity:  [x]  See flow sheet :   Rationale: increase ROM, increase strength and improve coordination  to improve the patients ability to return to prior level of physical activity. 8 min Self Care: Reviewed and educated pt with Ice and elevation usage   Rationale:    increase ROM, increase strength and improve coordination to improve the patients ability to return to prior level of physical activity.            With   [] TE   [] TA   [] neuro   [] other: Patient Education: [x] Review HEP    [] Progressed/Changed HEP based on:   [] positioning   [] body mechanics   [] transfers   [] heat/ice application    [] other:      Other Objective/Functional Measures:      Pain Level (0-10 scale) post treatment: 4/10    ASSESSMENT/Changes in Function: Pt arrived reporting pain in knee. PTA assessed incision and found swelling and moderate redness but no increase from previous visits with DPT. Pt was able to tolerated therex but, did report increased pain and fatigue. Pt demonstrated good stair ambulation but, required UE support. Pt received VASO post tx for reduction of swelling and pain. Patient will continue to benefit from skilled PT services to modify and progress therapeutic interventions, address functional mobility deficits, address ROM deficits, address strength deficits, analyze and address soft tissue restrictions, analyze and cue movement patterns, analyze and modify body mechanics/ergonomics and assess and modify postural abnormalities to attain remaining goals. [x]  See Plan of Care  []  See progress note/recertification  []  See Discharge Summary         Progress towards goals / Updated goals:  Short Term Goals:    to be accomplished within 4 weeks:     Patient will report compliance with prescribed HEP(s) at least 1x/day in order to assist in maximizing therapeutic gains, reduce symptoms, and to progress towards overall prior function. Pre-Op: HEP issued and reviewed with patient, patient also educated in edema reduction techniques              Post-Op Reassess: Patient states he's been icing and elevating, but hasn't yet performed all of his HEP exercises.  Patient given another copy of his HEP today and was advised to please perform 3x/day to promote further ROM/strength/function.     5/6/22              Current: Patient reports compliance with his HEP 3x/day, in addition to walking around his home with his FWW several times/day. 5/10/22, in progress     Patient will demonstrate at least 5-100 deg of left knee AROM post-operatively in order to return to prior functional activities and mobility. Pre-Op AROM: 8-125 deg  Post-Op Reassess:  AROM: 7-87 deg; AAROM: 5-93 deg      5/6/22  Current: same as above        Long Term Goals:     to be accomplished within 9 weeks:     Patient will score at least 64 points on FOTO in order to maximize function and promote patient satisfaction with overall outcome. (Ammy Sickle is an established functional score where 100 = no disability)  Pre-Op FOTO: 52              Post-Op Reassess: 31   5/6/22              Current: same as above     Patient will report less than 2/10 pain during all functional activities and ADLs in order to improve QOL and return to patient's PLOF. Post-Op Reassess: to be completed s/p surgery  Post-Op Reassess:  6-7/10 pain currently, with narcotics      5/6/22              Current: same as above     Patient will demonstrate functional and painfree AROM of at least 0-120 deg in order to return to prior functional activities and mobility. Pre-Op AROM: 8-125 deg  Post-Op Reassess:  AROM: 7-87 deg; AAROM: 5-93 deg      5/6/22  Current: same as above     Patient will demonstrate at least 5/5 strength bilaterally in order to be able to safely perform functional activities and demonstrate improved stability and strength.              Pre-Op Strength: Grossly 5/5 bilaterally except bilateral hip flexion 4+/5  Post-Op Reassess:  (+) extensor lag with mild atrophy of left quad, did not formally test other musculature (will at later date once better AROM is achieved)      5/6/22  Current: same as above     Patient will be able to safely ambulate community distances without and normal gait mechanics in order to improve overall mobility and return patient to his or her PLOF.   Eval: mildly antalgic, lacking bilateral TKE prior to heel strike (pre-op)  Post-Op Reassess:  With RW: stiff knee gait pattern including impaired heel/toe mechanics, lacking TKE prior to heel strike, decreased knee flexion during push off, and decrease knee flexion during swing, slow gait speed, WBOS     5/6/22  Current: same as above     Patient will be able to safely negotiate a full flight of stairs with a step-through pattern while holding onto at least one handrail in order to return to normal with this functional activity and improve safety on stairs.   Eval: FF step-through pattern (pre-op)  Current: Pt utilizes Sohan UE but, step through 5/11/22    PLAN  [x]  Upgrade activities as tolerated     [x]  Continue plan of care  []  Update interventions per flow sheet       []  Discharge due to:_  []  Other:_      Lina Newsome, [facility] 5/11/2022  3:51 PM    Future Appointments   Date Time Provider Quincy Mari   5/16/2022 10:15 AM Pratima Whalen [facility]   5/18/2022  3:30 PM Kenny Box, [facility]   5/20/2022 10:15 AM Pratima Whalen [facility]   5/23/2022  2:45 PM Pratima Whalen, [facility]   5/24/2022  1:15 PM Pratima Whalen, [facility]   5/27/2022  1:15 PM Suman, Kareen Beverage, [facility]   5/31/2022  2:00 PM RUST [facility]   6/1/2022  3:30 PM RUST [facility]   6/3/2022  3:30 PM Suman, Kareen Beverage, [facility]   6/6/2022  3:30 PM Suman, Kareen Beverage, [facility]   6/8/2022  3:30 PM [facility]   6/10/2022  3:30 PM Earline Will [facility]   6/13/2022  3:30 PM Suman, Earline [facility]   6/15/2022  3:30 PM RUST [facility]   6/17/2022  3:30 PM Suman, Earline [facility]   6/20/2022  3:30 PM Suman, Earline [facility]   6/22/2022  3:30 PM RUST [facility]   6/24/2022  3:30 PM Suman, Earline [facility]   6/27/2022  3:30 PM Suman, Earline [facility]   6/29/2022  3:30 PM RUST [facility]

## 2022-05-13 ENCOUNTER — APPOINTMENT (OUTPATIENT)
Dept: PHYSICAL THERAPY | Age: 72
End: 2022-05-13
Payer: MEDICARE

## 2022-05-16 ENCOUNTER — HOSPITAL ENCOUNTER (OUTPATIENT)
Dept: PHYSICAL THERAPY | Age: 72
Discharge: HOME | End: 2022-05-16
Payer: MEDICARE

## 2022-05-16 PROCEDURE — 97016 VASOPNEUMATIC DEVICE THERAPY: CPT

## 2022-05-16 PROCEDURE — 97530 THERAPEUTIC ACTIVITIES: CPT

## 2022-05-16 PROCEDURE — 97110 THERAPEUTIC EXERCISES: CPT

## 2022-05-16 NOTE — PROGRESS NOTES
PT DAILY TREATMENT NOTE    Patient Name: Alicia Guevara  Date:2022  : 1950  [x]  Patient  Verified  Payor: Lolita Villavicencio / Plan: [place] MED[place] & B / Product Type: Medicare /    In time:10:15  Out time:11:15  Total Treatment Time (min): 60  Total Timed Codes (min): 50  1:1 Treatment Time (MC/BCBS only): 45   Visit #: 5 of 25    Treatment Dx: Left knee pain [M25.562]    SUBJECTIVE  Pain Level (0-10 scale): 5/10  Any medication changes, allergies to medications, adverse drug reactions, diagnosis change, or new procedure performed?: [x] No    [] Yes (see summary sheet for update)  Subjective functional status/changes:   [] No changes reported  \"I have some pain for sure. \"    OBJECTIVE    Modalities Rationale:     decrease edema, decrease inflammation and decrease pain to improve patient's ability to return to prior level of physical activity. 10 min [x]  Vasopneumatic Device, press/temp: Med/Low   If using vaso (only need to measure limb vaso being performed on)      pre-treatment girth : 45 cm      post-treatment girth : 44 cm      measured at (landmark location) :  Mid-Patella   [x] Skin assessment post-treatment (if applicable):    [x]  intact    []  redness- no adverse reaction                  []redness  adverse reaction:            40 min Therapeutic Exercise:  [x] See flow sheet :   Rationale: increase ROM, increase strength and improve coordination to improve the patients ability to return to prior level of physical activity. 10 min Therapeutic Activity:  [x]  See flow sheet :   Rationale: increase ROM, increase strength and improve coordination  to improve the patients ability to return to prior level of physical activity.               With   [] TE   [] TA   [] neuro   [] other: Patient Education: [x] Review HEP    [] Progressed/Changed HEP based on:   [] positioning   [] body mechanics   [] transfers   [] heat/ice application    [] other:      Other Objective/Functional Measures: Pain Level (0-10 scale) post treatment: 0/10    ASSESSMENT/Changes in Function: Pt arrived ambulating with SPC with good step through gait mechanics and good bent knee swing phase. Pt reported continued stiffness and swelling but, not above tolerance. Pt demonstrated AAROM of 121 with strap at this time. Pt will perform VASO on visit 6 due to performing visit recently on visit to, to allow for most accurate results. Pt received VASO post tx for reduction of swelling and pain. Patient will continue to benefit from skilled PT services to modify and progress therapeutic interventions, address functional mobility deficits, address ROM deficits, address strength deficits, analyze and address soft tissue restrictions, analyze and cue movement patterns, analyze and modify body mechanics/ergonomics and assess and modify postural abnormalities to attain remaining goals. [x]  See Plan of Care  []  See progress note/recertification  []  See Discharge Summary         Progress towards goals / Updated goals:  Short Term Goals:    to be accomplished within 4 weeks:     Patient will report compliance with prescribed HEP(s) at least 1x/day in order to assist in maximizing therapeutic gains, reduce symptoms, and to progress towards overall prior function.   Pre-Op: HEP issued and reviewed with patient, patient also educated in edema reduction techniques              Post-Op Reassess: Patient states he's been icing and elevating, but hasn't yet performed all of his HEP exercises.  Patient given another copy of his HEP today and was advised to please perform 3x/day to promote further ROM/strength/function.     5/6/22              Current: Patient reports compliance with his HEP 3x/day, in addition to walking around his home with his FWW several times/day.     5/10/22, in progress     Patient will demonstrate at least 5-100 deg of left knee AROM post-operatively in order to return to prior functional activities and mobility. Pre-Op AROM: 8-125 deg  Post-Op Reassess:  AROM: 7-87 deg; AAROM: 5-93 deg      5/6/22  Current: same as above        Long Term Goals:     to be accomplished within 9 weeks:     Patient will score at least 64 points on FOTO in order to maximize function and promote patient satisfaction with overall outcome. (Antonetta Sloop is an established functional score where 100 = no disability)  Pre-Op FOTO: 52              Post-Op Reassess: 31   5/6/22              Current: same as above     Patient will report less than 2/10 pain during all functional activities and ADLs in order to improve QOL and return to patient's PLOF. Post-Op Reassess: to be completed s/p surgery  Post-Op Reassess:  6-7/10 pain currently, with narcotics      5/6/22              Current: same as above     Patient will demonstrate functional and painfree AROM of at least 0-120 deg in order to return to prior functional activities and mobility. Pre-Op AROM: 8-125 deg  Post-Op Reassess:  AROM: 7-87 deg; AAROM: 5-93 deg      5/6/22  Current: AAROM: 121 deg of  Flexion 5/16/22     Patient will demonstrate at least 5/5 strength bilaterally in order to be able to safely perform functional activities and demonstrate improved stability and strength.              Pre-Op Strength: Grossly 5/5 bilaterally except bilateral hip flexion 4+/5  Post-Op Reassess:  (+) extensor lag with mild atrophy of left quad, did not formally test other musculature (will at later date once better AROM is achieved)      5/6/22  Current: same as above     Patient will be able to safely ambulate community distances without and normal gait mechanics in order to improve overall mobility and return patient to his or her PLOF.   Eval: mildly antalgic, lacking bilateral TKE prior to heel strike (pre-op)  Post-Op Reassess:  With RW: stiff knee gait pattern including impaired heel/toe mechanics, lacking TKE prior to heel strike, decreased knee flexion during push off, and decrease knee flexion during swing, slow gait speed, WBOS     5/6/22  Current: same as above     Patient will be able to safely negotiate a full flight of stairs with a step-through pattern while holding onto at least one handrail in order to return to normal with this functional activity and improve safety on stairs.   Eval: FF step-through pattern (pre-op)  Current: Pt utilizes Sohan UE but, step through 5/11/22    PLAN  [x]  Upgrade activities as tolerated     [x]  Continue plan of care  []  Update interventions per flow sheet       []  Discharge due to:_  []  Other:_      John Rodriguez PTA 5/16/2022  10:22 AM    Future Appointments   Date Time Provider Quincy Mari   5/18/2022  3:30 PM Last Aguilar, [address] [facility]   5/20/2022 10:15 AM Víctor De La Torre [facility]   5/23/2022  2:45 PM [facility]   5/24/2022  1:15 PM Víctor De La Torre [facility] [place] [facility]   5/27/2022  1:15 PM Carlee Will [facility]   5/31/2022  2:00 PM [facility]   6/1/2022  3:30 PM [facility]   6/3/2022  3:30 PM Carlee Will [facility]   6/6/2022  3:30 PM Carlee Will [facility]   6/8/2022  3:30 PM [facility]   6/10/2022  3:30 PM Suman [facility]   6/13/2022  3:30 PM Suman [facility]   6/15/2022  3:30 PM [facility]   6/17/2022  3:30 PM Suman [facility]   6/20/2022  3:30 PM Suman [facility]   6/22/2022  3:30 PM [facility]   6/24/2022  3:30 PM Suman [facility]   6/27/2022  3:30 PM Suman [facility]   6/29/2022  3:30 PM [facility]

## 2022-05-18 ENCOUNTER — HOSPITAL ENCOUNTER (OUTPATIENT)
Dept: PHYSICAL THERAPY | Age: 72
Discharge: HOME | End: 2022-05-18
Payer: MEDICARE

## 2022-05-18 PROCEDURE — 97530 THERAPEUTIC ACTIVITIES: CPT

## 2022-05-18 PROCEDURE — 97110 THERAPEUTIC EXERCISES: CPT

## 2022-05-18 PROCEDURE — 97016 VASOPNEUMATIC DEVICE THERAPY: CPT

## 2022-05-18 NOTE — PROGRESS NOTES
PT DAILY TREATMENT NOTE    Patient Name: Christoper Duane  Date:2022  : 1950  [x]  Patient  Verified  Payor: [place] MEDICARE / Plan: [place] MEDICARE PART A & B / Product Type: Medicare /    In time:3:31  Out time:4:26  Total Treatment Time (min): 55  Total Timed Codes (min): 45  1:1 Treatment Time (MC/BCBS only): 45   Visit #: 5 of 25    Treatment Dx: Left knee pain [M25.562]    SUBJECTIVE  Pain Level (0-10 scale): 4  Any medication changes, allergies to medications, adverse drug reactions, diagnosis change, or new procedure performed?: [x] No    [] Yes (see summary sheet for update)  Subjective functional status/changes:   [x] No changes reported     OBJECTIVE    Modalities Rationale:     decrease edema, decrease inflammation and decrease pain to improve patient's ability to perform pain free ADLs   min [] Estim, type/location:                                      []  att     []  unatt     []  w/US     []  w/ice    []  w/heat    min []  Mechanical Traction: type/lbs                   []  pro   []  sup   []  int   []  cont    []  before manual    []  after manual    min []  Ultrasound, settings/location:      min []  Iontophoresis w/ dexamethasone, location:                                               []  take home patch       []  in clinic    min []  Ice     []  Heat    location/position:    10 min [x]  Vasopneumatic Device, press/temp: 34, med   If using vaso (only need to measure limb vaso being performed on)      pre-treatment girth : 45.5 cm    post-treatment girth : 45.5 cm      measured at (landmark location) :  Mid patella    min []  Other:    [] Skin assessment post-treatment (if applicable):    []  intact    []  redness- no adverse reaction                  []redness  adverse reaction:      25 min Therapeutic Exercise:  [x] See flow sheet :   Rationale: increase ROM and increase strength to improve the patients ability to return to PLOF.      16 min Therapeutic Activity:  [x]  See flow sheet : Rationale: increase ROM and increase strength  to improve the patients ability to perform ADLS. 4 min Manual Therapy:  Prone knee extension stretch with overpressure   Rationale: increase ROM to improve the patients ability to ambulate without compensation. The manual therapy interventions were performed at a separate and distinct time from the therapeutic activities interventions. With   [x] TE   [x] TA   [] neuro   [] other: Patient Education: [x] Review HEP    [] Progressed/Changed HEP based on:   [] positioning   [] body mechanics   [] transfers   [] heat/ice application    [] other:      Other Objective/Functional Measures: see goals     Pain Level (0-10 scale) post treatment: 2    ASSESSMENT/Changes in Function: Pt with good tolerance to today's session. Pt reported too much challenge during performance of 4\" step down so regressed to step ups. Pt with good tolerance to addition of SLR in order to improve quad control. Pt is making good progress towards their goals. Pt will continue to benefit from skilled therapeutic intervention at this time to address th remaining deficits as discussed in goals below. Patient will continue to benefit from skilled PT services to modify and progress therapeutic interventions, address functional mobility deficits, address ROM deficits, address strength deficits, analyze and address soft tissue restrictions, analyze and cue movement patterns, analyze and modify body mechanics/ergonomics, assess and modify postural abnormalities and address imbalance/dizziness to attain remaining goals.      [x]  See Plan of Care  []  See progress note/recertification  []  See Discharge Summary         Progress towards goals / Updated goals:  Short Term Goals:    to be accomplished within 4 weeks:     Patient will report compliance with prescribed HEP(s) at least 1x/day in order to assist in maximizing therapeutic gains, reduce symptoms, and to progress towards overall prior function. Pre-Op: HEP issued and reviewed with patient, patient also educated in edema reduction techniques              Post-Op Reassess: Patient states he's been icing and elevating, but hasn't yet performed all of his HEP exercises.  Patient given another copy of his HEP today and was advised to please perform 3x/day to promote further ROM/strength/function.     5/6/22              Current: Patient reports compliance with his HEP 3x/day, in addition to walking around his home with his FWW several times/day.     5/10/22, in progress     Patient will demonstrate at least 5-100 deg of left knee AROM post-operatively in order to return to prior functional activities and mobility. Pre-Op AROM: 8-125 deg  Post-Op Reassess:  AROM: 7-87 deg; AAROM: 5-93 deg           5/6/22  Current: 5/18/22 AROM: 5-110 deg; AAROM: 4-115 deg          Long Term Goals:     to be accomplished within 9 weeks:     Patient will score at least 64 points on FOTO in order to maximize function and promote patient satisfaction with overall outcome. (Geralene Giles is an established functional score where 100 = no disability)  Pre-Op FOTO: 52              Post-Op Reassess: 31   5/6/22              Current: 5/18/22: 44      Patient will report less than 2/10 pain during all functional activities and ADLs in order to improve QOL and return to patient's PLOF. Post-Op Reassess: to be completed s/p surgery  Post-Op Reassess:  6-7/10 pain currently, with narcotics      5/6/22              Current: same as above     Patient will demonstrate functional and painfree AROM of at least 0-120 deg in order to return to prior functional activities and mobility.   Pre-Op AROM: 8-125 deg  Post-Op Reassess:  AROM: 7-87 deg; AAROM: 5-93 deg      5/6/22  Current: AAROM: 121 deg of  Flexion 5/16/22     Patient will demonstrate at least 5/5 strength bilaterally in order to be able to safely perform functional activities and demonstrate improved stability and strength.              Pre-Op Strength: Grossly 5/5 bilaterally except bilateral hip flexion 4+/5  Post-Op Reassess:  (+) extensor lag with mild atrophy of left quad, did not formally test other musculature (will at later date once better AROM is achieved)      5/6/22  Current: 5/18/22: pt able to perform SLR with minor extension lag.      Patient will be able to safely ambulate community distances without and normal gait mechanics in order to improve overall mobility and return patient to his or her PLOF. Eval: mildly antalgic, lacking bilateral TKE prior to heel strike (pre-op)  Post-Op Reassess:  With RW: stiff knee gait pattern including impaired heel/toe mechanics, lacking TKE prior to heel strike, decreased knee flexion during push off, and decrease knee flexion during swing, slow gait speed, WBOS     5/6/22  Current: same as above     Patient will be able to safely negotiate a full flight of stairs with a step-through pattern while holding onto at least one handrail in order to return to normal with this functional activity and improve safety on stairs.   Eval: FF step-through pattern (pre-op)  Current: Pt utilizes Sohan UE but, step through 5/11/22    PLAN  []  Upgrade activities as tolerated     [x]  Continue plan of care  []  Update interventions per flow sheet       []  Discharge due to:_  []  Other:_      Juan Carlos Castro, PT 5/18/2022  11:24 AM    Future Appointments   Date Time Provider Quincy Mari   5/18/2022  3:30 PM Ginakendra Andrea, [address] [facility]   5/20/2022 10:15 AM Mallory Vivas PTA [facility]   5/23/2022  2:45 PM [facility]   5/24/2022  1:15 PM [facility]   5/27/2022  1:15 PM Anny Will, PT [facility]   5/31/2022  2:00 PM [facility]   6/1/2022  3:30 PM [facility]   6/3/2022  3:30 PM Eleuterio Will [facility]   6/6/2022  3:30 PM Anny Will, [facility]   6/8/2022  3:30 PM [facility]   6/10/2022  3:30 PM [facility]   6/13/2022  3:30 PM [facility]   6/15/2022  3:30 PM [facility]   6/17/2022  3:30 PM [facility]   6/20/2022  3:30 PM Gin [facility]   6/22/2022  3:30 PM [facility]   6/24/2022  3:30 PM Gin [facility]   6/27/2022  3:30 PM Gin [facility]   6/29/2022  3:30 PM [facility]

## 2022-05-20 ENCOUNTER — HOSPITAL ENCOUNTER (OUTPATIENT)
Dept: PHYSICAL THERAPY | Age: 72
Discharge: HOME | End: 2022-05-20
Payer: MEDICARE

## 2022-05-20 PROCEDURE — 97016 VASOPNEUMATIC DEVICE THERAPY: CPT

## 2022-05-20 PROCEDURE — 97530 THERAPEUTIC ACTIVITIES: CPT

## 2022-05-20 PROCEDURE — 97110 THERAPEUTIC EXERCISES: CPT

## 2022-05-20 NOTE — PROGRESS NOTES
PT DAILY TREATMENT NOTE    Patient Name: Rosalio Camarillo  Date:2022  : 1950  [x]  Patient  Verified  Payor: [place] MEDICARE / Plan: [place] MEDICARE PART A & B / Product Type: Medicare /    In time:10:15  Out time:11:15  Total Treatment Time (min): 60  Total Timed Codes (min): 45  1:1 Treatment Time (MC/BCBS only): 45   Visit #: 7 of 25    Treatment Dx: Left knee pain [M25.562]    SUBJECTIVE  Pain Level (0-10 scale): 4/10  Any medication changes, allergies to medications, adverse drug reactions, diagnosis change, or new procedure performed?: [x] No    [] Yes (see summary sheet for update)  Subjective functional status/changes:   [] No changes reported  \"I have some pain today. Still pretty stiff too. \"    OBJECTIVE    Modalities Rationale:     decrease edema, decrease inflammation and decrease pain to improve patient's ability to return to prior level of physical activity. 10 min [x]  Vasopneumatic Device, press/temp: Med/Low   If using vaso (only need to measure limb vaso being performed on)      pre-treatment girth : 43 cm      post-treatment girth : 42 cm      measured at (landmark location) :  Mid-Patella   [x] Skin assessment post-treatment (if applicable):    [x]  intact    []  redness- no adverse reaction                  []redness  adverse reaction:            35 min Therapeutic Exercise:  [x] See flow sheet :   Rationale: increase ROM, increase strength and improve coordination to improve the patients ability to return to prior level of physical activity. 15 min Therapeutic Activity:  [x]  See flow sheet :   Rationale: increase ROM, increase strength and improve coordination  to improve the patients ability to return to prior level of physical activity.          With   [] TE   [] TA   [] neuro   [] other: Patient Education: [x] Review HEP    [] Progressed/Changed HEP based on:   [] positioning   [] body mechanics   [] transfers   [] heat/ice application    [] other:      Other Objective/Functional Measures:      Pain Level (0-10 scale) post treatment: 0/10    ASSESSMENT/Changes in Function: Pt tolerated session well with no increased pain. Pt continues to demonstrated slight decreased full knee extension. Pt also demonstrated slight extensor lag with SLR but, able to mostly correct with VC for active quad set. Pt reported greatly increased fatigue with ex but, not above tolerance. Pt received VASO post tx for reduction of pain and swelling. Patient will continue to benefit from skilled PT services to modify and progress therapeutic interventions, address functional mobility deficits, address ROM deficits, address strength deficits, analyze and address soft tissue restrictions, analyze and cue movement patterns, analyze and modify body mechanics/ergonomics and assess and modify postural abnormalities to attain remaining goals. [x]  See Plan of Care  []  See progress note/recertification  []  See Discharge Summary         Progress towards goals / Updated goals:  Short Term Goals:    to be accomplished within 4 weeks:     Patient will report compliance with prescribed HEP(s) at least 1x/day in order to assist in maximizing therapeutic gains, reduce symptoms, and to progress towards overall prior function. Pre-Op: HEP issued and reviewed with patient, patient also educated in edema reduction techniques              Post-Op Reassess: Patient states he's been icing and elevating, but hasn't yet performed all of his HEP exercises.  Patient given another copy of his HEP today and was advised to please perform 3x/day to promote further ROM/strength/function.     5/6/22              Current: Patient reports compliance with his HEP 3x/day, in addition to walking around his home with his FWW several times/day.     5/10/22, in progress     Patient will demonstrate at least 5-100 deg of left knee AROM post-operatively in order to return to prior functional activities and mobility.   Pre-Op AROM: 8-125 deg  Post-Op Reassess:  AROM: 7-87 deg; AAROM: 5-93 deg           5/6/22  Current: 5/18/22 AROM: 5-110 deg; AAROM: 4-115 deg          Long Term Goals:     to be accomplished within 9 weeks:     Patient will score at least 64 points on FOTO in order to maximize function and promote patient satisfaction with overall outcome. (Ammy Sickle is an established functional score where 100 = no disability)  Pre-Op FOTO: 52              Post-Op Reassess: 31   5/6/22              Current: 5/18/22: 44      Patient will report less than 2/10 pain during all functional activities and ADLs in order to improve QOL and return to patient's PLOF. Post-Op Reassess: to be completed s/p surgery  Post-Op Reassess:  6-7/10 pain currently, with narcotics      5/6/22              Current: same as above     Patient will demonstrate functional and painfree AROM of at least 0-120 deg in order to return to prior functional activities and mobility. Pre-Op AROM: 8-125 deg  Post-Op Reassess:  AROM: 7-87 deg; AAROM: 5-93 deg      5/6/22  Current: AAROM: 121 deg of  Flexion 5/16/22     Patient will demonstrate at least 5/5 strength bilaterally in order to be able to safely perform functional activities and demonstrate improved stability and strength.              Pre-Op Strength: Grossly 5/5 bilaterally except bilateral hip flexion 4+/5  Post-Op Reassess:  (+) extensor lag with mild atrophy of left quad, did not formally test other musculature (will at later date once better AROM is achieved)      5/6/22  Current: 5/18/22: pt able to perform SLR with minor extension lag.      Patient will be able to safely ambulate community distances without and normal gait mechanics in order to improve overall mobility and return patient to his or her PLOF.   Eval: mildly antalgic, lacking bilateral TKE prior to heel strike (pre-op)  Post-Op Reassess:  With RW: stiff knee gait pattern including impaired heel/toe mechanics, lacking TKE prior to heel strike, decreased knee flexion during push off, and decrease knee flexion during swing, slow gait speed, WBOS     5/6/22  Current: Pt amb with SPC with slight abnormality of stance phase of Left knee with slightly decreased hell strike 5/20/22     Patient will be able to safely negotiate a full flight of stairs with a step-through pattern while holding onto at least one handrail in order to return to normal with this functional activity and improve safety on stairs.   Eval: FF step-through pattern (pre-op)  Current: Pt utilizes Sohan UE but, step through 5/11/22    PLAN  [x]  Upgrade activities as tolerated     [x]  Continue plan of care  []  Update interventions per flow sheet       []  Discharge due to:_  []  Other:_      Tanesha Noriega, KAILYN 5/20/2022  10:30 AM    Future Appointments   Date Time Provider Quincy Mari   5/23/2022  2:45 PM [facility] THE [facility]   5/24/2022  1:15 PM [facility] THE [facility]   5/27/2022  1:15 PM Michael Will, [facility]   5/31/2022  2:00 PM [facility] THE [facility]   6/1/2022  3:30 PM [facility] THE [facility]   6/3/2022  3:30 PM Michael Will, [facility] THE [facility]   6/6/2022  3:30 PM Michael Will, [facility] THE [facility]   6/8/2022  3:30 PM [facility] THE [facility]   6/10/2022  3:30 PM [facility]   6/13/2022  3:30 PM [facility]   6/15/2022  3:30 PM [facility] THE [facility]   6/17/2022  3:30 PM [facility]   6/20/2022  3:30 PM [facility]   6/22/2022  3:30 PM [facility] THE [facility]   6/24/2022  3:30 PM [facility]   6/27/2022  3:30 PM [facility]   6/29/2022  3:30 PM [facility] THE [facility]

## 2022-05-23 ENCOUNTER — HOSPITAL ENCOUNTER (OUTPATIENT)
Dept: PHYSICAL THERAPY | Age: 72
Discharge: HOME | End: 2022-05-23
Payer: MEDICARE

## 2022-05-23 ENCOUNTER — HOSPITAL ENCOUNTER (OUTPATIENT)
Dept: PHYSICAL THERAPY | Age: 72
End: 2022-05-23
Payer: MEDICARE

## 2022-05-23 PROCEDURE — 97530 THERAPEUTIC ACTIVITIES: CPT

## 2022-05-23 PROCEDURE — 97110 THERAPEUTIC EXERCISES: CPT

## 2022-05-23 PROCEDURE — 97016 VASOPNEUMATIC DEVICE THERAPY: CPT

## 2022-05-23 NOTE — PROGRESS NOTES
PT DAILY TREATMENT NOTE    Patient Name: Madhuri Echeverria  Date:2022  : 1950  [x]  Patient  Verified  Payor: Ross Carlson / Plan: [place] MEDICARE PART A & B / Product Type: Medicare /    In time:2:45  Out time:3:43  Total Treatment Time (min): 58  Total Timed Codes (min): 48  1:1 Treatment Time (MC/BCBS only): 45   Visit #: 8 of 25    Treatment Dx: Left knee pain [M25.562]    SUBJECTIVE  Pain Level (0-10 scale): 3/10  Any medication changes, allergies to medications, adverse drug reactions, diagnosis change, or new procedure performed?: [x] No    [] Yes (see summary sheet for update)  Subjective functional status/changes:   [] No changes reported  \"I have some pain right now but, not too bad. \"    OBJECTIVE    Modalities Rationale:     decrease edema, decrease inflammation and decrease pain to improve patient's ability to return to prior level of physical activity. 10 min [x]  Vasopneumatic Device, press/temp: Med/Low   If using vaso (only need to measure limb vaso being performed on)      pre-treatment girth : 43 cm      post-treatment girth : 42 cm      measured at (landmark location) :  Mid-Patella   [x] Skin assessment post-treatment (if applicable):    [x]  intact    []  redness- no adverse reaction                  []redness  adverse reaction:            33 min Therapeutic Exercise:  [x] See flow sheet :   Rationale: increase ROM, increase strength and improve coordination to improve the patients ability to return to prior level of physical activity. 15 min Therapeutic Activity:  [x]  See flow sheet :   Rationale: increase ROM, increase strength and improve coordination  to improve the patients ability to return to prior level of physical activity.       With   [] TE   [] TA   [] neuro   [] other: Patient Education: [x] Review HEP    [] Progressed/Changed HEP based on:   [] positioning   [] body mechanics   [] transfers   [] heat/ice application    [] other:      Other Objective/Functional Measures:      Pain Level (0-10 scale) post treatment: 0/10    ASSESSMENT/Changes in Function: Pt arrived reporting mild pain in knee but, not above tolerance. Pt continues to report stiffness and swelling in knee. Pt was able to tolerated therex with no increased pain above tolerance. Pt received VASO post tx for reduction of pain and swelling. Patient will continue to benefit from skilled PT services to modify and progress therapeutic interventions, address functional mobility deficits, address ROM deficits, address strength deficits, analyze and address soft tissue restrictions, analyze and cue movement patterns, analyze and modify body mechanics/ergonomics and assess and modify postural abnormalities to attain remaining goals. [x]  See Plan of Care  []  See progress note/recertification  []  See Discharge Summary         Progress towards goals / Updated goals:  Short Term Goals:    to be accomplished within 4 weeks:     Patient will report compliance with prescribed HEP(s) at least 1x/day in order to assist in maximizing therapeutic gains, reduce symptoms, and to progress towards overall prior function. Pre-Op: HEP issued and reviewed with patient, patient also educated in edema reduction techniques              Post-Op Reassess: Patient states he's been icing and elevating, but hasn't yet performed all of his HEP exercises.  Patient given another copy of his HEP today and was advised to please perform 3x/day to promote further ROM/strength/function.     5/6/22              Current: Patient reports compliance with his HEP 3x/day, in addition to walking around his home with his FWW several times/day.     5/10/22, in progress     Patient will demonstrate at least 5-100 deg of left knee AROM post-operatively in order to return to prior functional activities and mobility.   Pre-Op AROM: 8-125 deg  Post-Op Reassess:  AROM: 7-87 deg; AAROM: 5-93 deg           5/6/22  Current: 5/18/22 AROM: 5-110 deg; AAROM: 4-115 deg          Long Term Goals:     to be accomplished within 9 weeks:     Patient will score at least 64 points on FOTO in order to maximize function and promote patient satisfaction with overall outcome. (Sarai Eaton is an established functional score where 100 = no disability)  Pre-Op FOTO: 52              Post-Op Reassess: 31   5/6/22              Current: 5/18/22: 44      Patient will report less than 2/10 pain during all functional activities and ADLs in order to improve QOL and return to patient's PLOF. Post-Op Reassess: to be completed s/p surgery  Post-Op Reassess:  6-7/10 pain currently, with narcotics      5/6/22              Current: 5/10 at worst in the past week 5/23/22     Patient will demonstrate functional and painfree AROM of at least 0-120 deg in order to return to prior functional activities and mobility. Pre-Op AROM: 8-125 deg  Post-Op Reassess:  AROM: 7-87 deg; AAROM: 5-93 deg      5/6/22  Current: AAROM: 121 deg of  Flexion 5/16/22     Patient will demonstrate at least 5/5 strength bilaterally in order to be able to safely perform functional activities and demonstrate improved stability and strength.              Pre-Op Strength: Grossly 5/5 bilaterally except bilateral hip flexion 4+/5  Post-Op Reassess:  (+) extensor lag with mild atrophy of left quad, did not formally test other musculature (will at later date once better AROM is achieved)      5/6/22  Current: 5/18/22: pt able to perform SLR with minor extension lag.      Patient will be able to safely ambulate community distances without and normal gait mechanics in order to improve overall mobility and return patient to his or her PLOF.   Eval: mildly antalgic, lacking bilateral TKE prior to heel strike (pre-op)  Post-Op Reassess:  With RW: stiff knee gait pattern including impaired heel/toe mechanics, lacking TKE prior to heel strike, decreased knee flexion during push off, and decrease knee flexion during swing, slow gait speed, WBOS     5/6/22  Current: Pt amb with SPC with slight abnormality of stance phase of Left knee with slightly decreased hell strike 5/20/22     Patient will be able to safely negotiate a full flight of stairs with a step-through pattern while holding onto at least one handrail in order to return to normal with this functional activity and improve safety on stairs.   Eval: FF step-through pattern (pre-op)  Current: Pt utilizes Sohan UE but, step through 5/11/22    PLAN  [x]  Upgrade activities as tolerated     [x]  Continue plan of care  []  Update interventions per flow sheet       []  Discharge due to:_  []  Other:_      Rusty Hsieh, PTA 5/23/2022  2:47 PM    Future Appointments   Date Time Provider Quincy Mari   5/25/2022  8:45 AM [facility]   5/27/2022  1:15 PM [facility]   6/1/2022 11:00 AM [facility]   [facility]   6/6/2022  7:45 AM Tasha Mixon PT, DPT [facility]   6/8/2022  8:30 AM Tasha Mixon PT, DPT [facility] THE [facility]   6/13/2022  7:45 AM Tasha Mixon PT, DPT [facility]   6/15/2022  3:45 PM Reagan Jorge PT [facility]   6/20/2022  7:45 AM Tasha Mixon PT, DPT [facility]   6/22/2022  8:30 AM Nora Glass PT [facility]   6/24/2022  8:30 AM Ok William [facility]   6/27/2022  7:45 AM Reagan Jorge PT [facility]   6/29/2022  8:30 AM Tasha Mixon PT, JOSE [facility]   7/1/2022  8:30 AM Ok William [facility]

## 2022-05-24 ENCOUNTER — APPOINTMENT (OUTPATIENT)
Dept: PHYSICAL THERAPY | Age: 72
End: 2022-05-24
Payer: MEDICARE

## 2022-05-25 ENCOUNTER — HOSPITAL ENCOUNTER (OUTPATIENT)
Dept: PHYSICAL THERAPY | Age: 72
Discharge: HOME | End: 2022-05-25
Payer: MEDICARE

## 2022-05-25 PROCEDURE — 97016 VASOPNEUMATIC DEVICE THERAPY: CPT

## 2022-05-25 PROCEDURE — 97110 THERAPEUTIC EXERCISES: CPT

## 2022-05-25 PROCEDURE — 97530 THERAPEUTIC ACTIVITIES: CPT

## 2022-05-25 NOTE — PROGRESS NOTES
PT DAILY TREATMENT NOTE    Patient Name: Mallory Massey  Date:2022  : 1950  [x]  Patient  Verified  Payor: [place] MEDICARE / Plan: [place] MEDICARE PART A & B / Product Type: Medicare /    In time:8:45  Out time:9:44  Total Treatment Time (min): 59  Total Timed Codes (min): 45  1:1 Treatment Time (MC/BCBS only): 45   Visit #:  of 25    Treatment Dx: Left knee pain [M25.562]    SUBJECTIVE  Pain Level (0-10 scale): 2/10  Any medication changes, allergies to medications, adverse drug reactions, diagnosis change, or new procedure performed?: [x] No    [] Yes (see summary sheet for update)  Subjective functional status/changes:   [] No changes reported  \"I just have a little pain. \"    OBJECTIVE    Modalities Rationale:     decrease edema, decrease inflammation and decrease pain to improve patient's ability to return to prior level of physical activity. 10 min [x]  Vasopneumatic Device, press/temp: Med/low   If using vaso (only need to measure limb vaso being performed on)      pre-treatment girth : 42.5 cm      post-treatment girth : 41.5 cm      measured at (landmark location) :  Mid-Patella   [x] Skin assessment post-treatment (if applicable):    [x]  intact    []  redness- no adverse reaction                  []redness  adverse reaction:            34 min Therapeutic Exercise:  [x] See flow sheet :   Rationale: increase ROM, increase strength and improve coordination to improve the patients ability to return to prior level of physical activity. 15 min Therapeutic Activity:  [x]  See flow sheet :   Rationale: increase ROM, increase strength and improve coordination  to improve the patients ability to return to prior level of physical activity.        With   [] TE   [] TA   [] neuro   [] other: Patient Education: [x] Review HEP    [] Progressed/Changed HEP based on:   [] positioning   [] body mechanics   [] transfers   [] heat/ice application    [] other:      Other Objective/Functional Measures:      Pain Level (0-10 scale) post treatment: 2/10    ASSESSMENT/Changes in Function: Pt arrived reporting continued very mild pain in knee with soreness and tightness as well. Pt reports continued difficulty with stairs but, shows improvement demonstring no UE required for ascending but single UE support required for reciprocal gait. Pt received VASO post tx for reduction of pain and swelling. Patient will continue to benefit from skilled PT services to modify and progress therapeutic interventions, address functional mobility deficits, address ROM deficits, address strength deficits, analyze and address soft tissue restrictions, analyze and cue movement patterns, analyze and modify body mechanics/ergonomics and assess and modify postural abnormalities to attain remaining goals. [x]  See Plan of Care  []  See progress note/recertification  []  See Discharge Summary         Progress towards goals / Updated goals:  Short Term Goals:    to be accomplished within 4 weeks:     Patient will report compliance with prescribed HEP(s) at least 1x/day in order to assist in maximizing therapeutic gains, reduce symptoms, and to progress towards overall prior function. Pre-Op: HEP issued and reviewed with patient, patient also educated in edema reduction techniques              Post-Op Reassess: Patient states he's been icing and elevating, but hasn't yet performed all of his HEP exercises.  Patient given another copy of his HEP today and was advised to please perform 3x/day to promote further ROM/strength/function.     5/6/22              Current: Patient reports compliance with his HEP 3x/day, in addition to walking around his home with his FWW several times/day.     5/10/22, in progress     Patient will demonstrate at least 5-100 deg of left knee AROM post-operatively in order to return to prior functional activities and mobility.   Pre-Op AROM: 8-125 deg  Post-Op Reassess:  AROM: 7-87 deg; AAROM: 5-93 deg           5/6/22  Current: 5/18/22 AROM: 5-110 deg; AAROM: 4-115 deg          Long Term Goals:     to be accomplished within 9 weeks:     Patient will score at least 64 points on FOTO in order to maximize function and promote patient satisfaction with overall outcome. (Maddie Reynoso is an established functional score where 100 = no disability)  Pre-Op FOTO: 52              Post-Op Reassess: 31   5/6/22              Current: 5/18/22: 44      Patient will report less than 2/10 pain during all functional activities and ADLs in order to improve QOL and return to patient's PLOF. Post-Op Reassess: to be completed s/p surgery  Post-Op Reassess:  6-7/10 pain currently, with narcotics      5/6/22              Current: 5/10 at worst in the past week 5/23/22     Patient will demonstrate functional and painfree AROM of at least 0-120 deg in order to return to prior functional activities and mobility. Pre-Op AROM: 8-125 deg  Post-Op Reassess:  AROM: 7-87 deg; AAROM: 5-93 deg      5/6/22  Current: AAROM: 121 deg of  Flexion 5/16/22     Patient will demonstrate at least 5/5 strength bilaterally in order to be able to safely perform functional activities and demonstrate improved stability and strength.              Pre-Op Strength: Grossly 5/5 bilaterally except bilateral hip flexion 4+/5  Post-Op Reassess:  (+) extensor lag with mild atrophy of left quad, did not formally test other musculature (will at later date once better AROM is achieved)      5/6/22   Current: 5/18/22: pt able to perform SLR with minor extension lag.      Patient will be able to safely ambulate community distances without and normal gait mechanics in order to improve overall mobility and return patient to his or her PLOF.   Eval: mildly antalgic, lacking bilateral TKE prior to heel strike (pre-op)  Post-Op Reassess:  With RW: stiff knee gait pattern including impaired heel/toe mechanics, lacking TKE prior to heel strike, decreased knee flexion during push off, and decrease knee flexion during swing, slow gait speed, WBOS     5/6/22  Current: Pt amb with SPC with slight abnormality of stance phase of Left knee with slightly decreased hell strike 5/20/22     Patient will be able to safely negotiate a full flight of stairs with a step-through pattern while holding onto at least one handrail in order to return to normal with this functional activity and improve safety on stairs.   Eval: FF step-through pattern (pre-op)  Current: step throughout pattern with no UE for ascending but, single UE required for descending 5/25/22    PLAN  [x]  Upgrade activities as tolerated     [x]  Continue plan of care  []  Update interventions per flow sheet       []  Discharge due to:_  []  Other:_      Blaire Rodriguez PTA 5/25/2022  8:47 AM    Future Appointments   Date Time Provider Quincy Mari   5/27/2022  1:15 PM Ryley Potteri Snellen [facility] THE [facility]   6/1/2022 11:00 AM Lily Cardenas [facility]   6/3/2022 11:00 AM Suman Roni Snellen [facility] THE [facility]   6/6/2022  7:45 AM Nile Laughter, PT, DPT [facility]   6/8/2022  8:30 AM Nile Laughter, PT, DPT [facility] THE [facility]   6/13/2022  7:45 AM Nile Laughter, PT, DPT [facility]   6/15/2022  3:45 PM Johnny Ross PT [facility]   6/20/2022  7:45 AM Nile Laughter, PT, DPT [facility]   6/22/2022  8:30 AM Maria E Zaldivar PT [facility]   6/24/2022  8:30 AM Marleni William [facility]   6/27/2022  7:45 AM Johnny Ross PT [facility] THE [facility]   6/29/2022  8:30 AM Nile Laughter, PT, DPT [facility]   7/1/2022  8:30 AM Marleni William THE [facility]

## 2022-05-27 ENCOUNTER — APPOINTMENT (OUTPATIENT)
Dept: PHYSICAL THERAPY | Age: 72
End: 2022-05-27
Payer: MEDICARE

## 2022-05-27 ENCOUNTER — HOSPITAL ENCOUNTER (OUTPATIENT)
Dept: PHYSICAL THERAPY | Age: 72
Discharge: HOME | End: 2022-05-27
Payer: MEDICARE

## 2022-05-27 PROCEDURE — 97016 VASOPNEUMATIC DEVICE THERAPY: CPT

## 2022-05-27 PROCEDURE — 97530 THERAPEUTIC ACTIVITIES: CPT

## 2022-05-27 PROCEDURE — 97110 THERAPEUTIC EXERCISES: CPT

## 2022-05-27 NOTE — PROGRESS NOTES
PT DAILY TREATMENT NOTE    Patient Name: Yo Shen  Date: 2022  : 1950  [x]  Patient  Verified  Payor: Lanette Le / Plan: [place] & [place] / Product Type: Medicare /    In Time: 1:10  Out Time: 2:10  Total Treatment Time (min): 60  Total Timed Codes (min): 50  1:1 Treatment Time ( [address] only): 50   Visit #: 10/25    Treatment Dx: Left knee pain [M25.562]    SUBJECTIVE  Pre-Treatment Pain Level (0-10 scale): 3    Any medication changes, allergies to medications, adverse drug reactions, diagnosis change, or new procedure performed?: [x] No    [] Yes (see summary sheet for update)    Subjective Functional Status/Changes:  [] No changes reported  \"I'm feeling good. \"    OBJECTIVE    38 min Therapeutic Exercise:  [x] See flow sheet   Rationale: increase ROM, increase strength, and decrease pain to assist in improving patient's ability to perform functional activities and ADLs    12 min Therapeutic Activity:  [x] See flow sheet   Rationale: increase ROM, increase strength, and improve coordination to assist in improving patient's ability to perform functional activities and ADLs    Modalities Rationale:     to decrease pain and decrease edema to improve QOL and improve patient's ability to perform ADLs   min [] Estim, type/location:                                      []  att     []  unatt     []  w/US     []  w/ice    []  w/heat    min []  Mechanical Traction: type/lbs                   []  pro   []  sup   []  int   []  cont    []  before manual    []  after manual    min []  Ultrasound, settings/location:      min []  Iontophoresis w/ dexamethasone, location:                                               []  take home patch       []  in clinic    min []  Ice     []  Heat    location/position:    10 min [x]  Vasopneumatic Device, press/temp:  Mod pressure/38-40 deg   If using vaso (only need to measure limb vaso being performed on)      pre-treatment girth (in cm) : 44.0      post-treatment girth (in cm) : 42.8      measured at (landmark location) :  Mid-patella    min []  Other:    [x] Skin Assessment Post-Treatment (if applicable):    [x]  intact    [x]  redness  no adverse reaction                 []  redness  adverse reaction:       With   [x] TE   [] TA   [] neuro   [] other: Patient Education: [x] Review HEP    [] Progressed/Changed HEP based on:   [] positioning   [] body mechanics   [] transfers   [] heat/ice application    [] other:      Other Objective/Functional Measures: see below     Pain Level (0-10 scale) Post Treatment: 0    ASSESSMENT/Changes in Function:  Patient responded well to today's treatment without any adverse reactions. Patient is making excellent progress towards goals, which includes steady progress with AROM and LE strength. He does appear to still lack a few degrees of TKE prior to heel strike during ambulation, but this is improving. Patient will continue to benefit from skilled PT services to further modify and progress therapeutic interventions, address functional mobility deficits, address ROM deficits, address strength deficits, analyze and address soft tissue restrictions, analyze and cue movement patterns, analyze and modify body mechanics/ergonomics, assess and modify postural abnormalities, and instruct in home and community integration to attain remaining goals. [x]  See Plan of Care  []  See Progress Note/Recertification  []  See Discharge Summary         Progress Towards Goals/Updated Goals:    Short Term Goals:    to be accomplished within 4 weeks:     Patient will report compliance with prescribed HEP(s) at least 1x/day in order to assist in maximizing therapeutic gains, reduce symptoms, and to progress towards overall prior function.   Pre-Op: HEP issued and reviewed with patient, patient also educated in edema reduction techniques              Post-Op Reassess: Patient states he's been icing and elevating, but hasn't yet performed all of his HEP exercises.  Patient given another copy of his HEP today and was advised to please perform 3x/day to promote further ROM/strength/function.     5/6/22              Current: Patient reports continued daily compliance with his HEP (3x/day). 5/27/22, met     Patient will demonstrate at least 5-100 deg of left knee AROM post-operatively in order to return to prior functional activities and mobility. Pre-Op AROM: 8-125 deg  Post-Op Reassess:  AROM: 7-87 deg; AAROM: 5-93 deg    5/6/22  Current: 5/18/22 AROM: 5-110 deg; AAROM: 4-115 deg          Long Term Goals:     to be accomplished within 9 weeks:     Patient will score at least 64 points on FOTO in order to maximize function and promote patient satisfaction with overall outcome. (Missy Brighter is an established functional score where 100 = no disability)  Pre-Op FOTO: 52              Post-Op Reassess: 31   5/6/22              Current: 47     5/27/22, in progress     Patient will report less than 2/10 pain during all functional activities and ADLs in order to improve QOL and return to patient's PLOF. Post-Op Reassess: to be completed s/p surgery  Post-Op Reassess:  6-7/10 pain currently, with narcotics      5/6/22              Current: 5/10 at worst in the past week    5/23/22, in progress     Patient will demonstrate functional and painfree AROM of at least 0-120 deg in order to return to prior functional activities and mobility.   Pre-Op AROM: 8-125 deg  Post-Op Reassess:  AROM: 7-87 deg; AAROM: 5-93 deg      5/6/22  Current: AAROM: 121 deg of  Flexion    5/16/22     Patient will demonstrate at least 5/5 strength bilaterally in order to be able to safely perform functional activities and demonstrate improved stability and strength.              Pre-Op Strength: Grossly 5/5 bilaterally except bilateral hip flexion 4+/5  Post-Op Reassess:  (+) extensor lag with mild atrophy of left quad, did not formally test other musculature (will at later date once better AROM is achieved)      5/6/22              Current: 5/18/22: pt able to perform SLR with minor extension lag.      Patient will be able to safely ambulate community distances without and normal gait mechanics in order to improve overall mobility and return patient to his or her PLOF. Eval: mildly antalgic, lacking bilateral TKE prior to heel strike (pre-op)  Post-Op Reassess:  With RW: stiff knee gait pattern including impaired heel/toe mechanics, lacking TKE prior to heel strike, decreased knee flexion during push off, and decrease knee flexion during swing, slow gait speed, WBOS     5/6/22  Current: Pt amb with SPC with slight abnormality of stance phase of Left knee with slightly decreased hell strike    5/20/22     Patient will be able to safely negotiate a full flight of stairs with a step-through pattern while holding onto at least one handrail in order to return to normal with this functional activity and improve safety on stairs. Eval: FF step-through pattern (pre-op)  Current: step throughout pattern with no UE for ascending but, single UE required for descending    5/25/22, in progress       PLAN  []  Upgrade Activities as Tolerated     [x]  Continue Plan of Care  []  Update Interventions per Flow Sheet       []  Discharge Due To:_  []  Other:_      Valentine Will, PT, DPT, ATR  5/27/2022 9:11 AM    Future Appointments   Date Time Provider Quincy Mari   5/27/2022  1:15 PM [facility]   6/1/2022 11:00 AM [facility]   6/3/2022 11:00 AM [facility]   6/6/2022  7:45 AM Thuy Parikh PT, DPT [facility]   6/8/2022  8:30 AM Thuy Parikh PT, DPT [facility]   6/13/2022  7:45 AM Thuy Parikh PT, DPT [facility]   6/15/2022  3:45 PM Anderson Rutledge PT [facility]   6/20/2022  7:45 AM Thuy Parikh PT, DPT [facility]   6/22/2022  8:30 AM Celsa Banda PT [facility]   6/24/2022  8:30 AM Duke William [facility]   6/27/2022 7:45 AM ELIZABETH KnoxHPHOLLI [facility]   6/29/2022  8:30 AM Elysia Castillo, ELIZABETH, DPT [facility]   7/1/2022  8:30 AM Moses William [facility]

## 2022-05-31 ENCOUNTER — HOSPITAL ENCOUNTER (OUTPATIENT)
Dept: PHYSICAL THERAPY | Age: 72
End: 2022-05-31
Payer: MEDICARE

## 2022-05-31 ENCOUNTER — APPOINTMENT (OUTPATIENT)
Dept: PHYSICAL THERAPY | Age: 72
End: 2022-05-31
Payer: MEDICARE

## 2022-06-01 ENCOUNTER — APPOINTMENT (OUTPATIENT)
Dept: PHYSICAL THERAPY | Age: 72
End: 2022-06-01
Payer: MEDICARE

## 2022-06-01 ENCOUNTER — HOSPITAL ENCOUNTER (OUTPATIENT)
Dept: PHYSICAL THERAPY | Age: 72
Discharge: HOME | End: 2022-06-01
Payer: MEDICARE

## 2022-06-01 PROCEDURE — 97530 THERAPEUTIC ACTIVITIES: CPT

## 2022-06-01 PROCEDURE — 97110 THERAPEUTIC EXERCISES: CPT

## 2022-06-01 PROCEDURE — 97016 VASOPNEUMATIC DEVICE THERAPY: CPT

## 2022-06-01 NOTE — PROGRESS NOTES
[facility]  [address]  Ph [phone]  Fx [phone]    Physical Therapy Progress Note  Patient name: Glen Bhakta Start of Care:    Referral source: Selin Scott MD : [id number]   Medical/Treatment Diagnosis: Left knee pain [M25.562] Onset Date:2022   Prior Hospitalization: see medical history Provider#: [id number]   Medications: Verified on Patient Summary List    Comorbidities:  s/p right TKR in , cataract surgery, facial cyst removal, prostate issues (no cancer), hypertension, high cholesterol, sleep apnea  Prior Level of Function:  [x]??? Unrestricted with functional activities and ADLs  [x]? ?? No assistive device  [x]? ?? active lifestyle, []??? moderately active lifestyle, []??? sedentary lifestyle    Visits from Start of Care: 11    Missed Visits: 0    Goals/Measure of Progress:    Short Term Goals:    to be accomplished within 4 weeks:     Patient will report compliance with prescribed HEP(s) at least 1x/day in order to assist in maximizing therapeutic gains, reduce symptoms, and to progress towards overall prior function. Pre-Op: HEP issued and reviewed with patient, patient also educated in edema reduction techniques              Post-Op Reassess: Patient states he's been icing and elevating, but hasn't yet performed all of his HEP exercises.  Patient given another copy of his HEP today and was advised to please perform 3x/day to promote further ROM/strength/function.     22              Current: Patient reports continued daily compliance with his HEP (3x/day).   22, met     Patient will demonstrate at least 5-100 deg of left knee AROM post-operatively in order to return to prior functional activities and mobility.   Pre-Op AROM: 8-125 deg  Post-Op Reassess:  AROM: 7-87 deg; AAROM: 5-93 deg    22  Current: AROM: 2-120 deg 22 Met          Long Term Goals:     to be accomplished within 9 weeks:     Patient will score at least 64 points on FOTO in order to maximize function and promote patient satisfaction with overall outcome. (Dimitrios Heart is an established functional score where 100 = no disability)  Pre-Op FOTO: 52              Post-Op Reassess: 31   5/6/22              Current: 47     5/27/22, in progress     Patient will report less than 2/10 pain during all functional activities and ADLs in order to improve QOL and return to patient's PLOF. Post-Op Reassess: to be completed s/p surgery  Post-Op Reassess:  6-7/10 pain currently, with narcotics      5/6/22              Current: 6/10 at worst in the past week 6/1/22     Patient will demonstrate functional and painfree AROM of at least 0-120 deg in order to return to prior functional activities and mobility. Pre-Op AROM: 8-125 deg  Post-Op Reassess:  AROM: 7-87 deg; AAROM: 5-93 deg      5/6/22  Current: AROM: 2-120 deg 6/1/22     Patient will demonstrate at least 5/5 strength bilaterally in order to be able to safely perform functional activities and demonstrate improved stability and strength.              Pre-Op Strength: Grossly 5/5 bilaterally except bilateral hip flexion 4+/5  Post-Op Reassess:  (+) extensor lag with mild atrophy of left quad, did not formally test other musculature (will at later date once better AROM is achieved)      5/6/22              Current: Hip: Flex: 5/5 Sohan                                      Abd: 4+/5 Sohan                                      Ext: 4/5 Sohan                                      IR: Left: 4/5, Right: 4+/5                                      ER: Left: 4/5, Right:5/5                          Knee: Flex: Left: 4/5, Right: 5/5                                      Ext: Left: 4+/5, Right: 5/5 6/1/22        Patient will be able to safely ambulate community distances without and normal gait mechanics in order to improve overall mobility and return patient to his or her PLOF.   Eval: mildly antalgic, lacking bilateral TKE prior to heel strike (pre-op)  Post-Op Reassess:  With RW: stiff knee gait pattern including impaired heel/toe mechanics, lacking TKE prior to heel strike, decreased knee flexion during push off, and decrease knee flexion during swing, slow gait speed, WBOS     5/6/22  Current: Pt able to amb without SPC  With moderate gait speed and demonstrating reciprocal gait pattern with good heel strike bilaterally and slight deviation 6/1/22     Patient will be able to safely negotiate a full flight of stairs with a step-through pattern while holding onto at least one handrail in order to return to normal with this functional activity and improve safety on stairs. Eval: FF step-through pattern (pre-op)  Current: step throughout pattern with no UE for ascending and slight momentum compensation when weightbearing through Left LE but, single UE required for descending    6/1/22, in progress       Key Functional Changes: Pt arrived ambulating without SPC but, holding in hand. Pt demonstrated reciprocal gait with almost no abnormalities aside from moderate gait speed. Pt does continue to demonstrate slight lack of full knee extension but, has achieved 120 deg of knee flexion of AROM at this time. Pt also demonstrates improved strength since initial eval but, continues to demonstrate very slight extensor lag with affected knee as well as decreased strength with knee flexion and hip extension bilaterally. Pt demonstrated good stair navigation with reciprocal gait pattern but slight momentum compensation with ascending and UE support required for descending. Pt has made great progress since eval and has nearly returned to prior level of function but, continues to be limited by several deficits stated above. Pt would benefit from continued therapy to address these deficits and to return to prior level of function.        ASSESSMENT/RECOMMENDATIONS:  [x]Continue therapy per initial plan/protocol at a frequency of  2 x per week for 4 weeks  []Continue therapy with the following recommended changes:_____________________ _____________________________ ________________________________________  []Discontinue therapy progressing towards or have reached established goals  []Discontinue therapy due to lack of appreciable progress towards goals  []Discontinue therapy due to lack of attendance or compliance  []Await Physician's recommendations/decisions regarding therapy  []Other:________________________________________________________________    Thank you for this referral.   Neri Mcarthur PTA 6/1/2022 1:00 PM

## 2022-06-03 ENCOUNTER — HOSPITAL ENCOUNTER (OUTPATIENT)
Dept: PHYSICAL THERAPY | Age: 72
Discharge: HOME | End: 2022-06-03
Payer: MEDICARE

## 2022-06-03 ENCOUNTER — APPOINTMENT (OUTPATIENT)
Dept: PHYSICAL THERAPY | Age: 72
End: 2022-06-03
Payer: MEDICARE

## 2022-06-03 PROCEDURE — 97110 THERAPEUTIC EXERCISES: CPT

## 2022-06-03 PROCEDURE — 97016 VASOPNEUMATIC DEVICE THERAPY: CPT

## 2022-06-03 PROCEDURE — 97530 THERAPEUTIC ACTIVITIES: CPT

## 2022-06-03 PROCEDURE — 97535 SELF CARE MNGMENT TRAINING: CPT

## 2022-06-03 NOTE — PROGRESS NOTES
PT DAILY TREATMENT NOTE    Patient Name: Ling Michel  Date:6/3/2022  : 1950  [x]  Patient  Verified  Payor: [place] MEDICARE / Plan: [place] MEDICARE PART A & B / Product Type: Medicare /    In time:2:45  Out time:3:43  Total Treatment Time (min): 58  Total Timed Codes (min): 48  1:1 Treatment Time ([facility] only): 45   Visit #: 12 of 25    Treatment Dx: Left knee pain [M25.562]    SUBJECTIVE  Pain Level (0-10 scale): 2/10  Any medication changes, allergies to medications, adverse drug reactions, diagnosis change, or new procedure performed?: [x] No    [] Yes (see summary sheet for update)  Subjective functional status/changes:   [] No changes reported  \"I still have pain when I'm trying to sleep. \"    OBJECTIVE    Modalities Rationale:     decrease edema, decrease inflammation and decrease pain to improve patient's ability to return to prior level of physical activity. 10 min [x]  Vasopneumatic Device, press/temp: Med/Low   If using vaso (only need to measure limb vaso being performed on)      pre-treatment girth : 53 cm      post-treatment girth : 51.5 cm      measured at (landmark location) :  Mid-Patella   [x] Skin assessment post-treatment (if applicable):    [x]  intact    []  redness- no adverse reaction                  []redness - adverse reaction:             28 min Therapeutic Exercise:  [x] See flow sheet :   Rationale: increase ROM, increase strength and improve coordination to improve the patients ability to return to prior level of physical activity. 12 min Therapeutic Activity:  [x]  See flow sheet :   Rationale: increase ROM, increase strength and improve coordination  to improve the patients ability to return to prior level of physical activity. 8 min Self Care: Reviwed HEP and educated pt for transfer to other clinic   Rationale:    increase ROM, increase strength and improve coordination to improve the patients ability to return to prior level of physical activity.            With [] TE   [] TA   [] neuro   [] other: Patient Education: [x] Review HEP    [] Progressed/Changed HEP based on:   [] positioning   [] body mechanics   [] transfers   [] heat/ice application    [] other:      Other Objective/Functional Measures:      Pain Level (0-10 scale) post treatment: 0/10    ASSESSMENT/Changes in Function: Pt arrived reporting very mild pain in knee at this time. Pt tolerated session with no increased pain. PTA reviewed HEP and educated pt with transfer process to [facility]. Pt received VASO post tx for reduction of pain and swelling. Pt denied pain post tx. Patient will continue to benefit from skilled PT services to modify and progress therapeutic interventions, address functional mobility deficits, address ROM deficits, address strength deficits, analyze and address soft tissue restrictions, analyze and cue movement patterns, analyze and modify body mechanics/ergonomics and assess and modify postural abnormalities to attain remaining goals. [x]  See Plan of Care  []  See progress note/recertification  []  See Discharge Summary         Progress towards goals / Updated goals:  Short Term Goals:    to be accomplished within 4 weeks:     Patient will report compliance with prescribed HEP(s) at least 1x/day in order to assist in maximizing therapeutic gains, reduce symptoms, and to progress towards overall prior function.   Pre-Op: HEP issued and reviewed with patient, patient also educated in edema reduction techniques              Post-Op Reassess: Patient states he's been icing and elevating, but hasn't yet performed all of his HEP exercises.  Patient given another copy of his HEP today and was advised to please perform 3x/day to promote further ROM/strength/function.     5/6/22              Current: Patient reports continued daily compliance with his HEP (3x/day).   5/27/22, met     Patient will demonstrate at least 5-100 deg of left knee AROM post-operatively in order to return to prior functional activities and mobility. Pre-Op AROM: 8-125 deg  Post-Op Reassess:  AROM: 7-87 deg; AAROM: 5-93 deg    5/6/22  Last PN: AROM: 2-120 deg 6/1/22 Met          Long Term Goals:     to be accomplished within 9 weeks:     Patient will score at least 64 points on FOTO in order to maximize function and promote patient satisfaction with overall outcome. (Robert [place] is an established functional score where 100 = no disability)  Pre-Op FOTO: 52              Post-Op Reassess: 31   5/6/22              Last PN: 47     5/27/22, in progress     Patient will report less than 2/10 pain during all functional activities and ADLs in order to improve QOL and return to patient's PLOF. Post-Op Reassess: to be completed s/p surgery  Post-Op Reassess:  6-7/10 pain currently, with narcotics      5/6/22              Last PN: 6/10 at worst in the past week 6/1/22    Current: NA    Patient will demonstrate functional and painfree AROM of at least 0-120 deg in order to return to prior functional activities and mobility.   Pre-Op AROM: 8-125 deg  Post-Op Reassess:  AROM: 7-87 deg; AAROM: 5-93 deg      5/6/22  Last PN: AROM: 2-120 deg 6/1/22    Current: NA    Patient will demonstrate at least 5/5 strength bilaterally in order to be able to safely perform functional activities and demonstrate improved stability and strength.              Pre-Op Strength: Grossly 5/5 bilaterally except bilateral hip flexion 4+/5  Post-Op Reassess:  (+) extensor lag with mild atrophy of left quad, did not formally test other musculature (will at later date once better AROM is achieved)      5/6/22             Last PN: Hip: Flex: 5/5 Sohan                                      Abd: 4+/5 Sohan                                      Ext: 4/5 Sohan                                      IR: Left: 4/5, Right: 4+/5                                      ER: Left: 4/5, Right:5/5                          VFYA: Flex: Left: 4/5, Right: 5/5                                      Ext: Left: 4+/5, Right: 5/5 6/1/22    Current: NA       Patient will be able to safely ambulate community distances without and normal gait mechanics in order to improve overall mobility and return patient to his or her PLOF. Eval: mildly antalgic, lacking bilateral TKE prior to heel strike (pre-op)  Post-Op Reassess:  With RW: stiff knee gait pattern including impaired heel/toe mechanics, lacking TKE prior to heel strike, decreased knee flexion during push off, and decrease knee flexion during swing, slow gait speed, WBOS     5/6/22  Last PN: Pt able to amb without SPC  With moderate gait speed and demonstrating reciprocal gait pattern with good heel strike bilaterally and slight deviation 6/1/22    Current: NA    Patient will be able to safely negotiate a full flight of stairs with a step-through pattern while holding onto at least one handrail in order to return to normal with this functional activity and improve safety on stairs.   Eval: FF step-through pattern (pre-op)  Last PN: step throughout pattern with no UE for ascending and slight momentum compensation when weightbearing through Left LE but, single UE required for descending    6/1/22, in progress    Current: Pt demonstrates continued difficulty with controlled eccentric step down with affected knee, require at least single UE support 6/3/22    PLAN  [x]  Upgrade activities as tolerated     [x]  Continue plan of care  []  Update interventions per flow sheet       []  Discharge due to:_  []  Other:_      Amy Howell, PTA 6/3/2022  10:15 AM    Future Appointments   Date Time Provider Quincy Mari   6/3/2022  2:45 PM [facility]   6/6/2022  7:45 AM Fanny David, PT, DPT [facility]   6/8/2022  8:30 AM Fanny David PT, DPT [facility]   6/13/2022  7:45 AM Fanny David, PT, DPT [facility]   6/15/2022  3:45 PM Miranda Duque PT [facility]   6/20/2022  7:45 AM Fanny David PT, DPT [facility]   6/22/2022  8:30 AM Anila Obregon PT [facility]   6/24/2022  8:30 AM Rem[place], [place] [facility]   6/27/2022  7:45 AM Kelby Parker PT [facility]   6/29/2022  8:30 AM John Milan PT, DPT [facility]   7/1/2022  8:30 AM Remesic, [place] [facility]

## 2022-06-06 ENCOUNTER — HOSPITAL ENCOUNTER (OUTPATIENT)
Dept: PHYSICAL THERAPY | Age: 72
Discharge: HOME | End: 2022-06-06
Payer: MEDICARE

## 2022-06-06 ENCOUNTER — APPOINTMENT (OUTPATIENT)
Dept: PHYSICAL THERAPY | Age: 72
End: 2022-06-06
Payer: MEDICARE

## 2022-06-06 PROCEDURE — 97530 THERAPEUTIC ACTIVITIES: CPT

## 2022-06-06 PROCEDURE — 97112 NEUROMUSCULAR REEDUCATION: CPT

## 2022-06-06 PROCEDURE — 97016 VASOPNEUMATIC DEVICE THERAPY: CPT

## 2022-06-06 PROCEDURE — 97140 MANUAL THERAPY 1/> REGIONS: CPT

## 2022-06-06 PROCEDURE — 97110 THERAPEUTIC EXERCISES: CPT

## 2022-06-06 NOTE — PROGRESS NOTES
PT DAILY TREATMENT NOTE    Patient Name: Glen Bhakta  Date:2022  : 1950  [x]  Patient  Verified  Payor: Rosie Dawkins / Plan: [place] / Product Type: Medicare /    In time:7:50 am  Out time:9:06  Total Treatment Time (min): 66  Total Timed Codes (min): 56  1:1 Treatment Time (MC/BCBS only): 56   Visit #: 13 of 25    Treatment Dx: Left knee pain [M25.562]    SUBJECTIVE  Pain Level (0-10 scale): 2  Any medication changes, allergies to medications, adverse drug reactions, diagnosis change, or new procedure performed?: [x] No    [] Yes (see summary sheet for update)  Subjective functional status/changes:   [] No changes reported  \"I am glad to be here. My knee is good but we have to talk about my leg. \"  Reported that is still having pain into     OBJECTIVE    Modalities Rationale:     decrease inflammation and decrease pain to improve patient's ability to perform daily activities with decreased pain and symptom levels     min [] Estim, type/location:                                      []  att     []  unatt     []  w/US     []  w/ice    []  w/heat    min []  Mechanical Traction: type/lbs                   []  pro   []  sup   []  int   []  cont    []  before manual    []  after manual    min []  Ultrasound, settings/location:      min []  Iontophoresis w/ dexamethasone, location:                                               []  take home patch       []  in clinic    min []  Ice     []  Heat    location/position:    10 min [x]  Vasopneumatic Device, press/temp:  To left knee in supine with LE elevated     If using vaso (only need to measure limb vaso being performed on)      pre-treatment girth : 43.2 cm      post-treatment girth : 42.1 cm      measured at (landmark location) :  Mid patella     min []  Other:    [x] Skin assessment post-treatment (if applicable):    [x]  intact    []  redness- no adverse reaction                  []redness - adverse reaction:        16 min Therapeutic Exercise:  [x] See flow sheet :   Rationale: increase ROM, increase strength, improve coordination and increase proprioception to improve the patients ability to perform daily activities with decreased pain and symptom levels      15 min Therapeutic Activity:  [x]  See flow sheet : discussion of causes of inflammation and edema as well as mechanical causes of pain   Recommended compression socks at night due to visible persistent LE swelling especially at ankle    Rationale: increase ROM, increase strength, improve coordination, improve balance and increase proprioception  to improve the patients ability to perform daily activities with decreased pain and symptom levels       25 min Neuromuscular Re-education:  [x]  See flow sheet :   Rationale: increase ROM, increase strength, improve coordination, improve balance and increase proprioception  to improve the patients ability to perform daily activities with decreased pain and symptom levels      10 min Manual Therapy:  Gentle manual stretching of left posterior hip capsule in right S/L  Functional massage to left proximal gastroc, distal hamstrings and distal quad  Gentle retrograde massage to left ankle and calf   Rationale: decrease pain, increase ROM, increase tissue extensibility, decrease trigger points and increase postural awareness to improve the patients ability to perform daily activities with decreased pain and symptom levels    The manual therapy interventions were performed at a separate and distinct time from the therapeutic activities interventions.           With   [] TE   [] TA   [] neuro   [] other: Patient Education: [x] Review HEP    [] Progressed/Changed HEP based on:   [] positioning   [] body mechanics   [] transfers   [] heat/ice application    [] other:      Other Objective/Functional Measures:   Left Knee AROM: 3-121*, post session: 0-121*    KORI Special Tests (pre/post)  Hip Add Drop Test:                           Right: +/+w/ drop Left:+/+ w/ drop  Passive Abduction raise  Right: +   Left +  Trunk Rot                                            Right: 18 in      Left 19 in            Pain Level (0-10 scale) post treatment: 1    ASSESSMENT/Changes in Function:   Pt seen at [facility] after transferring from [facility]. Pt reported overall feels pretty good, primary complaint has been left tibia and ankle as well as left adductor. Noted continued visible swelling and edema at left ankle. Discussed importance of compression. reports that left medial ankle along posterior tib increases as day progresses. Reported decrease in pain post session. Noted Ext improved post session as well. Pt could potentially benefit from more supportive foot wear for calcaneous support and position. Patient will continue to benefit from skilled PT services to modify and progress therapeutic interventions, address functional mobility deficits, address ROM deficits, address strength deficits, analyze and address soft tissue restrictions, analyze and cue movement patterns, analyze and modify body mechanics/ergonomics, assess and modify postural abnormalities and instruct in home and community integration to attain remaining goals. [x]  See Plan of Care  []  See progress note/recertification  []  See Discharge Summary         Progress towards goals / Updated goals:  Short Term Goals:    to be accomplished within 4 weeks:     Patient will report compliance with prescribed HEP(s) at least 1x/day in order to assist in maximizing therapeutic gains, reduce symptoms, and to progress towards overall prior function.   Pre-Op: HEP issued and reviewed with patient, patient also educated in edema reduction techniques              Post-Op Reassess: Patient states he's been icing and elevating, but hasn't yet performed all of his HEP exercises.  Patient given another copy of his HEP today and was advised to please perform 3x/day to promote further ROM/strength/function.     5/6/22              Current: Patient reports continued daily compliance with his HEP (3x/day).   5/27/22, met     Patient will demonstrate at least 5-100 deg of left knee AROM post-operatively in order to return to prior functional activities and mobility. Pre-Op AROM: 8-125 deg  Post-Op Reassess:  AROM: 7-87 deg; AAROM: 5-93 deg    5/6/22  Last PN: AROM: 2-120 deg 6/1/22 Met          Long Term Goals:     to be accomplished within 9 weeks:     Patient will score at least 64 points on FOTO in order to maximize function and promote patient satisfaction with overall outcome. (Polina Dat is an established functional score where 100 = no disability)  Pre-Op FOTO: 52              Post-Op Reassess: 31   5/6/22              Last PN: 47     5/27/22, in progress     Patient will report less than 2/10 pain during all functional activities and ADLs in order to improve QOL and return to patient's PLOF. Post-Op Reassess: to be completed s/p surgery  Post-Op Reassess:  6-7/10 pain currently, with narcotics      5/6/22              Last PN: 6/10 at worst in the past week 6/1/22              Current: NA     Patient will demonstrate functional and painfree AROM of at least 0-120 deg in order to return to prior functional activities and mobility.   Pre-Op AROM: 8-125 deg  Post-Op Reassess:  AROM: 7-87 deg; AAROM: 5-93 deg      5/6/22  Last PN: AROM: 2-120 deg 6/1/22              Current: Progressing 6/6/22 Left Knee AROM: 3-121*, post session: 0-121*     Patient will demonstrate at least 5/5 strength bilaterally in order to be able to safely perform functional activities and demonstrate improved stability and strength.              Pre-Op Strength: Grossly 5/5 bilaterally except bilateral hip flexion 4+/5  Post-Op Reassess:  (+) extensor lag with mild atrophy of left quad, did not formally test other musculature (will at later date once better AROM is achieved)      5/6/22             Last PN: Hip: Flex: 5/5 Sohan                                      DWE: 4+/5 Sohan                                      Ext: 4/5 Sohan                                      IR: Left: 4/5, Right: 4+/5                                      ER: Left: 4/5, Right:5/5                          GOMA: Flex: Left: 4/5, Right: 5/5                                      Ext: Left: 4+/5, Right: 5/5 6/1/22              Current: NA      Patient will be able to safely ambulate community distances without and normal gait mechanics in order to improve overall mobility and return patient to his or her PLOF. Eval: mildly antalgic, lacking bilateral TKE prior to heel strike (pre-op)  Post-Op Reassess:  With RW: stiff knee gait pattern including impaired heel/toe mechanics, lacking TKE prior to heel strike, decreased knee flexion during push off, and decrease knee flexion during swing, slow gait speed, WBOS     5/6/22  Last PN: Pt able to amb without SPC  With moderate gait speed and demonstrating reciprocal gait pattern with good heel strike bilaterally and slight deviation 6/1/22              Current: NA     Patient will be able to safely negotiate a full flight of stairs with a step-through pattern while holding onto at least one handrail in order to return to normal with this functional activity and improve safety on stairs.   Eval: FF step-through pattern (pre-op)  Last PN: step throughout pattern with no UE for ascending and slight momentum compensation when weightbearing through Left LE but, single UE required for descending    6/1/22, in progress              Current: Pt demonstrates continued difficulty with controlled eccentric step down with affected knee, require at least single UE support 6/3/22    PLAN  []  Upgrade activities as tolerated     [x]  Continue plan of care  []  Update interventions per flow sheet       []  Discharge due to:_  []  Other:_      Sarah Urias, PT, DPT 6/6/2022  8:06 AM    Future Appointments   Date Time Provider Quincy Mari 6/8/2022  8:30 AM Varsha Zambrano PT, DPT [facility]   6/13/2022  7:45 AM Varsha Zambrano PT, DPT [facility]   6/15/2022  3:45 PM Keshawn Dallas PT [facility]   6/20/2022  7:45 AM Varsha Zambrano PT, DPT [facility]   6/22/2022  8:30 AM Leslie Rubio PT [facility]   6/24/2022  8:30 AM Carmen William [facility]   6/27/2022  7:45 AM Keshawn Dallas PT [facility]   6/29/2022  8:30 AM Varsha Zambrano PT, DPT [facility]   7/1/2022  8:30 AM Carmen William [facility]

## 2022-06-08 ENCOUNTER — APPOINTMENT (OUTPATIENT)
Dept: PHYSICAL THERAPY | Age: 72
End: 2022-06-08
Payer: MEDICARE

## 2022-06-08 ENCOUNTER — HOSPITAL ENCOUNTER (OUTPATIENT)
Dept: PHYSICAL THERAPY | Age: 72
Discharge: HOME | End: 2022-06-08
Payer: MEDICARE

## 2022-06-08 PROCEDURE — 97140 MANUAL THERAPY 1/> REGIONS: CPT

## 2022-06-08 PROCEDURE — 97016 VASOPNEUMATIC DEVICE THERAPY: CPT

## 2022-06-08 PROCEDURE — 97112 NEUROMUSCULAR REEDUCATION: CPT

## 2022-06-08 PROCEDURE — 97530 THERAPEUTIC ACTIVITIES: CPT

## 2022-06-08 PROCEDURE — 97110 THERAPEUTIC EXERCISES: CPT

## 2022-06-08 NOTE — PROGRESS NOTES
PT DAILY TREATMENT NOTE    Patient Name: Nettie Young  Date:2022  : 1950  [x]  Patient  Verified  Payor: Salvatore  / Plan: [place] MEDICARE PART A & B / Product Type: Medicare /    In time:8:27 am  Out time:9:30 am   Total Treatment Time (min): 63  Total Timed Codes (min): 53  1:1 Treatment Time (MC/BCBS only): 48   Visit #: 14 of 25    Treatment Dx: Left knee pain [M25.562]    SUBJECTIVE  Pain Level (0-10 scale): 0  Any medication changes, allergies to medications, adverse drug reactions, diagnosis change, or new procedure performed?: [x] No    [] Yes (see summary sheet for update)  Subjective functional status/changes:   [] No changes reported  \"just stiffness. \"  Pt was wearing compression sock on the left LE - reported felt better with compression. Continued pain in region of left posterior tib. OBJECTIVE    Modalities Rationale:     decrease inflammation and decrease pain to improve patient's ability to perform daily activities with decreased pain and symptom levels     min [] Estim, type/location:                                      []  att     []  unatt     []  w/US     []  w/ice    []  w/heat    min []  Mechanical Traction: type/lbs                   []  pro   []  sup   []  int   []  cont    []  before manual    []  after manual    min []  Ultrasound, settings/location:      min []  Iontophoresis w/ dexamethasone, location:                                               []  take home patch       []  in clinic    min []  Ice     []  Heat    location/position:    10 min [x]  Vasopneumatic Device, press/temp:  To left knee in supine with LE elevated     If using vaso (only need to measure limb vaso being performed on)      pre-treatment girth : 43 cm      post-treatment girth : 41.8 cm      measured at (landmark location) :  Mid patella     min []  Other:    [x] Skin assessment post-treatment (if applicable):    [x]  intact    []  redness- no adverse reaction                  []redness - adverse reaction:        13 min Therapeutic Exercise:  [x] See flow sheet :   Rationale: increase ROM, increase strength, improve coordination and increase proprioception to improve the patients ability to perform daily activities with decreased pain and symptom levels      12 min Therapeutic Activity:  [x]  See flow sheet :   Rationale: increase ROM, increase strength, improve coordination and increase proprioception  to improve the patients ability to perform daily activities with decreased pain and symptom levels       20 min Neuromuscular Re-education:  [x]  See flow sheet :   Rationale: increase ROM, increase strength, improve coordination, improve balance and increase proprioception  to improve the patients ability to perform daily activities with decreased pain and symptom levels      8 min Manual Therapy:   Gentle manual stretching of left posterior hip capsule in right S/L  Functional massage to left proximal gastroc, distal hamstrings and distal quad  Gentle tibial ER with functional massage to proximal gastroc    Rationale: decrease pain, increase ROM, increase tissue extensibility, decrease trigger points and increase postural awareness to improve the patients ability to perform daily activities with decreased pain and symptom levels    The manual therapy interventions were performed at a separate and distinct time from the therapeutic activities interventions. With   [] TE   [x] TA   [x] neuro   [] other: Patient Education: [x] Review HEP    [] Progressed/Changed HEP based on:   [] positioning   [] body mechanics   [] transfers   [] heat/ice application    [] other:      Other Objective/Functional Measures:   Left Knee AROM: 2-122*     Pain Level (0-10 scale) post treatment: 0    ASSESSMENT/Changes in Function:   Visibly decreased swelling with addition of compression sock. Pt reported being able to sleep 6 hours last night without being disturbed by pain. Updated HEP.  Pt reported no pain at left posterior tib post session. Reported feeling \"a lot of things\" at left posterior hip. Patient will continue to benefit from skilled PT services to modify and progress therapeutic interventions, address functional mobility deficits, address ROM deficits, address strength deficits, analyze and address soft tissue restrictions, analyze and cue movement patterns, analyze and modify body mechanics/ergonomics, assess and modify postural abnormalities and instruct in home and community integration to attain remaining goals. [x]  See Plan of Care  []  See progress note/recertification  []  See Discharge Summary         Progress towards goals / Updated goals:  Short Term Goals:    to be accomplished within 4 weeks:     Patient will report compliance with prescribed HEP(s) at least 1x/day in order to assist in maximizing therapeutic gains, reduce symptoms, and to progress towards overall prior function. Pre-Op: HEP issued and reviewed with patient, patient also educated in edema reduction techniques              Post-Op Reassess: Patient states he's been icing and elevating, but hasn't yet performed all of his HEP exercises.  Patient given another copy of his HEP today and was advised to please perform 3x/day to promote further ROM/strength/function.     5/6/22              Current: Patient reports continued daily compliance with his HEP (3x/day).   5/27/22, met     Patient will demonstrate at least 5-100 deg of left knee AROM post-operatively in order to return to prior functional activities and mobility. Pre-Op AROM: 8-125 deg  Post-Op Reassess:  AROM: 7-87 deg; AAROM: 5-93 deg    5/6/22  Last PN: AROM: 2-120 deg 6/1/22 Met          Long Term Goals:     to be accomplished within 9 weeks:     Patient will score at least 64 points on FOTO in order to maximize function and promote patient satisfaction with overall outcome.   (Longo Basil is an established functional score where 100 = no disability)  Pre-Op FOTO: 46              Post-Op Reassess: 31   5/6/22              Last PN: 47     5/27/22, in progress     Patient will report less than 2/10 pain during all functional activities and ADLs in order to improve QOL and return to patient's PLOF. Post-Op Reassess: to be completed s/p surgery  Post-Op Reassess:  6-7/10 pain currently, with narcotics      5/6/22              Last PN: 6/10 at worst in the past week 6/1/22              Current: NA     Patient will demonstrate functional and painfree AROM of at least 0-120 deg in order to return to prior functional activities and mobility. Pre-Op AROM: 8-125 deg  Post-Op Reassess:  AROM: 7-87 deg; AAROM: 5-93 deg      5/6/22  Last PN: AROM: 2-120 deg 6/1/22              Current: Progressing 6/8/22 Left Knee AROM: 3-122*, post session: 2-122*     Patient will demonstrate at least 5/5 strength bilaterally in order to be able to safely perform functional activities and demonstrate improved stability and strength.              Pre-Op Strength: Grossly 5/5 bilaterally except bilateral hip flexion 4+/5  Post-Op Reassess:  (+) extensor lag with mild atrophy of left quad, did not formally test other musculature (will at later date once better AROM is achieved)      5/6/22             Last PN: Hip: Flex: 5/5 Sohan                                      Abd: 4+/5 Sohan                                      Ext: 4/5 Sohan                                      IR: Left: 4/5, Right: 4+/5                                      ER: Left: 4/5, Right:5/5                          YVTW: Flex: Left: 4/5, Right: 5/5                                      Ext: Left: 4+/5, Right: 5/5 6/1/22              Current: NA      Patient will be able to safely ambulate community distances without and normal gait mechanics in order to improve overall mobility and return patient to his or her PLOF.   Eval: mildly antalgic, lacking bilateral TKE prior to heel strike (pre-op)  Post-Op Reassess:  With RW: stiff knee gait pattern including impaired heel/toe mechanics, lacking TKE prior to heel strike, decreased knee flexion during push off, and decrease knee flexion during swing, slow gait speed, WBOS     5/6/22  Last PN: Pt able to amb without SPC  With moderate gait speed and demonstrating reciprocal gait pattern with good heel strike bilaterally and slight deviation 6/1/22              Current: NA     Patient will be able to safely negotiate a full flight of stairs with a step-through pattern while holding onto at least one handrail in order to return to normal with this functional activity and improve safety on stairs.   Eval: FF step-through pattern (pre-op)  Last PN: step throughout pattern with no UE for ascending and slight momentum compensation when weightbearing through Left LE but, single UE required for descending    6/1/22, in progress              Current: Pt demonstrates continued difficulty with controlled eccentric step down with affected knee, require at least single UE support 6/3/22    PLAN  []  Upgrade activities as tolerated     [x]  Continue plan of care  []  Update interventions per flow sheet       []  Discharge due to:_  []  Other:_      Letty Tatum, PT, DPT 6/8/2022  7:58 AM    Future Appointments   Date Time Provider Quincy Mari   6/8/2022  8:30 AM Vj Jem, PT, DPT [facility]   6/13/2022  7:45 AM Vj Jem, PT, [facility]   6/15/2022  3:45 PM Valentin Elena PT [facility]   6/20/2022  7:45 AM Vj Jem, PT, DPT [facility]   6/22/2022  8:30 AM Andres Mann PT [facility]   6/24/2022  8:30 AM China William [facility]   6/27/2022  7:45 AM Valentin Elena PT [facility]   6/29/2022  8:30 AM Vj Jem, PT, DPT [facility]   7/1/2022  8:30 AM China [facility]

## 2022-06-10 ENCOUNTER — HOSPITAL ENCOUNTER (OUTPATIENT)
Dept: PHYSICAL THERAPY | Age: 72
Discharge: HOME | End: 2022-06-10
Payer: MEDICARE

## 2022-06-10 ENCOUNTER — APPOINTMENT (OUTPATIENT)
Dept: PHYSICAL THERAPY | Age: 72
End: 2022-06-10
Payer: MEDICARE

## 2022-06-10 PROCEDURE — 97112 NEUROMUSCULAR REEDUCATION: CPT

## 2022-06-10 PROCEDURE — 97140 MANUAL THERAPY 1/> REGIONS: CPT

## 2022-06-10 PROCEDURE — 97530 THERAPEUTIC ACTIVITIES: CPT

## 2022-06-10 PROCEDURE — 97016 VASOPNEUMATIC DEVICE THERAPY: CPT

## 2022-06-10 NOTE — PROGRESS NOTES
PT DAILY TREATMENT NOTE    Patient Name: Bang Sanders  Date:6/10/2022  : 1950  [x]  Patient  Verified  Payor: Amada Calender / Plan: [place] / Product Type: Medicare /    In time:1:02 pm  Out time:2:13 pm  Total Treatment Time (min): 71  Total Timed Codes (min): 61  1:1 Treatment Time (MC/BCBS only): 50   Visit #: 15 of 25    Treatment Dx: Left knee pain [M25.562]    SUBJECTIVE  Pain Level (0-10 scale): 2  Any medication changes, allergies to medications, adverse drug reactions, diagnosis change, or new procedure performed?: [x] No    [] Yes (see summary sheet for update)  Subjective functional status/changes:   [] No changes reported  Reported lateral left knee pain over last 24 hours.      OBJECTIVE    Modalities Rationale:     decrease inflammation and decrease pain to improve patient's ability to perform daily activities with decreased pain and symptom levels     min [] Estim, type/location:                                      []  att     []  unatt     []  w/US     []  w/ice    []  w/heat    min []  Mechanical Traction: type/lbs                   []  pro   []  sup   []  int   []  cont    []  before manual    []  after manual    min []  Ultrasound, settings/location:      min []  Iontophoresis w/ dexamethasone, location:                                               []  take home patch       []  in clinic    min []  Ice     []  Heat    location/position:    10 min [x]  Vasopneumatic Device, press/temp: To Left Knee in supine with LE elevated     If using vaso (only need to measure limb vaso being performed on)      pre-treatment girth : 41.7 cm      post-treatment girth : 39.5 cm      measured at (landmark location) :  Mid patella     min []  Other:    [x] Skin assessment post-treatment (if applicable):    [x]  intact    []  redness- no adverse reaction                  []redness - adverse reaction:        10 min Therapeutic Exercise:  [x] See flow sheet :   Rationale: increase ROM, increase strength, improve coordination and increase proprioception to improve the patients ability to perform daily activities with decreased pain and symptom levels        15 min Therapeutic Activity:  [x]  See flow sheet :   Rationale: increase ROM, increase strength, improve coordination, improve balance and increase proprioception  to improve the patients ability to perform daily activities with decreased pain and symptom levels       16 min Neuromuscular Re-education:  [x]  See flow sheet :   Rationale: increase ROM, increase strength, improve coordination, improve balance and increase proprioception  to improve the patients ability to perform daily activities with decreased pain and symptom levels      10 min Manual Therapy:   Gentle manual stretching of left posterior hip capsule in right S/L  Functional massage to left proximal gastroc, distal hamstrings and distal quad  Gentle tibial ER with functional massage to proximal gastroc    Rationale: decrease pain, increase ROM, increase tissue extensibility, decrease trigger points and increase postural awareness to improve the patients ability to perform daily activities with decreased pain and symptom levels    The manual therapy interventions were performed at a separate and distinct time from the therapeutic activities interventions. With   [] TE   [] TA   [] neuro   [] other: Patient Education: [x] Review HEP    [] Progressed/Changed HEP based on:   [] positioning   [] body mechanics   [] transfers   [] heat/ice application    [] other:      Other Objective/Functional Measures:   Visible decreased effusion and swelling  Full ext in long sit post hip capsule inhibition     Pain Level (0-10 scale) post treatment: 0    ASSESSMENT/Changes in Function:   Pt tolerated session well. Discussed doing 90-90 hip shift and long sit post hip capsule inhibition prior to bed and walking. Primary complaint is of lateral knee pain.  Reports decreasing left medial ankle along post tib tendon. Overall pt is progressing nicely. Patient will continue to benefit from skilled PT services to modify and progress therapeutic interventions, address functional mobility deficits, address ROM deficits, address strength deficits, analyze and address soft tissue restrictions, analyze and cue movement patterns, analyze and modify body mechanics/ergonomics, assess and modify postural abnormalities and instruct in home and community integration to attain remaining goals. [x]  See Plan of Care  []  See progress note/recertification  []  See Discharge Summary         Progress towards goals / Updated goals:  Short Term Goals:    to be accomplished within 4 weeks:     Patient will report compliance with prescribed HEP(s) at least 1x/day in order to assist in maximizing therapeutic gains, reduce symptoms, and to progress towards overall prior function. Pre-Op: HEP issued and reviewed with patient, patient also educated in edema reduction techniques              Post-Op Reassess: Patient states he's been icing and elevating, but hasn't yet performed all of his HEP exercises.  Patient given another copy of his HEP today and was advised to please perform 3x/day to promote further ROM/strength/function.     5/6/22              Current: Patient reports continued daily compliance with his HEP (3x/day).   5/27/22, met     Patient will demonstrate at least 5-100 deg of left knee AROM post-operatively in order to return to prior functional activities and mobility. Pre-Op AROM: 8-125 deg  Post-Op Reassess:  AROM: 7-87 deg; AAROM: 5-93 deg    5/6/22  Last PN: AROM: 2-120 deg 6/1/22 Met          Long Term Goals:     to be accomplished within 9 weeks:     Patient will score at least 64 points on FOTO in order to maximize function and promote patient satisfaction with overall outcome.   (Missy Brighter is an established functional score where 100 = no disability)  Pre-Op FOTO: 52              Post-Op Reassess: 31   5/6/22              Last PN: 47     5/27/22, in progress   Current: 6/10/22 - address next session unable to log in      Patient will report less than 2/10 pain during all functional activities and ADLs in order to improve QOL and return to patient's PLOF. Post-Op Reassess: to be completed s/p surgery  Post-Op Reassess:  6-7/10 pain currently, with narcotics      5/6/22              Last PN: 6/10 at worst in the past week 6/1/22              Current: NA     Patient will demonstrate functional and painfree AROM of at least 0-120 deg in order to return to prior functional activities and mobility. Pre-Op AROM: 8-125 deg  Post-Op Reassess:  AROM: 7-87 deg; AAROM: 5-93 deg      5/6/22  Last PN: AROM: 2-120 deg 6/1/22              Current: Progressing 6/8/22 Left Knee AROM: 3-122*, post session: 2-122*     Patient will demonstrate at least 5/5 strength bilaterally in order to be able to safely perform functional activities and demonstrate improved stability and strength.              Pre-Op Strength: Grossly 5/5 bilaterally except bilateral hip flexion 4+/5  Post-Op Reassess:  (+) extensor lag with mild atrophy of left quad, did not formally test other musculature (will at later date once better AROM is achieved)      5/6/22             Last PN: Hip: Flex: 5/5 Sohan                                      Abd: 4+/5 Sohan                                      Ext: 4/5 Sohan                                      IR: Left: 4/5, Right: 4+/5                                      ER: Left: 4/5, Right:5/5                          WBOQ: Flex: Left: 4/5, Right: 5/5                                      Ext: Left: 4+/5, Right: 5/5 6/1/22              Current: NA      Patient will be able to safely ambulate community distances without and normal gait mechanics in order to improve overall mobility and return patient to his or her PLOF.   Eval: mildly antalgic, lacking bilateral TKE prior to heel strike (pre-op)  Post-Op Reassess:  With RW: stiff knee gait pattern including impaired heel/toe mechanics, lacking TKE prior to heel strike, decreased knee flexion during push off, and decrease knee flexion during swing, slow gait speed, WBOS     5/6/22  Last PN: Pt able to amb without SPC  With moderate gait speed and demonstrating reciprocal gait pattern with good heel strike bilaterally and slight deviation 6/1/22              Current: progressing 6/10/22 discussed starting walking progrma at 5-10 min and increase as tolerance allows      Patient will be able to safely negotiate a full flight of stairs with a step-through pattern while holding onto at least one handrail in order to return to normal with this functional activity and improve safety on stairs.   Eval: FF step-through pattern (pre-op)  Last PN: step throughout pattern with no UE for ascending and slight momentum compensation when weightbearing through Left LE but, single UE required for descending    6/1/22, in progress              Current: Progressing 6/10/22 added BOSU step ups with good tolerance and will add heel downs next session     PLAN  []  Upgrade activities as tolerated     [x]  Continue plan of care  []  Update interventions per flow sheet       []  Discharge due to:_  []  Other:_      Rupal Huber PT, DPT 6/10/2022  1:24 PM    Future Appointments   Date Time Provider Quin[place] Mari   6/13/2022  7:45 AM Tatiana Wilson PT, DPT [facility]   6/15/2022  3:45 PM Saintclair Ply, PT [facility]   6/20/2022  7:45 AM Tatiana Wilson PT, DPT [facility]   6/22/2022  8:30 AM Rahul Abreu PT [facility]   6/24/2022  8:30 AM Anoop William [facility]   6/27/2022  7:45 AM Saintclair Ply, PT [facility]   6/29/2022  8:30 AM Tatiana Wilson PT, DPT [facility]   7/1/2022  8:30 AM Anoop William [facility]

## 2022-06-13 ENCOUNTER — HOSPITAL ENCOUNTER (OUTPATIENT)
Dept: PHYSICAL THERAPY | Age: 72
Discharge: HOME | End: 2022-06-13
Payer: MEDICARE

## 2022-06-13 ENCOUNTER — APPOINTMENT (OUTPATIENT)
Dept: PHYSICAL THERAPY | Age: 72
End: 2022-06-13
Payer: MEDICARE

## 2022-06-13 PROCEDURE — 97110 THERAPEUTIC EXERCISES: CPT

## 2022-06-13 PROCEDURE — 97112 NEUROMUSCULAR REEDUCATION: CPT

## 2022-06-13 PROCEDURE — 97140 MANUAL THERAPY 1/> REGIONS: CPT

## 2022-06-13 PROCEDURE — 97530 THERAPEUTIC ACTIVITIES: CPT

## 2022-06-13 PROCEDURE — 97016 VASOPNEUMATIC DEVICE THERAPY: CPT

## 2022-06-13 NOTE — PROGRESS NOTES
PT DAILY TREATMENT NOTE    Patient Name: Peterson Arora  Date:2022  : 1950  [x]  Patient  Verified  Payor: Lisseth Clayton / Plan: [place] PART A [place] B / Product Type: Medicare /    In time:7:40 am  Out time:8:45 am   Total Treatment Time (min): 65  Total Timed Codes (min): 55  1:1 Treatment Time (MC/BCBS only): 55   Visit #: 16 of 25    Treatment Dx: Left knee pain [M25.562]    SUBJECTIVE  Pain Level (0-10 scale): 2  Any medication changes, allergies to medications, adverse drug reactions, diagnosis change, or new procedure performed?: [x] No    [] Yes (see summary sheet for update)  Subjective functional status/changes:   [] No changes reported  \"Just a little sore. \"  Reports that descending stairs is still challenging, Improved ability to sleep at night. OBJECTIVE    Modalities Rationale:     decrease inflammation and decrease pain to improve patient's ability to perform daily activities with decreased pain and symptom levels     min [] Estim, type/location:                                      []  att     []  unatt     []  w/US     []  w/ice    []  w/heat    min []  Mechanical Traction: type/lbs                   []  pro   []  sup   []  int   []  cont    []  before manual    []  after manual    min []  Ultrasound, settings/location:      min []  Iontophoresis w/ dexamethasone, location:                                               []  take home patch       []  in clinic    min []  Ice     []  Heat    location/position:    10 min [x]  Vasopneumatic Device, press/temp:  To left knee in supine with LE elevated     If using vaso (only need to measure limb vaso being performed on)      pre-treatment girth : 40.8 cm      post-treatment girth : 40.5 cm      measured at (landmark location) :  Mid patella     min []  Other:    [x] Skin assessment post-treatment (if applicable):    [x]  intact    []  redness- no adverse reaction                  []redness - adverse reaction:        20 min Therapeutic Exercise:  [x] See flow sheet :   Rationale: increase ROM, increase strength, improve coordination and increase proprioception to improve the patients ability to perform daily activities with decreased pain and symptom levels      10 min Therapeutic Activity:  [x]  See flow sheet :   Rationale: increase ROM, increase strength, improve coordination, improve balance and increase proprioception  to improve the patients ability to perform daily activities with decreased pain and symptom levels       20 min Neuromuscular Re-education:  [x]  See flow sheet :   Rationale: increase ROM, increase strength, improve coordination, improve balance and increase proprioception  to improve the patients ability to perform daily activities with decreased pain and symptom levels      10 min Manual Therapy:  Functional massage to left proximal gastroc, distal hamstrings and distal quad  Gentle tibial ER with functional massage to proximal gastroc   Gentle scar massage and cross friction to incision    Rationale: decrease pain, increase ROM, increase tissue extensibility, decrease trigger points and increase postural awareness to improve the patients ability to perform daily activities with decreased pain and symptom levels    The manual therapy interventions were performed at a separate and distinct time from the therapeutic activities interventions. With   [] TE   [] TA   [] neuro   [] other: Patient Education: [x] Review HEP    [] Progressed/Changed HEP based on:   [] positioning   [] body mechanics   [] transfers   [] heat/ice application    [] other:      Other Objective/Functional Measures:   Left Knee AROM: 1-123*  Good quad set  Incision is well healed - no sac formation, thickening at incision proximal to patella      Pain Level (0-10 scale) post treatment: 0    ASSESSMENT/Changes in Function:   Pt was challenged with addition of eccentric heel downs at well.  Required increase tactile cues and band facilitation with heel downs. Added TKE to increase stability in extension. Reports improved ability to sleep at night with exercises prior to sleeping. Patient will continue to benefit from skilled PT services to modify and progress therapeutic interventions, address functional mobility deficits, address ROM deficits, address strength deficits, analyze and address soft tissue restrictions, analyze and cue movement patterns, analyze and modify body mechanics/ergonomics, assess and modify postural abnormalities, address imbalance/dizziness and instruct in home and community integration to attain remaining goals. [x]  See Plan of Care  []  See progress note/recertification  []  See Discharge Summary         Progress towards goals / Updated goals:  Short Term Goals:    to be accomplished within 4 weeks:     Patient will report compliance with prescribed HEP(s) at least 1x/day in order to assist in maximizing therapeutic gains, reduce symptoms, and to progress towards overall prior function. Pre-Op: HEP issued and reviewed with patient, patient also educated in edema reduction techniques              Post-Op Reassess: Patient states he's been icing and elevating, but hasn't yet performed all of his HEP exercises.  Patient given another copy of his HEP today and was advised to please perform 3x/day to promote further ROM/strength/function.     5/6/22              Current: Patient reports continued daily compliance with his HEP (3x/day).   5/27/22, met     Patient will demonstrate at least 5-100 deg of left knee AROM post-operatively in order to return to prior functional activities and mobility. Pre-Op AROM: 8-125 deg  Post-Op Reassess:  AROM: 7-87 deg; AAROM: 5-93 deg    5/6/22  Last PN: AROM: 2-120 deg 6/1/22 Met          Long Term Goals:     to be accomplished within 9 weeks:     Patient will score at least 64 points on FOTO in order to maximize function and promote patient satisfaction with overall outcome.   (Zuleima Sotomayor is an established functional score where 100 = no disability)  Pre-Op FOTO: 52              Post-Op Reassess: 31   5/6/22              Last PN: 47     5/27/22, in progress              Current: 6/13/22 Progressing 49     Patient will report less than 2/10 pain during all functional activities and ADLs in order to improve QOL and return to patient's PLOF. Post-Op Reassess: to be completed s/p surgery  Post-Op Reassess:  6-7/10 pain currently, with narcotics      5/6/22              Last PN: 6/10 at worst in the past week 6/1/22              Current: NA     Patient will demonstrate functional and painfree AROM of at least 0-120 deg in order to return to prior functional activities and mobility. Pre-Op AROM: 8-125 deg  Post-Op Reassess:  AROM: 7-87 deg; AAROM: 5-93 deg      5/6/22  Last PN: AROM: 2-120 deg 6/1/22              Current: Progressing 6/13/22 Left Knee AROM: 1-123*,     Patient will demonstrate at least 5/5 strength bilaterally in order to be able to safely perform functional activities and demonstrate improved stability and strength.              Pre-Op Strength: Grossly 5/5 bilaterally except bilateral hip flexion 4+/5  Post-Op Reassess:  (+) extensor lag with mild atrophy of left quad, did not formally test other musculature (will at later date once better AROM is achieved)      5/6/22             Last PN: Hip: Flex: 5/5 Sohan                                      Abd: 4+/5 Sohan                                      Ext: 4/5 Sohan                                      IR: Left: 4/5, Right: 4+/5                                      ER: Left: 4/5, Right:5/5                          MEBQ: Flex: Left: 4/5, Right: 5/5                                      Ext: Left: 4+/5, Right: 5/5 6/1/22              Current: NA      Patient will be able to safely ambulate community distances without and normal gait mechanics in order to improve overall mobility and return patient to his or her PLOF.   Eval: mildly antalgic, lacking bilateral TKE prior to heel strike (pre-op)  Post-Op Reassess:  With RW: stiff knee gait pattern including impaired heel/toe mechanics, lacking TKE prior to heel strike, decreased knee flexion during push off, and decrease knee flexion during swing, slow gait speed, WBOS     5/6/22  Last PN: Pt able to amb without SPC  With moderate gait speed and demonstrating reciprocal gait pattern with good heel strike bilaterally and slight deviation 6/1/22              Current: progressing 6/10/22 discussed starting walking progrma at 5-10 min and increase as tolerance allows      Patient will be able to safely negotiate a full flight of stairs with a step-through pattern while holding onto at least one handrail in order to return to normal with this functional activity and improve safety on stairs.   Eval: FF step-through pattern (pre-op)  Last PN: step throughout pattern with no UE for ascending and slight momentum compensation when weightbearing through Left LE but, single UE required for descending    6/1/22, in progress              Current: Progressing 6/13/22 added BOSU step ups with good tolerance, challenged with eccentric heel downs      PLAN  []  Upgrade activities as tolerated     [x]  Continue plan of care  []  Update interventions per flow sheet       []  Discharge due to:_  []  Other:_      Sarah Urias PT, DPT 6/13/2022  7:49 AM    Future Appointments   Date Time Provider Quincy Mari   6/15/2022  3:45 PM Reagan Jorge PT [facility]   6/20/2022  7:45 AM Tasha Mixon PT, JOSE [facility]   6/22/2022  8:30 AM Nora Glass PT [facility]   6/24/2022  8:30 AM Ok William [facility]   6/27/2022  7:45 AM Reagan Jorge PT [facility]   6/29/2022  8:30 AM Tasha Mixon PT, DPT [facility]   7/1/2022  8:30 AM Ok William [facility]

## 2022-06-15 ENCOUNTER — HOSPITAL ENCOUNTER (OUTPATIENT)
Dept: PHYSICAL THERAPY | Age: 72
Discharge: HOME | End: 2022-06-15
Payer: MEDICARE

## 2022-06-15 ENCOUNTER — APPOINTMENT (OUTPATIENT)
Dept: PHYSICAL THERAPY | Age: 72
End: 2022-06-15
Payer: MEDICARE

## 2022-06-15 PROCEDURE — 97140 MANUAL THERAPY 1/> REGIONS: CPT

## 2022-06-15 PROCEDURE — 97110 THERAPEUTIC EXERCISES: CPT

## 2022-06-15 PROCEDURE — 97112 NEUROMUSCULAR REEDUCATION: CPT

## 2022-06-15 PROCEDURE — 97016 VASOPNEUMATIC DEVICE THERAPY: CPT

## 2022-06-15 NOTE — PROGRESS NOTES
PT DAILY TREATMENT NOTE    Patient Name: Christoper Duane  Date:6/15/2022  : 1950  [x]  Patient  Verified  Payor: [employer] / Plan: [place] & [place] / Product Type: Medicare /    In time:335  Out time:443  Total Treatment Time (min): 68  Total Timed Codes (min): 68  1:1 Treatment Time (MC/BCBS only): 58   Visit #: 17 of 25    Treatment Dx: Left knee pain [M25.562]    SUBJECTIVE  Pain Level (0-10 scale): 2  Any medication changes, allergies to medications, adverse drug reactions, diagnosis change, or new procedure performed?: [x] No    [] Yes (see summary sheet for update)  Subjective functional status/changes:   [] No changes reported  Reports most pain at night. Also still increased pain with stair descent.     OBJECTIVE    Modalities Rationale:     decrease pain and increase tissue extensibility to improve patient's ability to perform ADL   min [] Estim, type/location:                                      []  att     []  unatt     []  w/US     []  w/ice    []  w/heat    min []  Mechanical Traction: type/lbs                   []  pro   []  sup   []  int   []  cont    []  before manual    []  after manual    min []  Ultrasound, settings/location:      min []  Iontophoresis w/ dexamethasone, location:                                               []  take home patch       []  in clinic    min []  Ice     []  Heat    location/position:    10 min [x]  Vasopneumatic Device, press/temp: Left knee, legs elevated in supine   If using vaso (only need to measure limb vaso being performed on)      pre-treatment girth :38.7cm       post-treatment girth :       measured at (landmark location) :knee, joint line      min []  Other:    [] Skin assessment post-treatment (if applicable):    []  intact    []  redness- no adverse reaction                  []redness - adverse reaction:          30 min Therapeutic Exercise:  [x] See flow sheet :initiated leg press/bilaterally   Rationale: increase ROM, increase strength and improve coordination to improve the patients ability to perform ADL       20 min Neuromuscular Re-education:  [x]  See flow sheet :   Rationale: increase ROM, increase strength, improve coordination and increase proprioception  to improve the patients ability to perform ADL with improved alignment/symmetrical muscle activity    8 min Manual Therapy:  Functional massage /STM posterior/lateral knee   Rationale: decrease pain, increase ROM and increase tissue extensibility to improve the patients ability to perform ADL  The manual therapy interventions were performed at a separate and distinct time from the therapeutic activities interventions. With   [x] TE   [] TA   [] neuro   [] other: Patient Education: [x] Review HEP    [] Progressed/Changed HEP based on:   [] positioning   [] body mechanics   [] transfers   [] heat/ice application    [] other:      Other Objective/Functional Measures:   Left knee pain with ADL 2/10, max pain at night and with stairs/descent 5/10  Left knee ROM 0-125  TTP left lateral knee       Pain Level (0-10 scale) post treatment: 0    ASSESSMENT/Changes in Function: good tolerance to all activities however residual TTP left lateral knee honey distal ITB and lateral tibialis, continues with increased lateral knee pain with eccentric step down on stairs, able to perform eccentric step down on Level 2 step without pain    Patient will continue to benefit from skilled PT services to address ROM deficits, address strength deficits, analyze and address soft tissue restrictions and analyze and cue movement patterns to attain remaining goals.      [x]  See Plan of Care  [x]  See progress note/recertification  []  See Discharge Summary         Progress towards goals / Updated goals:  Short Term Goals:    to be accomplished within 4 weeks:     Patient will report compliance with prescribed HEP(s) at least 1x/day in order to assist in maximizing therapeutic gains, reduce symptoms, and to progress towards overall prior function. Pre-Op: HEP issued and reviewed with patient, patient also educated in edema reduction techniques              Post-Op Reassess: Patient states he's been icing and elevating, but hasn't yet performed all of his HEP exercises.  Patient given another copy of his HEP today and was advised to please perform 3x/day to promote further ROM/strength/function.     5/6/22              Current: Patient reports continued daily compliance with his HEP (3x/day).   5/27/22, met     Patient will demonstrate at least 5-100 deg of left knee AROM post-operatively in order to return to prior functional activities and mobility. Pre-Op AROM: 8-125 deg  Post-Op Reassess:  AROM: 7-87 deg; AAROM: 5-93 deg    5/6/22  Last PN: AROM: 2-120 deg 6/1/22 Met          Long Term Goals:     to be accomplished within 9 weeks:     Patient will score at least 64 points on FOTO in order to maximize function and promote patient satisfaction with overall outcome. ([address] is an established functional score where 100 = no disability)  Pre-Op FOTO: 52              Post-Op Reassess: 31   5/6/22              Last PN: 47     5/27/22, in progress              Current: 6/13/22 Progressing 49     Patient will report less than 2/10 pain during all functional activities and ADLs in order to improve QOL and return to patient's PLOF. Post-Op Reassess: to be completed s/p surgery  Post-Op Reassess:  6-7/10 pain currently, with narcotics      5/6/22              Last PN: 6/10 at worst in the past week 6/1/22              Current: 6/15/22: most pain at night 5/10, 2/10 with ADL/ambulation, ambulation without AD, progressing     Patient will demonstrate functional and painfree AROM of at least 0-120 deg in order to return to prior functional activities and mobility.   Pre-Op AROM: 8-125 deg  Post-Op Reassess:  AROM: 7-87 deg; AAROM: 5-93 deg      5/6/22  Last PN: AROM: 2-120 deg 6/1/22              Current: Progressing 6/15/22 Left Knee AROM: 1-125,     Patient will demonstrate at least 5/5 strength bilaterally in order to be able to safely perform functional activities and demonstrate improved stability and strength.              Pre-Op Strength: Grossly 5/5 bilaterally except bilateral hip flexion 4+/5  Post-Op Reassess:  (+) extensor lag with mild atrophy of left quad, did not formally test other musculature (will at later date once better AROM is achieved)      5/6/22            Status on 6/15/22: Hip: Flex: 5/5 Sohan                                      Abd: 4+/5 Sohan                                      Ext: 4+/5 Sohan                                      IR: Left: 4/5, Right: 4+/5                                      ER: Left: 4/5, Right:5/5                          ILZG: Flex: Left: 4/5, Right: 5/5                                      Ext: Left: 4+/5, Right: 5/5, progressing                    Patient will be able to safely ambulate community distances without and normal gait mechanics in order to improve overall mobility and return patient to his or her PLOF. Eval: mildly antalgic, lacking bilateral TKE prior to heel strike (pre-op)  Post-Op Reassess:  With RW: stiff knee gait pattern including impaired heel/toe mechanics, lacking TKE prior to heel strike, decreased knee flexion during push off, and decrease knee flexion during swing, slow gait speed, WBOS     5/6/22  Last PN: Pt able to amb without SPC  With moderate gait speed and demonstrating reciprocal gait pattern with good heel strike bilaterally and slight deviation 6/1/22              Current: progressing 6/10/22 discussed starting walking progrma at 5-10 min and increase as tolerance allows      Patient will be able to safely negotiate a full flight of stairs with a step-through pattern while holding onto at least one handrail in order to return to normal with this functional activity and improve safety on stairs.   Eval: FF step-through pattern (pre-op)  Last PN: step throughout pattern with no UE for ascending and slight momentum compensation when weightbearing through Left LE but, single UE required for descending    6/1/22, in progress              Current: Progressing 6/15/22 patient demonstrates good tolerance to  BOSU step ups ,  residual left knee pain with stair descent- mostly using altered gait pattern, normal gait pattern ascending stairs, progressing      PLAN  []  Upgrade activities as tolerated     [x]  Continue plan of care  []  Update interventions per flow sheet       []  Discharge due to:_  []  Other:_      Clare Unger, PT 6/15/2022  3:44 PM    Future Appointments   Date Time Provider [place]   6/15/2022  3:45 PM Kenzie Russell PT [facility]   6/20/2022  7:45 AM Stephanie Bhatt, PT, DPT [facility]   6/22/2022  8:30 AM Halima Huddleston PT [facility]   6/24/2022  8:30 AM Cruz William [facility]   6/27/2022  7:45 AM Kenzie Russell PT [facility]   6/29/2022  8:30 AM Stephanie Bhatt PT, DPT [facility]   7/1/2022  8:30 AM Cruz William [facility]

## 2022-06-15 NOTE — PROGRESS NOTES
[facility] at the [address], [place], [address] [facility]  Phone: [phone]      Fax:  [phone]    Patient name: Lavetta Meckel Start of Care:    Referral source: Nina Durant MD : [id number]   Medical/Treatment Diagnosis: Left knee pain [M25.562] Onset Date:2022   Prior Hospitalization: see medical history Provider#: [id number]   Medications: Verified on Patient Summary List     Comorbidities:  s/p right TKR in , cataract surgery, facial cyst removal, prostate issues (no cancer), hypertension, high cholesterol, sleep apnea  Progress Note    Visits from Start of Care: 17    Missed Visits: 0    Progress Towards Goals:   Short Term Goals:    to be accomplished within 4 weeks:     Patient will report compliance with prescribed HEP(s) at least 1x/day in order to assist in maximizing therapeutic gains, reduce symptoms, and to progress towards overall prior function. Pre-Op: HEP issued and reviewed with patient, patient also educated in edema reduction techniques              Post-Op Reassess: Patient states he's been icing and elevating, but hasn't yet performed all of his HEP exercises.  Patient given another copy of his HEP today and was advised to please perform 3x/day to promote further ROM/strength/function.     22              Current: Patient reports continued daily compliance with his HEP (3x/day).   22, met     Patient will demonstrate at least 5-100 deg of left knee AROM post-operatively in order to return to prior functional activities and mobility. Pre-Op AROM: 8-125 deg  Post-Op Reassess:  AROM: 7-87 deg; AAROM: 5-93 deg    22  Last PN: AROM: 2-120 deg 22 Met          Long Term Goals:     to be accomplished within 9 weeks:     Patient will score at least 64 points on FOTO in order to maximize function and promote patient satisfaction with overall outcome.   (FOTO is an established functional score where 100 = no disability)  Pre-Op FOTO: 52              Post-Op Reassess: 31   5/6/22              Last PN: 47     5/27/22, in progress              Current: 6/13/22 Progressing 49     Patient will report less than 2/10 pain during all functional activities and ADLs in order to improve QOL and return to patient's PLOF. Post-Op Reassess: to be completed s/p surgery  Post-Op Reassess:  6-7/10 pain currently, with narcotics      5/6/22              Last PN: 6/10 at worst in the past week 6/1/22              Current: 6/15/22: most pain at night 5/10, 2/10 with ADL/ambulation, ambulation without AD, progressing     Patient will demonstrate functional and painfree AROM of at least 0-120 deg in order to return to prior functional activities and mobility.   Pre-Op AROM: 8-125 deg  Post-Op Reassess:  AROM: 7-87 deg; AAROM: 5-93 deg      5/6/22  Last PN: AROM: 2-120 deg 6/1/22              Current: Progressing 6/15/22 Left Knee AROM: 1-125,     Patient will demonstrate at least 5/5 strength bilaterally in order to be able to safely perform functional activities and demonstrate improved stability and strength.              Pre-Op Strength: Grossly 5/5 bilaterally except bilateral hip flexion 4+/5  Post-Op Reassess:  (+) extensor lag with mild atrophy of left quad, did not formally test other musculature (will at later date once better AROM is achieved)      5/6/22            Status on 6/15/22: Hip: Flex: 5/5 Sohan                                      Abd: 4+/5 Sohan                                      Ext: 4+/5 Sohan                                      IR: Left: 4/5, Right: 4+/5                                      ER: Left: 4/5, Right:5/5                          BVOD: Flex: Left: 4/5, Right: 5/5                                      Ext: Left: 4+/5, Right: 5/5, progressing                    Patient will be able to safely ambulate community distances without and normal gait mechanics in order to improve overall mobility and return patient to his or her PLOF. Eval: mildly antalgic, lacking bilateral TKE prior to heel strike (pre-op)  Post-Op Reassess:  With RW: stiff knee gait pattern including impaired heel/toe mechanics, lacking TKE prior to heel strike, decreased knee flexion during push off, and decrease knee flexion during swing, slow gait speed, WBOS     5/6/22  Last PN: Pt able to amb without SPC  With moderate gait speed and demonstrating reciprocal gait pattern with good heel strike bilaterally and slight deviation 6/1/22              Current: progressing 6/15/22 discussed starting walking program at 5-10 min and increase as tolerance allows, patient is ambulatory without AD demonstrating good gait pattern  , progressing      Patient will be able to safely negotiate a full flight of stairs with a step-through pattern while holding onto at least one handrail in order to return to normal with this functional activity and improve safety on stairs. Eval: FF step-through pattern (pre-op)  Last PN: step throughout pattern with no UE for ascending and slight momentum compensation when weightbearing through Left LE but, single UE required for descending    6/1/22, in progress              Current: Progressing 6/15/22 patient demonstrates good tolerance to  BOSU step ups ,  residual left knee pain with stair descent- mostly using altered gait pattern, normal gait pattern ascending stairs, progressing        Key Functional Changes: normal gait pattern with limited community ambulation  Updated Goals: to be achieved in 8 treatments:   Remaining goals as above  ASSESSMENT/RECOMMENDATIONS:Mr. Karol Archuleta has attended a total of 17 visits s/p left TKA to address deficits in ROM, strength, gait pattern and functional tolerances.  He has made nice gains in left knee ROM and functional strength, is ambulatory without AD and demonstrates a good gait pattern however still reports marked stiffness in AM, lateral knee pain especially at night and when descending stairs. I recommend to continue with current treatment to address remaining goals and deficits.    [x]Continue therapy per initial plan/protocol at a frequency of  2 x per week for 4 weeks    Thank you for this referral.   Garcia Chowdhury, PT 6/15/2022 4:46 PM [Annual] : an annual visit. [Vulvar/Vaginal Complaint] : vulvar/vaginal complaint

## 2022-06-17 ENCOUNTER — APPOINTMENT (OUTPATIENT)
Dept: PHYSICAL THERAPY | Age: 72
End: 2022-06-17
Payer: MEDICARE

## 2022-06-17 ENCOUNTER — HOSPITAL ENCOUNTER (OUTPATIENT)
Dept: PHYSICAL THERAPY | Age: 72
Discharge: HOME | End: 2022-06-17
Payer: MEDICARE

## 2022-06-17 PROCEDURE — 97016 VASOPNEUMATIC DEVICE THERAPY: CPT

## 2022-06-17 PROCEDURE — 97530 THERAPEUTIC ACTIVITIES: CPT

## 2022-06-17 PROCEDURE — 97112 NEUROMUSCULAR REEDUCATION: CPT

## 2022-06-17 PROCEDURE — 97110 THERAPEUTIC EXERCISES: CPT

## 2022-06-17 NOTE — PROGRESS NOTES
PT DAILY TREATMENT NOTE    Patient Name: Glen Bhakta  Date:2022  : 1950  [x]  Patient  Verified  Payor: [place] / Plan: [place] MED[place]ARE PART A & B / Product Type: Medicare /    In time: 2:16  Out time: 3:17  Total Treatment Time (min): 61  Total Timed Codes (min): 10  1:1 Treatment Time (MC/BCBS only): 50   Visit #: 18 of 25    Treatment Dx: Left knee pain [M25.562]    SUBJECTIVE  Pain Level (0-10 scale): 0  Any medication changes, allergies to medications, adverse drug reactions, diagnosis change, or new procedure performed?: [x] No    [] Yes (see summary sheet for update)  Subjective functional status/changes:   [] No changes reported  Reports MD said everything looked fine and to be patient with his progress. OBJECTIVE    Modalities Rationale:     decrease edema, decrease inflammation and decrease pain to improve patient's ability to return to PLOF.    min [] Estim, type/location:                                      []  att     []  unatt     []  w/US     []  w/ice    []  w/heat    min []  Mechanical Traction: type/lbs                   []  pro   []  sup   []  int   []  cont    []  before manual    []  after manual    min []  Ultrasound, settings/location:      min []  Iontophoresis w/ dexamethasone, location:                                               []  take home patch       []  in clinic    min []  Ice     []  Heat    location/position:    10 min [x]  Vasopneumatic Device, press/temp: Med, 34 in supine, LE elevated   If using vaso (only need to measure limb vaso being performed on)      pre-treatment girth : 40.5      post-treatment girth : 40.25      measured at (landmark location) :  Mid patella    min []  Other:    [] Skin assessment post-treatment (if applicable):    []  intact    []  redness- no adverse reaction                  []redness - adverse reaction:        16 min Therapeutic Exercise:  [x] See flow sheet :   Rationale: increase ROM and increase strength to improve the patients ability to return to PLOF. 20 min Therapeutic Activity:  [x]  See flow sheet :   Rationale: increase ROM, increase strength and improve coordination  to improve the patients ability to perform ADLs pain free. 15 min Neuromuscular Re-education:  [x]  See flow sheet :   Rationale: increase ROM, increase strength, improve coordination, improve balance and increase proprioception  to improve the patients ability to improve functional mobility. With   [] TE   [] TA   [] neuro   [] other: Patient Education: [x] Review HEP    [] Progressed/Changed HEP based on:   [] positioning   [] body mechanics   [] transfers   [] heat/ice application    [] other:      Pain Level (0-10 scale) post treatment: 0    ASSESSMENT/Changes in Function: Pt with good tolerance to today's session. Pt reported challenge during performance of eccentric heel lower. Pt with good tolerance to addition of 4\" step downs in order to improve stair descent. Pt is making good progress towards their goals. Pt will continue to benefit from skilled therapeutic intervention at this time to address th remaining deficits as discussed in goals below. Patient will continue to benefit from skilled PT services to modify and progress therapeutic interventions, address functional mobility deficits, address ROM deficits, address strength deficits, analyze and address soft tissue restrictions, analyze and cue movement patterns, analyze and modify body mechanics/ergonomics, assess and modify postural abnormalities and address imbalance/dizziness to attain remaining goals.      [x]  See Plan of Care  []  See progress note/recertification  []  See Discharge Summary         Progress towards goals / Updated goals:  Short Term Goals:    to be accomplished within 4 weeks:     Patient will report compliance with prescribed HEP(s) at least 1x/day in order to assist in maximizing therapeutic gains, reduce symptoms, and to progress towards overall prior function. Pre-Op: HEP issued and reviewed with patient, patient also educated in edema reduction techniques              Post-Op Reassess: Patient states he's been icing and elevating, but hasn't yet performed all of his HEP exercises.  Patient given another copy of his HEP today and was advised to please perform 3x/day to promote further ROM/strength/function.     5/6/22              Current: Patient reports continued daily compliance with his HEP (3x/day).   5/27/22, met     Patient will demonstrate at least 5-100 deg of left knee AROM post-operatively in order to return to prior functional activities and mobility. Pre-Op AROM: 8-125 deg  Post-Op Reassess:  AROM: 7-87 deg; AAROM: 5-93 deg    5/6/22  Last PN: AROM: 2-120 deg 6/1/22 Met          Long Term Goals:     to be accomplished within 9 weeks:     Patient will score at least 64 points on FOTO in order to maximize function and promote patient satisfaction with overall outcome. (Archana Gan is an established functional score where 100 = no disability)  Pre-Op FOTO: 52              Post-Op Reassess: 31   5/6/22              Last PN: 47     5/27/22, in progress              Current: 6/13/22 Progressing 49     Patient will report less than 2/10 pain during all functional activities and ADLs in order to improve QOL and return to patient's PLOF. Post-Op Reassess: to be completed s/p surgery  Post-Op Reassess:  6-7/10 pain currently, with narcotics      5/6/22              Last PN: 6/10 at worst in the past week 6/1/22              Current: 6/15/22: most pain at night 5/10, 2/10 with ADL/ambulation, ambulation without AD, progressing     Patient will demonstrate functional and painfree AROM of at least 0-120 deg in order to return to prior functional activities and mobility.   Pre-Op AROM: 8-125 deg  Post-Op Reassess:  AROM: 7-87 deg; AAROM: 5-93 deg      5/6/22  Last PN: AROM: 2-120 deg 6/1/22              Current: Progressing 6/15/22 Left Knee AROM: 1-125,     Patient will demonstrate at least 5/5 strength bilaterally in order to be able to safely perform functional activities and demonstrate improved stability and strength.              Pre-Op Strength: Grossly 5/5 bilaterally except bilateral hip flexion 4+/5  Post-Op Reassess:  (+) extensor lag with mild atrophy of left quad, did not formally test other musculature (will at later date once better AROM is achieved)      5/6/22            Status on 6/15/22: Hip: Flex: 5/5 Sohan                                      Abd: 4+/5 Sohan                                      Ext: 4+/5 Sohan                                      IR: Left: 4/5, Right: 4+/5                                      ER: Left: 4/5, Right:5/5                          DBGO: Flex: Left: 4/5, Right: 5/5                                      Ext: Left: 4+/5, Right: 5/5, progressing                    Patient will be able to safely ambulate community distances without and normal gait mechanics in order to improve overall mobility and return patient to his or her PLOF. Eval: mildly antalgic, lacking bilateral TKE prior to heel strike (pre-op)  Post-Op Reassess:  With RW: stiff knee gait pattern including impaired heel/toe mechanics, lacking TKE prior to heel strike, decreased knee flexion during push off, and decrease knee flexion during swing, slow gait speed, WBOS     5/6/22  Last PN: Pt able to amb without SPC  With moderate gait speed and demonstrating reciprocal gait pattern with good heel strike bilaterally and slight deviation 6/1/22              Current: progressing 6/15/22 discussed starting walking program at 5-10 min and increase as tolerance allows, patient is ambulatory without AD demonstrating good gait pattern  , progressing      Patient will be able to safely negotiate a full flight of stairs with a step-through pattern while holding onto at least one handrail in order to return to normal with this functional activity and improve safety on stairs.   Eval: FF step-through pattern (pre-op)  Last PN: step throughout pattern with no UE for ascending and slight momentum compensation when weightbearing through Left LE but, single UE required for descending    6/1/22, in progress              Current: Progressing 6/15/22 patient demonstrates good tolerance to  BOSU step ups ,  residual left knee pain with stair descent- mostly using altered gait pattern, normal gait pattern ascending stairs, progressing      PLAN  []  Upgrade activities as tolerated     [x]  Continue plan of care  []  Update interventions per flow sheet       []  Discharge due to:_  []  Other:_      Jeanna Swenson PT 6/17/2022  8:32 AM    Future Appointments   Date Time Provider Quincy Jacey   6/17/2022  2:15 PM Gabriella Tineo PT [facility]   6/20/2022  7:45 AM Ginny Alatorre PT, DPT [facility]   6/22/2022  8:30 AM Gabriella Tineo PT [facility]   6/24/2022  8:30 AM Patrick William [facility]   6/27/2022  7:45 AM Shiva Ballard PT [facility]   6/29/2022  8:30 AM Ginny Alatorre PT, DPT [facility]   7/1/2022  8:30 AM Patrick William [facility]

## 2022-06-20 ENCOUNTER — HOSPITAL ENCOUNTER (OUTPATIENT)
Dept: PHYSICAL THERAPY | Age: 72
Discharge: HOME | End: 2022-06-20
Payer: MEDICARE

## 2022-06-20 ENCOUNTER — APPOINTMENT (OUTPATIENT)
Dept: PHYSICAL THERAPY | Age: 72
End: 2022-06-20
Payer: MEDICARE

## 2022-06-20 PROCEDURE — 97016 VASOPNEUMATIC DEVICE THERAPY: CPT

## 2022-06-20 PROCEDURE — 97110 THERAPEUTIC EXERCISES: CPT

## 2022-06-20 PROCEDURE — 97112 NEUROMUSCULAR REEDUCATION: CPT

## 2022-06-20 PROCEDURE — 97530 THERAPEUTIC ACTIVITIES: CPT

## 2022-06-20 NOTE — PROGRESS NOTES
PT DAILY TREATMENT NOTE    Patient Name: Alicia Guevara  Date:2022  : 1950  [x]  Patient  Verified  Payor: Lolita Villavicencio / Plan: [place] MEDICARE PART A & B / Product Type: Medicare /    In time:7:44 am  Out time:8:54 am   Total Treatment Time (min): 70  Total Timed Codes (min): 60  1:1 Treatment Time (MC/BCBS only): 50   Visit #: 19 of 25    Treatment Dx: Left knee pain [M25.562]    SUBJECTIVE  Pain Level (0-10 scale): 0  Any medication changes, allergies to medications, adverse drug reactions, diagnosis change, or new procedure performed?: [x] No    [] Yes (see summary sheet for update)  Subjective functional status/changes:   [] No changes reported  \"Just stiff. \"  Reported most difficulty with descending stairs. Medial ankle/post tib pain is subsiding but lateral knee pain is remaining    OBJECTIVE    Modalities Rationale:     decrease inflammation and decrease pain to improve patient's ability to perform daily activities with decreased pain and symptom levels     min [] Estim, type/location:                                      []  att     []  unatt     []  w/US     []  w/ice    []  w/heat    min []  Mechanical Traction: type/lbs                   []  pro   []  sup   []  int   []  cont    []  before manual    []  after manual    min []  Ultrasound, settings/location:      min []  Iontophoresis w/ dexamethasone, location:                                               []  take home patch       []  in clinic    min []  Ice     []  Heat    location/position:    10 min [x]  Vasopneumatic Device, press/temp:  To left knee in supine with LE elevated     If using vaso (only need to measure limb vaso being performed on)      pre-treatment girth : 40.5 cm      post-treatment girth : 40.5 cm      measured at (landmark location) :  Mid patella     min []  Other:    [x] Skin assessment post-treatment (if applicable):    [x]  intact    []  redness- no adverse reaction                  []redness - adverse reaction: 15 min Therapeutic Exercise:  [x] See flow sheet :   Rationale: increase ROM, increase strength, improve coordination and increase proprioception to improve the patients ability to perform daily activities with decreased pain and symptom levels      15 min Therapeutic Activity:  [x]  See flow sheet :   Rationale: increase ROM, increase strength, improve coordination, improve balance and increase proprioception  to improve the patients ability to perform daily activities with decreased pain and symptom levels       25 min Neuromuscular Re-education:  [x]  See flow sheet :   Rationale: increase ROM, increase strength, improve coordination, improve balance and increase proprioception  to improve the patients ability to perform daily activities with decreased pain and symptom levels      5- NC min Manual Therapy:  Manual stretching of left posterior hip capsule in right S/L   Rationale: decrease pain, increase ROM, increase tissue extensibility, decrease trigger points and increase postural awareness to improve the patients ability to perform daily activities with decreased pain and symptom levels    The manual therapy interventions were performed at a separate and distinct time from the therapeutic activities interventions. With   [] TE   [] TA   [] neuro   [] other: Patient Education: [x] Review HEP    [] Progressed/Changed HEP based on:   [] positioning   [] body mechanics   [] transfers   [] heat/ice application    [] other:      Other Objective/Functional Measures:   Attempted bridging - had increase in left lateral knee knee pain with bridging - with palpation noted loss of left hamstring facilitation with lowering     Pain Level (0-10 scale) post treatment: 0    ASSESSMENT/Changes in Function:   Pt responded very well to left posterior hip capsule inhibition and with eccentric heel downs at wall. Reported primary complaint is of pain with descending stairs.  Responded well to cues with right reach with sit to stand. Patient will continue to benefit from skilled PT services to modify and progress therapeutic interventions, address functional mobility deficits, address ROM deficits, address strength deficits, analyze and address soft tissue restrictions, analyze and cue movement patterns, analyze and modify body mechanics/ergonomics, assess and modify postural abnormalities, address imbalance/dizziness and instruct in home and community integration to attain remaining goals. [x]  See Plan of Care  []  See progress note/recertification  []  See Discharge Summary         Progress towards goals / Updated goals:  Short Term Goals:    to be accomplished within 4 weeks:     Patient will report compliance with prescribed HEP(s) at least 1x/day in order to assist in maximizing therapeutic gains, reduce symptoms, and to progress towards overall prior function. Pre-Op: HEP issued and reviewed with patient, patient also educated in edema reduction techniques              Post-Op Reassess: Patient states he's been icing and elevating, but hasn't yet performed all of his HEP exercises.  Patient given another copy of his HEP today and was advised to please perform 3x/day to promote further ROM/strength/function.     5/6/22              Last PNt: Patient reports continued daily compliance with his HEP (3x/day).   5/27/22, met     Patient will demonstrate at least 5-100 deg of left knee AROM post-operatively in order to return to prior functional activities and mobility. Pre-Op AROM: 8-125 deg  Post-Op Reassess:  AROM: 7-87 deg; AAROM: 5-93 deg    5/6/22  Last PN: AROM: 2-120 deg 6/1/22 Met          Long Term Goals:     to be accomplished within 9 weeks:     Patient will score at least 64 points on FOTO in order to maximize function and promote patient satisfaction with overall outcome.   (Oral Rohan is an established functional score where 100 = no disability)  Pre-Op FOTO: 52              Post-Op Reassess: 31   5/6/22              Last PN: 47     5/27/22, in progress              Current: 6/13/22 Progressing 49     Patient will report less than 2/10 pain during all functional activities and ADLs in order to improve QOL and return to patient's PLOF. Post-Op Reassess: to be completed s/p surgery  Post-Op Reassess:  6-7/10 pain currently, with narcotics      5/6/22              Last PN: 6/15/22: most pain at night 5/10, 2/10 with ADL/ambulation, ambulation without AD, progressing     Patient will demonstrate functional and painfree AROM of at least 0-120 deg in order to return to prior functional activities and mobility. Pre-Op AROM: 8-125 deg  Post-Op Reassess:  AROM: 7-87 deg; AAROM: 5-93 deg      5/6/22   Last PN: : Progressing 6/15/22 Left Knee AROM: 1-125,     Patient will demonstrate at least 5/5 strength bilaterally in order to be able to safely perform functional activities and demonstrate improved stability and strength.              Pre-Op Strength: Grossly 5/5 bilaterally except bilateral hip flexion 4+/5  Post-Op Reassess:  (+) extensor lag with mild atrophy of left quad, did not formally test other musculature (will at later date once better AROM is achieved)      5/6/22             Last PN: Status on 6/15/22: Hip: Flex: 5/5 Soahn                                      Abd: 4+/5 Sohan                                      Ext: 4+/5 Sohan                                      IR: Left: 4/5, Right: 4+/5                                      ER: Left: 4/5, Right:5/5                          XHLY: Flex: Left: 4/5, Right: 5/5                                     Ext: Left: 4+/5, Right: 5/5, progressing             Current: Progressing 6/20/22 as per tolerance to exercises        Patient will be able to safely ambulate community distances without and normal gait mechanics in order to improve overall mobility and return patient to his or her PLOF.   Eval: mildly antalgic, lacking bilateral TKE prior to heel strike (pre-op)  Post-Op Reassess:  With RW: stiff knee gait pattern including impaired heel/toe mechanics, lacking TKE prior to heel strike, decreased knee flexion during push off, and decrease knee flexion during swing, slow gait speed, WBOS     5/6/22  Last PN:  progressing 6/15/22 discussed starting walking program at 5-10 min and increase as tolerance allows, patient is ambulatory without AD demonstrating good gait pattern  , progressing      Patient will be able to safely negotiate a full flight of stairs with a step-through pattern while holding onto at least one handrail in order to return to normal with this functional activity and improve safety on stairs. Eval: FF step-through pattern (pre-op)  Last PN:  Progressing 6/15/22 patient demonstrates good tolerance to  BOSU step ups ,  residual left knee pain with stair descent- mostly using altered gait pattern, normal gait pattern ascending stairs, progressing    Current: progressing 6/20/22 with tolerance to eccentric lowering with max cues from 4 in - per report most challenged at home with descending stairs.      PLAN  []  Upgrade activities as tolerated     [x]  Continue plan of care  []  Update interventions per flow sheet       []  Discharge due to:_  []  Other:_      Sarah Urias PT, DPT 6/20/2022  7:58 AM    Future Appointments   Date Time Provider Quincy Mari   6/22/2022  8:30 AM Nora Glass PT [facility]   6/24/2022  8:30 AM Ok William [facility]   6/27/2022  7:45 AM Reagan Jorge PT [facility]   6/29/2022  8:30 AM Tasha Mixon PT, DPDANITZA [facility]   7/1/2022  8:30 AM Ok William [facility]

## 2022-06-22 ENCOUNTER — HOSPITAL ENCOUNTER (OUTPATIENT)
Dept: PHYSICAL THERAPY | Age: 72
Discharge: HOME | End: 2022-06-22
Payer: MEDICARE

## 2022-06-22 ENCOUNTER — APPOINTMENT (OUTPATIENT)
Dept: PHYSICAL THERAPY | Age: 72
End: 2022-06-22
Payer: MEDICARE

## 2022-06-22 PROCEDURE — 97140 MANUAL THERAPY 1/> REGIONS: CPT

## 2022-06-22 PROCEDURE — 97016 VASOPNEUMATIC DEVICE THERAPY: CPT

## 2022-06-22 PROCEDURE — 97530 THERAPEUTIC ACTIVITIES: CPT

## 2022-06-22 PROCEDURE — 97110 THERAPEUTIC EXERCISES: CPT

## 2022-06-22 NOTE — PROGRESS NOTES
PT DAILY TREATMENT NOTE    Patient Name: Lavetta Meckel  Date:2022  : 1950  [x]  Patient  Verified  Payor: [place] MED[place]ARE / Plan: [place] MEDICARE PART A & B / Product Type: Medicare /    In time:8:30  Out time:9:34  Total Treatment Time (min): 64  Total Timed Codes (min): 54  1:1 Treatment Time (MC/BCBS only): 59   Visit #: 20 of 25    Treatment Dx: Left knee pain [M25.562]    SUBJECTIVE  Pain Level (0-10 scale): 0  Any medication changes, allergies to medications, adverse drug reactions, diagnosis change, or new procedure performed?: [x] No    [] Yes (see summary sheet for update)  Subjective functional status/changes:   [] No changes reported  First day of work went well, but still having a lot of swelling in knee.      OBJECTIVE    Modalities Rationale:     decrease edema and decrease inflammation to improve patient's ability to reduce stiffness and improve ROM   min [] Estim, type/location:                                      []  att     []  unatt     []  w/US     []  w/ice    []  w/heat    min []  Mechanical Traction: type/lbs                   []  pro   []  sup   []  int   []  cont    []  before manual    []  after manual    min []  Ultrasound, settings/location:      min []  Iontophoresis w/ dexamethasone, location:                                               []  take home patch       []  in clinic    min []  Ice     []  Heat    location/position:    10 min [x]  Vasopneumatic Device, press/temp: 34, med   If using vaso (only need to measure limb vaso being performed on)      pre-treatment girth : 41 cm      post-treatment girth : 40.25 cm    measured at (landmark location) :  Mid patella    min []  Other:    [] Skin assessment post-treatment (if applicable):    []  intact    []  redness- no adverse reaction                  []redness - adverse reaction:         20 min Therapeutic Exercise:  [x] See flow sheet :   Rationale: increase ROM and increase strength to improve the patients ability to return to PLOF. 18 min Therapeutic Activity:  [x]  See flow sheet :   Rationale: increase ROM, increase strength and improve coordination  to improve the patients ability to perform ADLs     6 NC min Neuromuscular Re-education:  [x]  See flow sheet :   Rationale: increase ROM, increase strength, improve coordination, improve balance and increase proprioception  to improve the patients ability to reduce ambulation compensation and improve stair negotiation. 10 min Manual Therapy:  Scar massage, STM of lateral/medial knee, effleurage massage. Rationale: decrease pain, increase ROM, increase tissue extensibility, decrease edema  and decrease trigger points to improve the patients ability to return to PLOF. The manual therapy interventions were performed at a separate and distinct time from the therapeutic activities interventions. With   [x] TE   [x] TA   [] neuro   [] other: Patient Education: [x] Review HEP    [] Progressed/Changed HEP based on:   [] positioning   [] body mechanics   [] transfers   [] heat/ice application    [] other:        Pain Level (0-10 scale) post treatment: 0    ASSESSMENT/Changes in Function: Pt with good tolerance to today's session. Pt reported challenge during performance of lunge slide outs, quickly fatiguing and requiring cues to perform properlly. Pt with good tolerance to addition of sled push in order to improve dynamic gait and power. Pt is making good progress towards their goals. Pt will continue to benefit from skilled therapeutic intervention at this time to address th remaining deficits as discussed in goals below.     Patient will continue to benefit from skilled PT services to modify and progress therapeutic interventions, address functional mobility deficits, address ROM deficits, address strength deficits, analyze and address soft tissue restrictions, analyze and cue movement patterns, analyze and modify body mechanics/ergonomics, assess and modify postural abnormalities and address imbalance/dizziness to attain remaining goals. [x]  See Plan of Care  []  See progress note/recertification  []  See Discharge Summary         Progress towards goals / Updated goals:  Short Term Goals:    to be accomplished within 4 weeks:     Patient will report compliance with prescribed HEP(s) at least 1x/day in order to assist in maximizing therapeutic gains, reduce symptoms, and to progress towards overall prior function. Pre-Op: HEP issued and reviewed with patient, patient also educated in edema reduction techniques              Post-Op Reassess: Patient states he's been icing and elevating, but hasn't yet performed all of his HEP exercises.  Patient given another copy of his HEP today and was advised to please perform 3x/day to promote further ROM/strength/function.     5/6/22              Last PNt: Patient reports continued daily compliance with his HEP (3x/day).   5/27/22, met     Patient will demonstrate at least 5-100 deg of left knee AROM post-operatively in order to return to prior functional activities and mobility. Pre-Op AROM: 8-125 deg  Post-Op Reassess:  AROM: 7-87 deg; AAROM: 5-93 deg    5/6/22  Last PN: AROM: 2-120 deg 6/1/22 Met          Long Term Goals:     to be accomplished within 9 weeks:     Patient will score at least 64 points on FOTO in order to maximize function and promote patient satisfaction with overall outcome. (Longo Basil is an established functional score where 100 = no disability)  Pre-Op FOTO: 52              Post-Op Reassess: 31   5/6/22              Last PN: 47     5/27/22, in progress              Current: 6/22/22 47 - slight regression (possibly due to returning to work)     Patient will report less than 2/10 pain during all functional activities and ADLs in order to improve QOL and return to patient's PLOF.   Post-Op Reassess: to be completed s/p surgery  Post-Op Reassess:  6-7/10 pain currently, with narcotics      5/6/22              Last PN: 6/15/22: most pain at night 5/10, 2/10 with ADL/ambulation, ambulation without AD, progressing   Current 6/22/22: pain is 5/10 at worst, sometimes at night after a long day (takes alieve) progressing    Patient will demonstrate functional and painfree AROM of at least 0-120 deg in order to return to prior functional activities and mobility. Pre-Op AROM: 8-125 deg  Post-Op Reassess:  AROM: 7-87 deg; AAROM: 5-93 deg      5/6/22              Last PN: : Progressing 6/15/22 Left Knee AROM: 1-125,     Patient will demonstrate at least 5/5 strength bilaterally in order to be able to safely perform functional activities and demonstrate improved stability and strength.              Pre-Op Strength: Grossly 5/5 bilaterally except bilateral hip flexion 4+/5  Post-Op Reassess:  (+) extensor lag with mild atrophy of left quad, did not formally test other musculature (will at later date once better AROM is achieved)      5/6/22              Last PN: Status on 6/15/22: Hip: Flex: 5/5 Sohan                                      Abd: 4+/5 Sohan                                      Ext: 4+/5 Sohan                                      IR: Left: 4/5, Right: 4+/5                                      ER: Left: 4/5, Right:5/5                          EWPG: Flex: Left: 4/5, Right: 5/5                                     Ext: Left: 4+/5, Right: 5/5, progressing             Current: Progressing 6/20/22 as per tolerance to exercises        Patient will be able to safely ambulate community distances without and normal gait mechanics in order to improve overall mobility and return patient to his or her PLOF.   Eval: mildly antalgic, lacking bilateral TKE prior to heel strike (pre-op)  Post-Op Reassess:  With RW: stiff knee gait pattern including impaired heel/toe mechanics, lacking TKE prior to heel strike, decreased knee flexion during push off, and decrease knee flexion during swing, slow gait speed, WBOS     5/6/22  Last PN:  progressing 6/15/22 discussed starting walking program at 5-10 min and increase as tolerance allows, patient is ambulatory without AD demonstrating good gait pattern  , progressing      Patient will be able to safely negotiate a full flight of stairs with a step-through pattern while holding onto at least one handrail in order to return to normal with this functional activity and improve safety on stairs. Eval: FF step-through pattern (pre-op)  Last PN:  Progressing 6/15/22 patient demonstrates good tolerance to  BOSU step ups ,  residual left knee pain with stair descent- mostly using altered gait pattern, normal gait pattern ascending stairs, progressing    Current: progressing 6/20/22 with tolerance to eccentric lowering with max cues from 4 in - per report most challenged at home with descending stairs.     PLAN  []  Upgrade activities as tolerated     [x]  Continue plan of care  []  Update interventions per flow sheet       []  Discharge due to:_  []  Other:_      Sara Juarez, PT 6/22/2022  8:20 AM    Future Appointments   Date Time Provider [facility] [facility]   6/22/2022  8:30 AM [facility]   6/24/2022  8:30 AM Leticia Thornton, [facility]   6/27/2022  7:45 AM Leticia Thornton, PT [facility]   6/29/2022  8:30 AM Lisseth Lopez, PT, DPT [facility]   7/1/2022  8:30 AM Katerin William [facility]

## 2022-06-24 ENCOUNTER — APPOINTMENT (OUTPATIENT)
Dept: PHYSICAL THERAPY | Age: 72
End: 2022-06-24
Payer: MEDICARE

## 2022-06-24 ENCOUNTER — HOSPITAL ENCOUNTER (OUTPATIENT)
Dept: PHYSICAL THERAPY | Age: 72
Discharge: HOME | End: 2022-06-24
Payer: MEDICARE

## 2022-06-24 PROCEDURE — 97016 VASOPNEUMATIC DEVICE THERAPY: CPT

## 2022-06-24 PROCEDURE — 97112 NEUROMUSCULAR REEDUCATION: CPT

## 2022-06-24 PROCEDURE — 97110 THERAPEUTIC EXERCISES: CPT

## 2022-06-24 NOTE — PROGRESS NOTES
PT DAILY TREATMENT NOTE    Patient Name: Annabella Chase  Date:2022  : 1950  [x]  Patient  Verified  Payor: [place] MEDICARE / Plan: [place] MEDICARE PART A & B / Product Type: Medicare /    In time:830  Out time:928  Total Treatment Time (min): 58  Total Timed Codes (min): 58  1:1 Treatment Time (MC/BCBS only): 48   Visit #: 21 of 26    Treatment Dx: Left knee pain [M25.562]    SUBJECTIVE  Pain Level (0-10 scale): 0  Any medication changes, allergies to medications, adverse drug reactions, diagnosis change, or new procedure performed?: [x] No    [] Yes (see summary sheet for update)  Subjective functional status/changes:   [] No changes reported  Knee still stiff in AM  Started wearing compression socks all day, feeling better    OBJECTIVE    Modalities Rationale:     decrease pain and increase tissue extensibility to improve patient's ability to perform ADL   min [] Estim, type/location:                                      []  att     []  unatt     []  w/US     []  w/ice    []  w/heat    min []  Mechanical Traction: type/lbs                   []  pro   []  sup   []  int   []  cont    []  before manual    []  after manual    min []  Ultrasound, settings/location:      min []  Iontophoresis w/ dexamethasone, location:                                               []  take home patch       []  in clinic    min []  Ice     []  Heat    location/position:    10 min [x]  Vasopneumatic Device, press/temp: Left knee, legs elevated   If using vaso (only need to measure limb vaso being performed on)      pre-treatment girth : 38cm      post-treatment girth :35cm       measured at (landmark location) : left knee, joint line     min []  Other:    [] Skin assessment post-treatment (if applicable):    []  intact    []  redness- no adverse reaction                  []redness - adverse reaction:          30 min Therapeutic Exercise:  [x] See flow sheet : added bench plank with focus on TKE   Rationale: increase ROM, increase strength and improve coordination to improve the patients ability to perform ADL       18 min Neuromuscular Re-education:  [x]  See flow sheet :   Rationale: improve coordination and increase proprioception  to improve the patients ability to perform ADL            With   [x] TE   [] TA   [] neuro   [] other: Patient Education: [x] Review HEP    [] Progressed/Changed HEP based on:   [] positioning   [] body mechanics   [] transfers   [] heat/ice application    [] other:      Other Objective/Functional Measures:   Performed fwd step down with L2 step without pain  TKE left -4 deg     Pain Level (0-10 scale) post treatment: 0    ASSESSMENT/Changes in Function: residual posterior knee tightness, improved ability to perform step down    Patient will continue to benefit from skilled PT services to address ROM deficits, address strength deficits, analyze and address soft tissue restrictions, analyze and cue movement patterns and assess and modify postural abnormalities to attain remaining goals. [x]  See Plan of Care  []  See progress note/recertification  []  See Discharge Summary         Progress towards goals / Updated goals:  Short Term Goals:    to be accomplished within 4 weeks:     Patient will report compliance with prescribed HEP(s) at least 1x/day in order to assist in maximizing therapeutic gains, reduce symptoms, and to progress towards overall prior function.   Pre-Op: HEP issued and reviewed with patient, patient also educated in edema reduction techniques              Post-Op Reassess: Patient states he's been icing and elevating, but hasn't yet performed all of his HEP exercises.  Patient given another copy of his HEP today and was advised to please perform 3x/day to promote further ROM/strength/function.     5/6/22              Last PNt: Patient reports continued daily compliance with his HEP (3x/day).   5/27/22, met     Patient will demonstrate at least 5-100 deg of left knee AROM post-operatively in order to return to prior functional activities and mobility. Pre-Op AROM: 8-125 deg  Post-Op Reassess:  AROM: 7-87 deg; AAROM: 5-93 deg    5/6/22  Last PN: AROM: 2-120 deg 6/1/22 Met          Long Term Goals:     to be accomplished within 9 weeks:     Patient will score at least 64 points on FOTO in order to maximize function and promote patient satisfaction with overall outcome. ([place] Rupert is an established functional score where 100 = no disability)  Pre-Op FOTO: 52              Post-Op Reassess: 31   5/6/22              Last PN: 47     5/27/22, in progress              Current: 6/22/22 47 - slight regression (possibly due to returning to work)     Patient will report less than 2/10 pain during all functional activities and ADLs in order to improve QOL and return to patient's PLOF. Post-Op Reassess: to be completed s/p surgery  Post-Op Reassess:  6-7/10 pain currently, with narcotics      5/6/22              Last PN: 6/15/22: most pain at night 5/10, 2/10 with ADL/ambulation, ambulation without AD, progressing              Current 6/22/22: pain is 5/10 at worst, sometimes at night after a long day (takes alieve) progressing     Patient will demonstrate functional and painfree AROM of at least 0-120 deg in order to return to prior functional activities and mobility.   Pre-Op AROM: 8-125 deg  Post-Op Reassess:  AROM: 7-87 deg; AAROM: 5-93 deg      5/6/22              Last PN: : Progressing 6/15/22 Left Knee AROM: 1-125,     Patient will demonstrate at least 5/5 strength bilaterally in order to be able to safely perform functional activities and demonstrate improved stability and strength.              Pre-Op Strength: Grossly 5/5 bilaterally except bilateral hip flexion 4+/5  Post-Op Reassess:  (+) extensor lag with mild atrophy of left quad, did not formally test other musculature (will at later date once better AROM is achieved)      5/6/22              Last PN: Status on 6/15/22: Hip: Flex: 5/5 Sohan                                      DMY: 4+/5 Sohan                                      Ext: 4+/5 Sohan                                      IR: Left: 4/5, Right: 4+/5                                      ER: Left: 4/5, Right:5/5                          BXKT: Flex: Left: 4/5, Right: 5/5                                     Ext: Left: 4+/5, Right: 5/5, progressing             Current: Progressing 6/20/22 as per tolerance to exercises        Patient will be able to safely ambulate community distances without and normal gait mechanics in order to improve overall mobility and return patient to his or her PLOF. Eval: mildly antalgic, lacking bilateral TKE prior to heel strike (pre-op)  Post-Op Reassess:  With RW: stiff knee gait pattern including impaired heel/toe mechanics, lacking TKE prior to heel strike, decreased knee flexion during push off, and decrease knee flexion during swing, slow gait speed, WBOS     5/6/22  Last PN:  progressing 6/15/22 discussed starting walking program at 5-10 min and increase as tolerance allows, patient is ambulatory without AD demonstrating good gait pattern  , progressing  Current status 6/24/22: started regular walking and also doing frequent walking at work visiting [employer], able to walk > 10 min without difficulty, progressing      Patient will be able to safely negotiate a full flight of stairs with a step-through pattern while holding onto at least one handrail in order to return to normal with this functional activity and improve safety on stairs.   Eval: FF step-through pattern (pre-op)  Last PN:  Progressing 6/15/22 patient demonstrates good tolerance to  BOSU step ups ,  residual left knee pain with stair descent- mostly using altered gait pattern, normal gait pattern ascending stairs, progressing    Current: progressing 6/20/22 with tolerance to eccentric lowering with max cues from 4 in - per report most challenged at home with descending stairs.       PLAN  [] Upgrade activities as tolerated     [x]  Continue plan of care  []  Update interventions per flow sheet       []  Discharge due to:_  []  Other:_      Cris Doyle PT 6/24/2022  9:20 AM    Future Appointments   Date Time Provider [facility]   6/27/2022  7:45 AM Marianne Milian, PT [facility]   6/29/2022  8:30 AM Esmer Cisneros PT, DPT [facility]   7/1/2022  8:30 AM Omari William [facility]

## 2022-06-27 ENCOUNTER — HOSPITAL ENCOUNTER (OUTPATIENT)
Dept: PHYSICAL THERAPY | Age: 72
Discharge: HOME | End: 2022-06-27
Payer: MEDICARE

## 2022-06-27 ENCOUNTER — APPOINTMENT (OUTPATIENT)
Dept: PHYSICAL THERAPY | Age: 72
End: 2022-06-27
Payer: MEDICARE

## 2022-06-27 PROCEDURE — 97110 THERAPEUTIC EXERCISES: CPT

## 2022-06-27 PROCEDURE — 97112 NEUROMUSCULAR REEDUCATION: CPT

## 2022-06-27 PROCEDURE — 97140 MANUAL THERAPY 1/> REGIONS: CPT

## 2022-06-27 PROCEDURE — 97016 VASOPNEUMATIC DEVICE THERAPY: CPT

## 2022-06-27 NOTE — PROGRESS NOTES
PT DAILY TREATMENT NOTE    Patient Name: Annelise Guthrie  Date:2022  : 1950  [x]  Patient  Verified  Payor: [place] MED[place] / Plan: [place] MEDICARE PART A & B / Product Type: Medicare /    In time:745  Out time:840  Total Treatment Time (min): 55  Total Timed Codes (min): 55  1:1 Treatment Time (MC/BCBS only): 39   Visit #: 22 of 26    Treatment Dx: Left knee pain [M25.562]    SUBJECTIVE  Pain Level (0-10 scale): 0  Any medication changes, allergies to medications, adverse drug reactions, diagnosis change, or new procedure performed?: [x] No    [] Yes (see summary sheet for update)  Subjective functional status/changes:   [] No changes reported  Patient reports no pain currently but still having morning stiffness and pain when descending stairs    OBJECTIVE    Modalities Rationale:     decrease inflammation, decrease pain and increase tissue extensibility to improve patient's ability to perform ADL   min [] Estim, type/location:                                      []  att     []  unatt     []  w/US     []  w/ice    []  w/heat    min []  Mechanical Traction: type/lbs                   []  pro   []  sup   []  int   []  cont    []  before manual    []  after manual    min []  Ultrasound, settings/location:      min []  Iontophoresis w/ dexamethasone, location:                                               []  take home patch       []  in clinic    min []  Ice     []  Heat    location/position:    10 min [x]  Vasopneumatic Device, press/temp: Left knee, supine   If using vaso (only need to measure limb vaso being performed on)      pre-treatment girth : 38.5cm      post-treatment girth : 38.2cm      measured at (landmark location) :left knee, joint line      min []  Other:    [] Skin assessment post-treatment (if applicable):    []  intact    []  redness- no adverse reaction                  []redness - adverse reaction:            20 min Therapeutic Exercise:  [x] See flow sheet :   Rationale: increase ROM, increase strength and improve coordination to improve the patients ability to perform ADL       15 min Neuromuscular Re-education:  []  See flow sheet :   Rationale: increase ROM, increase strength, improve coordination and increase proprioception  to improve the patients ability to perform ADL with improved alignment    10 min Manual Therapy:  STM/functional massage anterior left knee, performed cupping ant/lat knee with active ROM   Rationale: decrease pain, increase ROM and increase tissue extensibility to improve the patients ability to perform ADL  The manual therapy interventions were performed at a separate and distinct time from the therapeutic activities interventions. With   [x] TE   [] TA   [] neuro   [] other: Patient Education: [x] Review HEP    [] Progressed/Changed HEP based on:   [] positioning   [] body mechanics   [] transfers   [] heat/ice application    [] other:      Other Objective/Functional Measures:   LTR both 18 in  Left knee Flex 125  Challenged with S/L left hip ADD       Pain Level (0-10 scale) post treatment: 0    ASSESSMENT/Changes in Function: increased ease with eccentric control left knee post MT, overall progressing well, performs activities without increase in pain,   LTR improved to 17 in bilaterally    Patient will continue to benefit from skilled PT services to address ROM deficits, address strength deficits, analyze and address soft tissue restrictions and analyze and cue movement patterns to attain remaining goals. [x]  See Plan of Care  []  See progress note/recertification  []  See Discharge Summary         Progress towards goals / Updated goals:  Short Term Goals:    to be accomplished within 4 weeks:     Patient will report compliance with prescribed HEP(s) at least 1x/day in order to assist in maximizing therapeutic gains, reduce symptoms, and to progress towards overall prior function.   Pre-Op: HEP issued and reviewed with patient, patient also educated in edema reduction techniques              Post-Op Reassess: Patient states he's been icing and elevating, but hasn't yet performed all of his HEP exercises.  Patient given another copy of his HEP today and was advised to please perform 3x/day to promote further ROM/strength/function.     5/6/22              Last PNt: Patient reports continued daily compliance with his HEP (3x/day).   5/27/22, met     Patient will demonstrate at least 5-100 deg of left knee AROM post-operatively in order to return to prior functional activities and mobility. Pre-Op AROM: 8-125 deg  Post-Op Reassess:  AROM: 7-87 deg; AAROM: 5-93 deg    5/6/22  Last PN: AROM: 2-120 deg 6/1/22 Met           Long Term Goals:     to be accomplished within 9 weeks:     Patient will score at least 64 points on FOTO in order to maximize function and promote patient satisfaction with overall outcome. (Judeth Barrette is an established functional score where 100 = no disability)  Pre-Op FOTO: 52              Post-Op Reassess: 31   5/6/22              Last PN: 47     5/27/22, in progress              Current: 6/22/22 47 - slight regression (possibly due to returning to work)     Patient will report less than 2/10 pain during all functional activities and ADLs in order to improve QOL and return to patient's PLOF. Post-Op Reassess: to be completed s/p surgery  Post-Op Reassess:  6-7/10 pain currently, with narcotics      5/6/22              Last PN: 6/15/22: most pain at night 5/10, 2/10 with ADL/ambulation, ambulation without AD, progressing              Current 6/22/22: pain is 5/10 at worst, sometimes at night after a long day (takes alieve) progressing     Patient will demonstrate functional and painfree AROM of at least 0-120 deg in order to return to prior functional activities and mobility.   Pre-Op AROM: 8-125 deg  Post-Op Reassess:  AROM: 7-87 deg; AAROM: 5-93 deg      5/6/22              Last PN: : Progressing 6/15/22 Left Knee AROM: 1-125,     Patient will demonstrate at least 5/5 strength bilaterally in order to be able to safely perform functional activities and demonstrate improved stability and strength.              Pre-Op Strength: Grossly 5/5 bilaterally except bilateral hip flexion 4+/5  Post-Op Reassess:  (+) extensor lag with mild atrophy of left quad, did not formally test other musculature (will at later date once better AROM is achieved)      5/6/22              Last PN: Status on 6/15/22: Hip: Flex: 5/5 Sohan                                      Abd: 4+/5 Sohan                                      Ext: 4+/5 Sohan                                      IR: Left: 4/5, Right: 4+/5                                      ER: Left: 4/5, Right:5/5                          JMLX: Flex: Left: 4/5, Right: 5/5                                     Ext: Left: 4+/5, Right: 5/5, progressing             Current: Progressing 6/20/22 as per tolerance to exercises        Patient will be able to safely ambulate community distances without and normal gait mechanics in order to improve overall mobility and return patient to his or her PLOF.   Eval: mildly antalgic, lacking bilateral TKE prior to heel strike (pre-op)  Post-Op Reassess:  With RW: stiff knee gait pattern including impaired heel/toe mechanics, lacking TKE prior to heel strike, decreased knee flexion during push off, and decrease knee flexion during swing, slow gait speed, WBOS     5/6/22  Last PN:  progressing 6/15/22 discussed starting walking program at 5-10 min and increase as tolerance allows, patient is ambulatory without AD demonstrating good gait pattern  , progressing  Current status 6/24/22: started regular walking and also doing frequent walking at work visiting [employer], able to walk > 10 min without difficulty, progressing      Patient will be able to safely negotiate a full flight of stairs with a step-through pattern while holding onto at least one handrail in order to return to normal with this functional activity and improve safety on stairs. Eval: FF step-through pattern (pre-op)  Last PN:  Progressing 6/15/22 patient demonstrates good tolerance to  BOSU step ups ,  residual left knee pain with stair descent- mostly using altered gait pattern, normal gait pattern ascending stairs, progressing    Current: progressing 6/20/22 with tolerance to eccentric lowering with max cues from 4 in - per report most challenged at home with descending stairs.     PLAN  []  Upgrade activities as tolerated     [x]  Continue plan of care  []  Update interventions per flow sheet       []  Discharge due to:_  []  Other:_      nAgelic Johns, PT 6/27/2022  7:48 AM    Future Appointments   Date Time Provider Quin[place] Mari   6/29/2022  8:30 AM Keegan Bettencourt, PT, DPT [facility]   7/1/2022  8:30 AM Lupis WilliamHPTBBOYD [facility]

## 2022-06-29 ENCOUNTER — APPOINTMENT (OUTPATIENT)
Dept: PHYSICAL THERAPY | Age: 72
End: 2022-06-29
Payer: MEDICARE

## 2022-06-29 ENCOUNTER — HOSPITAL ENCOUNTER (OUTPATIENT)
Dept: PHYSICAL THERAPY | Age: 72
Discharge: HOME | End: 2022-06-29
Payer: MEDICARE

## 2022-06-29 PROCEDURE — 97110 THERAPEUTIC EXERCISES: CPT

## 2022-06-29 PROCEDURE — 97530 THERAPEUTIC ACTIVITIES: CPT

## 2022-06-29 PROCEDURE — 97112 NEUROMUSCULAR REEDUCATION: CPT

## 2022-06-29 PROCEDURE — 97016 VASOPNEUMATIC DEVICE THERAPY: CPT

## 2022-06-29 NOTE — PROGRESS NOTES
PT DAILY TREATMENT NOTE    Patient Name: Cris Ramirez  Date:2022  : 1950  [x]  Patient  Verified  Payor: Sandrine Luis / Plan: [place] MEDICARE PART A & B / Product Type: Medicare /    In time:8:31 am  Out time:9:35 am  Total Treatment Time (min): 64  Total Timed Codes (min): 54  1:1 Treatment Time (MC/BCBS only): 45   Visit #: 23 of 26    Treatment Dx: Left knee pain [M25.562]    SUBJECTIVE  Pain Level (0-10 scale): 2 - lateral left knee  Any medication changes, allergies to medications, adverse drug reactions, diagnosis change, or new procedure performed?: [x] No    [] Yes (see summary sheet for update)  Subjective functional status/changes:   [] No changes reported  \"Stiff\"  Pt reported numbness, stiffness in lateral left knee and posterior tib on left ankle  Primary complaint is of stiffness with descending steps     OBJECTIVE    Modalities Rationale:     decrease inflammation and decrease pain to improve patient's ability to perform daily activities with decreased pain and symptom levels     min [] Estim, type/location:                                      []  att     []  unatt     []  w/US     []  w/ice    []  w/heat    min []  Mechanical Traction: type/lbs                   []  pro   []  sup   []  int   []  cont    []  before manual    []  after manual    min []  Ultrasound, settings/location:      min []  Iontophoresis w/ dexamethasone, location:                                               []  take home patch       []  in clinic    min []  Ice     []  Heat    location/position:    10 min [x]  Vasopneumatic Device, press/temp:  To left knee in supine with LE elevated     If using vaso (only need to measure limb vaso being performed on)      pre-treatment girth : 40.7 cm      post-treatment girth : 40.3 cm       measured at (landmark location) :      min []  Other:    [x] Skin assessment post-treatment (if applicable):    [x]  intact    []  redness- no adverse reaction                  []redness - adverse reaction:        13 min Therapeutic Exercise:  [] See flow sheet :   Rationale: increase ROM, increase strength, improve coordination and increase proprioception to improve the patients ability to perform daily activities with decreased pain and symptom levels      15 min Therapeutic Activity:  [x]  See flow sheet :   Rationale: increase ROM, increase strength, improve coordination, improve balance and increase proprioception  to improve the patients ability to . smfiun       21 min Neuromuscular Re-education:  [x]  See flow sheet :   Rationale: increase ROM, increase strength, improve coordination, improve balance and increase proprioception  to improve the patients ability to perform daily activities with decreased pain and symptom levels      5 min Manual Therapy:  Manual stretching of left posterior hip capsule in Right S/L    Rationale: decrease pain, increase ROM, increase tissue extensibility, decrease trigger points and increase postural awareness to improve the patients ability to perform daily activities with decreased pain and symptom levels    The manual therapy interventions were performed at a separate and distinct time from the therapeutic activities interventions. With   [] TE   [] TA   [] neuro   [] other: Patient Education: [x] Review HEP    [] Progressed/Changed HEP based on:   [] positioning   [] body mechanics   [] transfers   [] heat/ice application    [] other:      Other Objective/Functional Measures:   Trunk Rot 18 in bilaterally post session   Left Knee Flex AROM: 125*     Pain Level (0-10 scale) post treatment: 0    ASSESSMENT/Changes in Function:   Was able to decrease left lateral knee pain and left posterior tib pain with interventions this session. Pt reports feeling stiff and describes as \"numbness\" at lateral knee and left posterior tib. Suspect that posterior tib pain is coming from gait and inability to load his left LE properly.  Was very challenged with left posterior hip capsule inhibition and left LE adduction in knee to knee activity. Reports most pain with descending stairs. Patient will continue to benefit from skilled PT services to modify and progress therapeutic interventions, address functional mobility deficits, address ROM deficits, address strength deficits, analyze and address soft tissue restrictions, analyze and cue movement patterns, analyze and modify body mechanics/ergonomics, assess and modify postural abnormalities and instruct in home and community integration to attain remaining goals. [x]  See Plan of Care  []  See progress note/recertification  []  See Discharge Summary         Progress towards goals / Updated goals:  Patient will report compliance with prescribed HEP(s) at least 1x/day in order to assist in maximizing therapeutic gains, reduce symptoms, and to progress towards overall prior function. Pre-Op: HEP issued and reviewed with patient, patient also educated in edema reduction techniques              Post-Op Reassess: Patient states he's been icing and elevating, but hasn't yet performed all of his HEP exercises.  Patient given another copy of his HEP today and was advised to please perform 3x/day to promote further ROM/strength/function.     5/6/22              Last PNt: Patient reports continued daily compliance with his HEP (3x/day).   5/27/22, met     Patient will demonstrate at least 5-100 deg of left knee AROM post-operatively in order to return to prior functional activities and mobility. Pre-Op AROM: 8-125 deg  Post-Op Reassess:  AROM: 7-87 deg; AAROM: 5-93 deg    5/6/22  Last PN: AROM: 2-120 deg 6/1/22 Met            Long Term Goals:     to be accomplished within 9 weeks:     Patient will score at least 64 points on FOTO in order to maximize function and promote patient satisfaction with overall outcome.   (Alysha Ma is an established functional score where 100 = no disability)  Pre-Op FOTO: 52              Post-Op Reassess: 31   5/6/22              Last PN: 47     5/27/22, in progress              Current: 6/22/22 47 - slight regression (possibly due to returning to work)     Patient will report less than 2/10 pain during all functional activities and ADLs in order to improve QOL and return to patient's PLOF. Post-Op Reassess: to be completed s/p surgery  Post-Op Reassess:  6-7/10 pain currently, with narcotics      5/6/22              Last PN: 6/15/22: most pain at night 5/10, 2/10 with ADL/ambulation, ambulation without AD, progressing              Current 6/22/22: pain is 5/10 at worst, sometimes at night after a long day (takes alieve) progressing     Patient will demonstrate functional and painfree AROM of at least 0-120 deg in order to return to prior functional activities and mobility.   Pre-Op AROM: 8-125 deg  Post-Op Reassess:  AROM: 7-87 deg; AAROM: 5-93 deg      5/6/22              Last PN: : Progressing 6/15/22 Left Knee AROM: 1-125,    Current: Maintaining above progress 6/29/22 Left Knee AROM Flexion 125*    Patient will demonstrate at least 5/5 strength bilaterally in order to be able to safely perform functional activities and demonstrate improved stability and strength.              Pre-Op Strength: Grossly 5/5 bilaterally except bilateral hip flexion 4+/5  Post-Op Reassess:  (+) extensor lag with mild atrophy of left quad, did not formally test other musculature (will at later date once better AROM is achieved)      5/6/22              Last PN: Status on 6/15/22: Hip: Flex: 5/5 Sohan                                      NTO: 4+/5 Sohan                                      Ext: 4+/5 Sohan                                      IR: Left: 4/5, Right: 4+/5                                      ER: Left: 4/5, Right:5/5                          VBZP: Flex: Left: 4/5, Right: 5/5                                     Ext: Left: 4+/5, Right: 5/5, progressing             Current: Progressing 6/20/22 as per tolerance to exercises        Patient will be able to safely ambulate community distances without and normal gait mechanics in order to improve overall mobility and return patient to his or her PLOF. Eval: mildly antalgic, lacking bilateral TKE prior to heel strike (pre-op)  Post-Op Reassess:  With RW: stiff knee gait pattern including impaired heel/toe mechanics, lacking TKE prior to heel strike, decreased knee flexion during push off, and decrease knee flexion during swing, slow gait speed, WBOS     5/6/22  Last PN:  progressing 6/15/22 discussed starting walking program at 5-10 min and increase as tolerance allows, patient is ambulatory without AD demonstrating good gait pattern  , progressing  Current status 6/24/22: started regular walking and also doing frequent walking at work visiting [employer], able to walk > 10 min without difficulty, progressing      Patient will be able to safely negotiate a full flight of stairs with a step-through pattern while holding onto at least one handrail in order to return to normal with this functional activity and improve safety on stairs. Eval: FF step-through pattern (pre-op)  Last PN:  Progressing 6/15/22 patient demonstrates good tolerance to  BOSU step ups ,  residual left knee pain with stair descent- mostly using altered gait pattern, normal gait pattern ascending stairs, progressing    Current: progressing 6/20/22 with tolerance to eccentric lowering with max cues from 4 in - per report most challenged at home with descending stairs.     PLAN  []  Upgrade activities as tolerated     [x]  Continue plan of care  []  Update interventions per flow sheet       []  Discharge due to:_  []  Other:_      Eleanor Stern, PT, DPT 6/29/2022  8:55 AM    Future Appointments   Date Time Provider Quincy Mari   7/1/2022  8:30 AM [facility]

## 2022-07-01 ENCOUNTER — HOSPITAL ENCOUNTER (OUTPATIENT)
Dept: PHYSICAL THERAPY | Age: 72
Discharge: HOME | End: 2022-07-01
Payer: MEDICARE

## 2022-07-01 PROCEDURE — 97016 VASOPNEUMATIC DEVICE THERAPY: CPT

## 2022-07-01 PROCEDURE — 97112 NEUROMUSCULAR REEDUCATION: CPT

## 2022-07-01 PROCEDURE — 97110 THERAPEUTIC EXERCISES: CPT

## 2022-07-01 PROCEDURE — 97530 THERAPEUTIC ACTIVITIES: CPT

## 2022-07-01 NOTE — PROGRESS NOTES
PT DAILY TREATMENT NOTE    Patient Name: Terri Wong  Date:2022  : 1950  [x]  Patient  Verified  [place]or: [place] / Plan: [place] MED[place] A & B / Product Type: Medicare /    In time:8:33  Out time:931  Total Treatment Time (min): 58  Total Timed Codes (min): 58  1:1 Treatment Time (MC/BCBS only): 62   Visit #: 24 of 26    Treatment Dx: Left knee pain [M25.562]    SUBJECTIVE  Pain Level (0-10 scale): 1  Any medication changes, allergies to medications, adverse drug reactions, diagnosis change, or new procedure performed?: [x] No    [] Yes (see summary sheet for update)  Subjective functional status/changes:   [] No changes reported  \"Still stiff. Going down stairs is still hard. \"    OBJECTIVE    Modalities Rationale:     decrease edema, decrease inflammation and decrease pain to improve patient's ability to perform daily activities with decreased pain and symptom levels   min [] Estim, type/location:                                      []  att     []  unatt     []  w/US     []  w/ice    []  w/heat    min []  Mechanical Traction: type/lbs                   []  pro   []  sup   []  int   []  cont    []  before manual    []  after manual    min []  Ultrasound, settings/location:      min []  Iontophoresis w/ dexamethasone, location:                                               []  take home patch       []  in clinic    min []  Ice     []  Heat    location/position:    10 min [x]  Vasopneumatic Device, press/temp: Supine, left knee    If using vaso (only need to measure limb vaso being performed on)      pre-treatment girth : 43cm      post-treatment girth : 40.9cm      measured at (landmark location) :  Joint line     min []  Other:    [x] Skin assessment post-treatment (if applicable):    [x]  intact    []  redness- no adverse reaction                  []redness - adverse reaction:         15 min Therapeutic Exercise:  [x] See flow sheet :   Rationale: increase ROM and increase strength to improve the patients ability to perform daily activities with decreased pain and symptom levels    18 min Therapeutic Activity:  [x]  See flow sheet :   Rationale: increase strength, improve coordination and increase proprioception  to improve the patients ability to perform daily activities with decreased pain and symptom levels     15 min Neuromuscular Re-education:  [x]  See flow sheet :   Rationale: improve coordination, improve balance and increase proprioception  to improve the patients ability to perform daily activities with decreased pain and symptom levels      With   [] TE   [] TA   [] neuro   [] other: Patient Education: [x] Review HEP    [] Progressed/Changed HEP based on:   [] positioning   [] body mechanics   [] transfers   [] heat/ice application    [] other:      Other Objective/Functional Measures: see goals below     Pain Level (0-10 scale) post treatment: 0    ASSESSMENT/Changes in Function: Pt presented to therapy with c/c left knee pain s/p left TKR on 5/4/22. Pt has attended 24 sessions including eval to address deficits in ROM, strength, gait and functional mobiltiy. Pt reports feeling stiff and describes as \"numbness\" at lateral knee and left posterior tib. Suspect that posterior tib pain is coming from gait and inability to load his left LE properly with ability to decrease with interventions in clinic. Pt reporting max pain still 5/10 with descending stairs and walking/standing on left LE for long periods > 10min possibly due to continued strength deficits especially in gluts. Left knee ROM is almost [facility] however continues to lack full terminal knee extension. Pt would benefit from continued skilled PT services to address remaining unmet goals below to allow pt walk and manage stairs at home with less knee pain.        Patient will continue to benefit from skilled PT services to modify and progress therapeutic interventions, address functional mobility deficits, address ROM deficits, address strength deficits, analyze and address soft tissue restrictions, analyze and cue movement patterns, analyze and modify body mechanics/ergonomics, assess and modify postural abnormalities and instruct in home and community integration to attain remaining goals. [x]  See Plan of Care  []  See progress note/recertification  []  See Discharge Summary         Progress towards goals / Updated goals:  Patient will report compliance with prescribed HEP(s) at least 1x/day in order to assist in maximizing therapeutic gains, reduce symptoms, and to progress towards overall prior function. Pre-Op: HEP issued and reviewed with patient, patient also educated in edema reduction techniques              Post-Op Reassess: Patient states he's been icing and elevating, but hasn't yet performed all of his HEP exercises.  Patient given another copy of his HEP today and was advised to please perform 3x/day to promote further ROM/strength/function.     5/6/22              Last PNt: Patient reports continued daily compliance with his HEP (3x/day).   5/27/22, met     Patient will demonstrate at least 5-100 deg of left knee AROM post-operatively in order to return to prior functional activities and mobility. Pre-Op AROM: 8-125 deg  Post-Op Reassess:  AROM: 7-87 deg; AAROM: 5-93 deg    5/6/22  Last PN: AROM: 2-120 deg 6/1/22 Met      Long Term Goals:     to be accomplished within 9 weeks:   Patient will score at least 64 points on FOTO in order to maximize function and promote patient satisfaction with overall outcome. (Alyssa Henderson is an established functional score where 100 = no disability)  Pre-Op FOTO: 52              Post-Op Reassess: 31   5/6/22              Last PN: 47     5/27/22, in progress              Current: 6/22/22 47 - slight regression (possibly due to returning to work)     Patient will report less than 2/10 pain during all functional activities and ADLs in order to improve QOL and return to patient's PLOF.   Post-Op Reassess: to be completed s/p surgery  Post-Op Reassess:  6-7/10 pain currently, with narcotics      5/6/22              Last PN: 6/15/22: most pain at night 5/10, 2/10 with ADL/ambulation, ambulation without AD, progressing              Current 5/10 max pain with descending stairs mostly slow progress 7/1/22     Patient will demonstrate functional and painfree AROM of at least 0-120 deg in order to return to prior functional activities and mobility.   Pre-Op AROM: 8-125 deg  Post-Op Reassess:  AROM: 7-87 deg; AAROM: 5-93 deg      5/6/22              Last PN: : Progressing 6/15/22 Left Knee AROM: 1-125,              Current: 2-118* pre, 1-128*  Post session progressing 7/1/22     Patient will demonstrate at least 5/5 strength bilaterally in order to be able to safely perform functional activities and demonstrate improved stability and strength.              Pre-Op Strength: Grossly 5/5 bilaterally except bilateral hip flexion 4+/5  Post-Op Reassess:  (+) extensor lag with mild atrophy of left quad, did not formally test other musculature (will at later date once better AROM is achieved)      5/6/22              Last PN: Status on 6/15/22: Hip: Flex: 5/5 Sohan                                      Abd: 4+/5 Sohan                                      Ext: 4+/5 Sohan                                      IR: Left: 4/5, Right: 4+/5                                      ER: Left: 4/5, Right:5/5                          DBYK: Flex: Left: 4/5, Right: 5/5                                     Ext: Left: 4+/5, Right: 5/5, progressing             Current:   progressing 7/1/22     Hip: Flex: 5/5 bilaterally                                      Abd: 3+/5 bilaterally                                      IR: Left: 3+/5 Right: 3+/5                                      ER: Left: 3+/5, Right: 3+/5                          Knee: Flex: Left: 5/5, Right: 5/5                                     Ext: Left: 5/5, Right: 5/5     Patient will be able to safely ambulate community distances without and normal gait mechanics in order to improve overall mobility and return patient to his or her PLOF. Eval: mildly antalgic, lacking bilateral TKE prior to heel strike (pre-op)  Post-Op Reassess:  With RW: stiff knee gait pattern including impaired heel/toe mechanics, lacking TKE prior to heel strike, decreased knee flexion during push off, and decrease knee flexion during swing, slow gait speed, WBOS     5/6/22  Last PN:  progressing 6/15/22 discussed starting walking program at 5-10 min and increase as tolerance allows, patient is ambulatory without AD demonstrating good gait pattern  , progressing  Current status: pt reporting ability to walk 10 min prior to noticing pain and tightness in left knee/ankle progressing 7/1/22     Patient will be able to safely negotiate a full flight of stairs with a step-through pattern while holding onto at least one handrail in order to return to normal with this functional activity and improve safety on stairs.   Eval: FF step-through pattern (pre-op)  Last PN:  Progressing 6/15/22 patient demonstrates good tolerance to  BOSU step ups ,  residual left knee pain with stair descent- mostly using altered gait pattern, normal gait pattern ascending stairs  Current: pt reporting continued difficulty with descending stairs progressing 7/1/22        PLAN  []  Upgrade activities as tolerated     [x]  Continue plan of care  []  Update interventions per flow sheet       []  Discharge due to:_  []  Other:_      Jet Interiano 7/1/2022  7:49 AM    Future Appointments   Date Time Provider [place]   7/1/2022  8:30 AM Lolita William [facility]   7/8/2022  9:15 AM Melisa Sic, PT, DPT [facility]   7/11/2022  7:45 AM Melisa Sic, PT, DPT [facility]   7/18/2022  7:45 AM Melisa Sic, PT, DPT [facility]   7/25/2022  7:45 AM Melisa Sic, PT, DPT [facility]   8/1/2022  8:30 AM Melisa Sic, PT, DPT [facility]   8/3/2022  7:45 AM Nataly Smyth, Amandeep Dela Cruz, DPT [facility]   8/8/2022  8:30 AM Howard Acosta, PT, DPT [facility]   8/10/2022  7:45 AM Howard Acosta PT, DPT [facility]   8/15/2022  8:30 AM Howard Acosta PT, DPT [facility]   8/17/2022  7:45 AM Howard Acosta, PT, DPT [facility]   8/22/2022  8:30 AM Howard Acosta PT, DPT [facility]   8/24/2022  7:45 AM Howard Acosta PT, DPT [facility]   8/29/2022  8:30 AM Howard Acosta, PT, DPT [facility]   8/31/2022  7:45 AM Howard Acosta, PT, DPT [facility]

## 2022-07-01 NOTE — PROGRESS NOTES
[facility] at the [address], [place], [address] [facility]  Phone: [phone]      Fax:  [phone]    Continued Plan of Care/ Re-certification for Physical Therapy Services    Patient name: Barbara Rojas Start of Care: 22   Referral source: Maci Li MD : [id number]   Medical/Treatment Diagnosis: Left knee pain [M25.562] Onset Date:2022     Prior Hospitalization: see medical history Provider#: [id number]   Medications: Verified on Patient Summary List    Comorbidities: s/p right TKR in , cataract surgery, facial cyst removal, prostate issues (no cancer), hypertension, high cholesterol, sleep apnea  Prior Level of Function:   Unrestricted with functional activities and ADLs  [x]???? No assistive device  [x]???? active lifestyle, []???? moderately active lifestyle, []???? sedentary lifestyle    Visits from Start of Care: 24    Missed Visits: 0    Reporting Period: 22 to 22    The Plan of Care and following information is based on the patient's current status:    Key functional changes: improving walking tolerance to 10min prior to pain, WFL left knee ROM flexion     Problems/ barriers to goal attainment: pain and \"numbness\" in lateral knee and posterior tib, decreased glut and eccentric quad strength possibly contributing to continued difficulty with descending stairs,  decreased left terminal knee extension, poor loading of left LE in stance     Problem List: pain affecting function, decrease ROM, decrease strength, impaired gait/ balance, decrease ADL/ functional abilitiies, decrease activity tolerance, decrease flexibility/ joint mobility and decrease transfer abilities    Treatment Plan: Therapeutic exercise, Therapeutic activities, Neuromuscular re-education, Physical agent/modality, Gait/balance training, Manual therapy, Patient education, Self Care training, Functional mobility training and Stair training     Goals for this certification period to be accomplished in 8 weeks:  Patient will report compliance with prescribed HEP(s) at least 1x/day in order to assist in maximizing therapeutic gains, reduce symptoms, and to progress towards overall prior function. Pre-Op: HEP issued and reviewed with patient, patient also educated in edema reduction techniques              Post-Op Reassess: Patient states he's been icing and elevating, but hasn't yet performed all of his HEP exercises.  Patient given another copy of his HEP today and was advised to please perform 3x/day to promote further ROM/strength/function.     5/6/22              Last PNt: Patient reports continued daily compliance with his HEP (3x/day).   5/27/22, met     Patient will demonstrate at least 5-100 deg of left knee AROM post-operatively in order to return to prior functional activities and mobility. Pre-Op AROM: 8-125 deg  Post-Op Reassess:  AROM: 7-87 deg; AAROM: 5-93 deg    5/6/22  Last PN: AROM: 2-120 deg 6/1/22 Met      Long Term Goals:     to be accomplished within 9 weeks:   Patient will score at least 64 points on FOTO in order to maximize function and promote patient satisfaction with overall outcome. (Chari Kiang is an established functional score where 100 = no disability)  Pre-Op FOTO: 52              Post-Op Reassess: 31   5/6/22              Last PN: 47     5/27/22, in progress              Current: 6/22/22 47 - slight regression (possibly due to returning to work)     Patient will report less than 2/10 pain during all functional activities and ADLs in order to improve QOL and return to patient's PLOF.   Post-Op Reassess: to be completed s/p surgery  Post-Op Reassess:  6-7/10 pain currently, with narcotics      5/6/22              Last PN: 6/15/22: most pain at night 5/10, 2/10 with ADL/ambulation, ambulation without AD, progressing              Current 5/10 max pain with descending stairs mostly slow progress 7/1/22     Patient will demonstrate functional and painfree AROM of at least 0-120 deg in order to return to prior functional activities and mobility. Pre-Op AROM: 8-125 deg  Post-Op Reassess:  AROM: 7-87 deg; AAROM: 5-93 deg      5/6/22              Last PN: : Progressing 6/15/22 Left Knee AROM: 1-125,              Current: 2-118* pre, 1-128*  Post session progressing 7/1/22     Patient will demonstrate at least 5/5 strength bilaterally in order to be able to safely perform functional activities and demonstrate improved stability and strength.              Pre-Op Strength: Grossly 5/5 bilaterally except bilateral hip flexion 4+/5  Post-Op Reassess:  (+) extensor lag with mild atrophy of left quad, did not formally test other musculature (will at later date once better AROM is achieved)      5/6/22              Last PN: Status on 6/15/22: Hip: Flex: 5/5 Sohan                                      Abd: 4+/5 Sohan                                      Ext: 4+/5 Sohan                                      IR: Left: 4/5, Right: 4+/5                                      ER: Left: 4/5, Right:5/5                          HSXN: Flex: Left: 4/5, Right: 5/5                                     Ext: Left: 4+/5, Right: 5/5, progressing             Current:   progressing 7/1/22                                      Hip: Flex: 5/5 bilaterally                                      Abd: 3+/5 bilaterally                                      IR: Left: 3+/5 Right: 3+/5                                      ER: Left: 3+/5, Right: 3+/5                          Knee: Flex: Left: 5/5, Right: 5/5                                     Ext: Left: 5/5, Right: 5/5     Patient will be able to safely ambulate community distances without and normal gait mechanics in order to improve overall mobility and return patient to his or her PLOF.   Eval: mildly antalgic, lacking bilateral TKE prior to heel strike (pre-op)  Post-Op Reassess:  With RW: stiff knee gait pattern including impaired heel/toe mechanics, lacking TKE prior to heel strike, decreased knee flexion during push off, and decrease knee flexion during swing, slow gait speed, WBOS     5/6/22  Last PN:  progressing 6/15/22 discussed starting walking program at 5-10 min and increase as tolerance allows, patient is ambulatory without AD demonstrating good gait pattern  , progressing  Current status: pt reporting ability to walk 10 min prior to noticing pain and tightness in left knee/ankle progressing 7/1/22     Patient will be able to safely negotiate a full flight of stairs with a step-through pattern while holding onto at least one handrail in order to return to normal with this functional activity and improve safety on stairs. Eval: FF step-through pattern (pre-op)  Last PN:  Progressing 6/15/22 patient demonstrates good tolerance to  BOSU step ups ,  residual left knee pain with stair descent- mostly using altered gait pattern, normal gait pattern ascending stairs  Current: pt reporting continued difficulty with descending stairs progressing 7/1/22      Frequency / Duration: Patient to be seen 2 times per week for 8 weeks:    Assessment / Recommendations:Pt presented to therapy with c/c left knee pain s/p left TKR on 5/4/22. Pt has attended 24 sessions including eval to address deficits in ROM, strength, gait and functional mobiltiy. Pt reports feeling stiff and describes as \"numbness\" at lateral knee and left posterior tib. Suspect that posterior tib pain is coming from gait and inability to load his left LE properly with ability to decrease with interventions in clinic. Pt reporting max pain still 5/10 with descending stairs and walking/standing on left LE for long periods > 10min possibly due to continued strength deficits especially in gluts. Left knee ROM is almost [facility] however continues to lack full terminal knee extension.  Pt would benefit from continued skilled PT services to address remaining unmet goals below to allow pt walk and manage stairs at home with less knee pain. New Certification Period: 7/1/22 - 9/1/22      Stephanie CADET Remesi 7/1/2022 8:12 AM    ________________________________________________________________________  I certify that the above Therapy Services are being furnished while the patient is under my care. I agree with the treatment plan and certify that this therapy is necessary. [] I have read the above and request that my patient continue as recommended.   [] I have read the above report and request that my patient continue therapy with the following changes/special instructions: _______________________________________  [] I have read the above report and request that my patient be discharged from therapy    Physician's Signature:____________________ Date:_________ TIME:________                                      Liliya Todd MD      ** Signature, Date and Time must be completed for valid certification **    Please sign and return to [facility] at the [address], [address] [facility]  Phone: [phone]      Fax:  [phone]

## 2022-07-06 ENCOUNTER — HOSPITAL ENCOUNTER (OUTPATIENT)
Dept: PHYSICAL THERAPY | Age: 72
Discharge: HOME | End: 2022-07-06
Payer: MEDICARE

## 2022-07-06 PROCEDURE — 97112 NEUROMUSCULAR REEDUCATION: CPT

## 2022-07-06 PROCEDURE — 97110 THERAPEUTIC EXERCISES: CPT

## 2022-07-06 PROCEDURE — 97016 VASOPNEUMATIC DEVICE THERAPY: CPT

## 2022-07-06 PROCEDURE — 97530 THERAPEUTIC ACTIVITIES: CPT

## 2022-07-06 NOTE — PROGRESS NOTES
PT DAILY TREATMENT NOTE    Patient Name: Jens Bui  Date:2022  : 1950  [x]  Patient  Verified  Payor: [place] MED[place] / Plan: [place] MED[place]ARE PART A & B / Product Type: Medicare /    In time:10:04 am  Out time:11:10 am  Total Treatment Time (min): 66  Total Timed Codes (min): 51  1:1 Treatment Time (MC/BCBS only): 45   Visit #: 25 of 40    Treatment Dx: Left knee pain [M25.562]    SUBJECTIVE  Pain Level (0-10 scale): 0  Any medication changes, allergies to medications, adverse drug reactions, diagnosis change, or new procedure performed?: [x] No    [] Yes (see summary sheet for update)  Subjective functional status/changes:   [] No changes reported  No pain today but lateral left knee pain last night especially with descending stairs     OBJECTIVE    Modalities Rationale:     decrease edema, decrease inflammation and decrease pain to improve patient's ability to perform daily activities with decreased pain and symptom levels     min [] Estim, type/location:                                      []  att     []  unatt     []  w/US     []  w/ice    []  w/heat    min []  Mechanical Traction: type/lbs                   []  pro   []  sup   []  int   []  cont    []  before manual    []  after manual    min []  Ultrasound, settings/location:      min []  Iontophoresis w/ dexamethasone, location:                                               []  take home patch       []  in clinic    min []  Ice     []  Heat    location/position:    10 min [x]  Vasopneumatic Device, press/temp:  To left knee in supine with LE elevated     If using vaso (only need to measure limb vaso being performed on)      pre-treatment girth : 42 cm cm      post-treatment girth : 41.2 cm      measured at (landmark location) :  Mid patella     min []  Other:    [x] Skin assessment post-treatment (if applicable):    [x]  intact    []  redness- no adverse reaction                  []redness - adverse reaction:        16 min Therapeutic Exercise:  [x] See flow sheet :   Rationale: increase ROM, increase strength, improve coordination and increase proprioception to improve the patients ability to perform daily activities with decreased pain and symptom levels      15 min Therapeutic Activity:  [x]  See flow sheet :   Rationale: increase ROM, increase strength, improve coordination and increase proprioception  to improve the patients ability to perform daily activities with decreased pain and symptom levels       20 min Neuromuscular Re-education:  [x]  See flow sheet :   Rationale: increase ROM, increase strength, improve coordination, improve balance and increase proprioception  to improve the patients ability to perform daily activities with decreased pain and symptom levels          With   [] TE   [] TA   [] neuro   [] other: Patient Education: [x] Review HEP    [] Progressed/Changed HEP based on:   [] positioning   [] body mechanics   [] transfers   [] heat/ice application    [] other:      Other Objective/Functional Measures:   Left Knee AROM Ext: lacking 1*   Attempted drop bridges to work glut thru range of motion - had increase in left lateral knee but no lateral knee pain with regular bridges - indicates glut insufficiency bilaterally     Pain Level (0-10 scale) post treatment: 0    ASSESSMENT/Changes in Function:   Tolerated session well. Challenged with eccentric heel downs. Plan to add glut med and glut max in ext next session. Patient will continue to benefit from skilled PT services to modify and progress therapeutic interventions, address functional mobility deficits, address ROM deficits, address strength deficits, analyze and address soft tissue restrictions, analyze and cue movement patterns, analyze and modify body mechanics/ergonomics, assess and modify postural abnormalities, address imbalance/dizziness and instruct in home and community integration to attain remaining goals.      [x]  See Plan of Care  []  See progress note/recertification  []  See Discharge Summary         Progress towards goals / Updated goals:  Patient will report compliance with prescribed HEP(s) at least 1x/day in order to assist in maximizing therapeutic gains, reduce symptoms, and to progress towards overall prior function. Pre-Op: HEP issued and reviewed with patient, patient also educated in edema reduction techniques              Post-Op Reassess: Patient states he's been icing and elevating, but hasn't yet performed all of his HEP exercises.  Patient given another copy of his HEP today and was advised to please perform 3x/day to promote further ROM/strength/function.     5/6/22              Last PNt: Patient reports continued daily compliance with his HEP (3x/day).   5/27/22, met     Patient will demonstrate at least 5-100 deg of left knee AROM post-operatively in order to return to prior functional activities and mobility. Pre-Op AROM: 8-125 deg  Post-Op Reassess:  AROM: 7-87 deg; AAROM: 5-93 deg    5/6/22  Last PN: AROM: 2-120 deg 6/1/22 Met      Long Term Goals:     to be accomplished within 9 weeks:   Patient will score at least 64 points on FOTO in order to maximize function and promote patient satisfaction with overall outcome. ([place] is an established functional score where 100 = no disability)  Pre-Op FOTO: 52              Post-Op Reassess: 31   5/6/22              Last PN:6/22/22 47 - slight regression (possibly due to returning to work)     Patient will report less than 2/10 pain during all functional activities and ADLs in order to improve QOL and return to patient's PLOF. Post-Op Reassess: to be completed s/p surgery  Post-Op Reassess:  6-7/10 pain currently, with narcotics      5/6/22              Last PN:  5/10 max pain with descending stairs mostly slow progress 7/1/22     Patient will demonstrate functional and painfree AROM of at least 0-120 deg in order to return to prior functional activities and mobility.   Pre-Op AROM: 8-125 deg  Post-Op Reassess:  AROM: 7-87 deg; AAROM: 5-93 deg      5/6/22              Last PN: : 2-118* TOF, 2-731*  TMOH session progressing 7/1/22   Current: Maintaining above 7/6/22     Patient will demonstrate at least 5/5 strength bilaterally in order to be able to safely perform functional activities and demonstrate improved stability and strength.              Pre-Op Strength: Grossly 5/5 bilaterally except bilateral hip flexion 4+/5  Post-Op Reassess:  (+) extensor lag with mild atrophy of left quad, did not formally test other musculature (will at later date once better AROM is achieved)      5/6/22              Last PN:progressing 7/1/22                                      Hip: Flex: 5/5 bilaterally                                      Abd: 3+/5 bilaterally                                      IR: Left: 3+/5 Right: 3+/5                                      ER: Left: 3+/5, Right: 3+/5                          Knee: Flex: Left: 5/5, Right: 5/5                                     Ext: Left: 5/5, Right: 5/5     Patient will be able to safely ambulate community distances without and normal gait mechanics in order to improve overall mobility and return patient to his or her PLOF. Eval: mildly antalgic, lacking bilateral TKE prior to heel strike (pre-op)  Post-Op Reassess:  With RW: stiff knee gait pattern including impaired heel/toe mechanics, lacking TKE prior to heel strike, decreased knee flexion during push off, and decrease knee flexion during swing, slow gait speed, WBOS     5/6/22  Last PN:   status: pt reporting ability to walk 10 min prior to noticing pain and tightness in left knee/ankle progressing 7/1/22     Patient will be able to safely negotiate a full flight of stairs with a step-through pattern while holding onto at least one handrail in order to return to normal with this functional activity and improve safety on stairs.   Eval: FF step-through pattern (pre-op)  Last PN:  pt reporting continued difficulty with descending stairs progressing 7/1/22        PLAN  []  Upgrade activities as tolerated     [x]  Continue plan of care  []  Update interventions per flow sheet       []  Discharge due to:_  []  Other:_      Erik Aguilar PT, DPT 7/6/2022  9:50 AM    Future Appointments   Date Time Provider [place]   7/6/2022 10:00 AM Nick Aguirre, PT, DPT [facility]   7/8/2022  9:15 AM Nick Aguirre, PT, DPT [facility]   7/11/2022  7:45 AM Nick Aguirre, PT, DPT [facility]   7/18/2022  7:45 AM Nick Aguirre, PT, DPT [facility]   7/25/2022  7:45 AM Nick Aguirre, PT, DPT [facility]   8/1/2022  8:30 AM Nick Aguirre, PT, DPT [facility]   8/3/2022  7:45 AM Nick Aguirre, PT, DPT [facility]   8/8/2022  8:30 AM Nick Aguirre, PT, DPT [facility]   8/10/2022  7:45 AM Nick Aguirre, PT, DPT [facility]   8/15/2022  8:30 AM Nick Aguirre, PT, DPT [facility]   8/17/2022  7:45 AM Nick Aguirre, PT, DPT [facility]   8/22/2022  8:30 AM Nick Aguirre, PT, DPT [facility]   8/24/2022  7:45 AM Nick Aguirre, PT, DPT [facility]   8/29/2022  8:30 AM Nick Aguirre PT, JOSE SURESH [facility]   8/31/2022  7:45 AM Nick Aguirre PT, JOSE SURESH [facility]

## 2022-07-08 ENCOUNTER — HOSPITAL ENCOUNTER (OUTPATIENT)
Dept: PHYSICAL THERAPY | Age: 72
Discharge: HOME | End: 2022-07-08
Payer: MEDICARE

## 2022-07-08 PROCEDURE — 97530 THERAPEUTIC ACTIVITIES: CPT

## 2022-07-08 PROCEDURE — 97110 THERAPEUTIC EXERCISES: CPT

## 2022-07-08 PROCEDURE — 97112 NEUROMUSCULAR REEDUCATION: CPT

## 2022-07-08 NOTE — PROGRESS NOTES
PT DAILY TREATMENT NOTE    Patient Name: Sylvie Swain  Date:2022  : 1950  [x]  Patient  Verified  Payor: [place] MEDICARE / Plan: [place] MEDICARE PART A & B / Product Type: Medicare /    In time:9:08 am  Out time:10:10 am   Total Treatment Time (min): 62  Total Timed Codes (min): 62  1:1 Treatment Time (MC/BCBS only): 48   Visit #: 26 of 40    Treatment Dx: Left knee pain [M25.562]    SUBJECTIVE  Pain Level (0-10 scale): 0-2  Any medication changes, allergies to medications, adverse drug reactions, diagnosis change, or new procedure performed?: [x] No    [] Yes (see summary sheet for update)  Subjective functional status/changes:   [] No changes reported  \"He (MD) said I am going to be ok. \"    OBJECTIVE    20 min Therapeutic Exercise:  [x] See flow sheet :   Rationale: increase ROM, increase strength, improve coordination and increase proprioception to improve the patients ability to perform daily activities with decreased pain and symptom levels      23 min Therapeutic Activity:  [x]  See flow sheet :discussion of pt's MD appointment, progress   Rationale: increase ROM, increase strength, improve coordination, improve balance and increase proprioception  to improve the patients ability to perform daily activities with decreased pain and symptom levels       19 min Neuromuscular Re-education:  [x]  See flow sheet :   Rationale: increase ROM, increase strength, improve coordination, improve balance and increase proprioception  to improve the patients ability to perform daily activities with decreased pain and symptom levels             With   [] TE   [] TA   [] neuro   [] other: Patient Education: [x] Review HEP    [] Progressed/Changed HEP based on:   [] positioning   [] body mechanics   [] transfers   [] heat/ice application    [] other:      Other Objective/Functional Measures:   Left Knee AROM Flexion: 124*     Gait: increased left toe out and lack of great toe ext bilaterally     Pain Level (0-10 scale) post treatment: 0    ASSESSMENT/Changes in Function:   Pt tolerated session well. Improved carryover with eccentric heel downs. Increased pt education on healing process and current progress. Pt is concerned about lateral knee pain and flexion. Reassured that is right on track with progress. Added eccentric leg press with good tolerance. Challenged with 90-90 hip shift. Patient will continue to benefit from skilled PT services to modify and progress therapeutic interventions, address functional mobility deficits, address ROM deficits, address strength deficits, analyze and address soft tissue restrictions, analyze and cue movement patterns, analyze and modify body mechanics/ergonomics, assess and modify postural abnormalities and instruct in home and community integration to attain remaining goals. [x]  See Plan of Care  []  See progress note/recertification  []  See Discharge Summary         Progress towards goals / Updated goals:  Patient will report compliance with prescribed HEP(s) at least 1x/day in order to assist in maximizing therapeutic gains, reduce symptoms, and to progress towards overall prior function. Pre-Op: HEP issued and reviewed with patient, patient also educated in edema reduction techniques              Post-Op Reassess: Patient states he's been icing and elevating, but hasn't yet performed all of his HEP exercises.  Patient given another copy of his HEP today and was advised to please perform 3x/day to promote further ROM/strength/function.     5/6/22              Last PNt: Patient reports continued daily compliance with his HEP (3x/day).   5/27/22, met     Patient will demonstrate at least 5-100 deg of left knee AROM post-operatively in order to return to prior functional activities and mobility.   Pre-Op AROM: 8-125 deg  Post-Op Reassess:  AROM: 7-87 deg; AAROM: 5-93 deg    5/6/22  Last PN: AROM: 2-120 deg 6/1/22 Met      Long Term Goals:     to be accomplished within 9 weeks:   Patient will score at least 64 points on FOTO in order to maximize function and promote patient satisfaction with overall outcome. ([place]mael [place] is an established functional score where 100 = no disability)  Pre-Op FOTO: 52              Post-Op Reassess: 31   5/6/22              Last PN:6/22/22 47 - slight regression (possibly due to returning to work)     Patient will report less than 2/10 pain during all functional activities and ADLs in order to improve QOL and return to patient's PLOF. Post-Op Reassess: to be completed s/p surgery  Post-Op Reassess:  6-7/10 pain currently, with narcotics      5/6/22              Last PN:  5/10 max pain with descending stairs mostly slow progress 7/1/22     Patient will demonstrate functional and painfree AROM of at least 0-120 deg in order to return to prior functional activities and mobility.   Pre-Op AROM: 8-125 deg  Post-Op Reassess:  AROM: 7-87 deg; AAROM: 5-93 deg      5/6/22              Last PN: : 2-118* BEJ, 9-014*  MXXH session progressing 7/1/22              Current: Maintaining above 7/6/22     Patient will demonstrate at least 5/5 strength bilaterally in order to be able to safely perform functional activities and demonstrate improved stability and strength.              Pre-Op Strength: Grossly 5/5 bilaterally except bilateral hip flexion 4+/5  Post-Op Reassess:  (+) extensor lag with mild atrophy of left quad, did not formally test other musculature (will at later date once better AROM is achieved)      5/6/22              Last PN:progressing 7/1/22                                      Hip: Flex: 5/5 bilaterally                                      Abd: 3+/5 bilaterally                                      IR: Left: 3+/5 Right: 3+/5                                      ER: Left: 3+/5, Right: 3+/5                          Knee: Flex: Left: 5/5, Right: 5/5                                     Ext: Left: 5/5, Right: 5/5   Current: progressing 7/8/22 per tolerance to exercises, challenged with glut facilitation      Patient will be able to safely ambulate community distances without and normal gait mechanics in order to improve overall mobility and return patient to his or her PLOF. Eval: mildly antalgic, lacking bilateral TKE prior to heel strike (pre-op)  Post-Op Reassess:  With RW: stiff knee gait pattern including impaired heel/toe mechanics, lacking TKE prior to heel strike, decreased knee flexion during push off, and decrease knee flexion during swing, slow gait speed, WBOS     5/6/22  Last PN:   status: pt reporting ability to walk 10 min prior to noticing pain and tightness in left knee/ankle progressing 7/1/22     Patient will be able to safely negotiate a full flight of stairs with a step-through pattern while holding onto at least one handrail in order to return to normal with this functional activity and improve safety on stairs.   Eval: FF step-through pattern (pre-op)  Last PN:  pt reporting continued difficulty with descending stairs progressing 7/1/22     PLAN  []  Upgrade activities as tolerated     [x]  Continue plan of care  []  Update interventions per flow sheet       []  Discharge due to:_  []  Other:_      Alejandra Landin PT, DPT 7/8/2022  9:24 AM    Future Appointments   Date Time Provider [place]   7/11/2022  7:45 AM Shelby Hoffman PT, DPT [facility]   7/18/2022  7:45 AM Shelby Hoffman PT, DPT [facility]   7/25/2022  7:45 AM Shelby Hoffman PT, DPT [facility]   8/1/2022  8:30 AM Shelby Hoffman PT, DPT [facility]   8/3/2022  7:45 AM Shelby Hoffman PT, DPT [facility]   8/8/2022  8:30 AM Shelby Hoffman PT, DPT [facility]   8/10/2022  7:45 AM Shelby Hoffman PT, DPT [facility]   8/15/2022  8:30 AM Shelby Hoffman PT, DPT [facility]   8/17/2022  7:45 AM Shelby Hoffman PT, DPT [facility]   8/22/2022  8:30 AM Alvarado Castro PT [facility]   8/24/2022  7:45 AM Shelby Hoffman PT, DPT [facility]   8/29/2022  8:30 AM Alvarado Castro PT [facility]   8/31/2022  7:45 AM Hannah Chase, PT, DPT [id number]

## 2022-07-11 ENCOUNTER — HOSPITAL ENCOUNTER (OUTPATIENT)
Dept: PHYSICAL THERAPY | Age: 72
Discharge: HOME | End: 2022-07-11
Payer: MEDICARE

## 2022-07-11 PROCEDURE — 97110 THERAPEUTIC EXERCISES: CPT

## 2022-07-11 PROCEDURE — 97530 THERAPEUTIC ACTIVITIES: CPT

## 2022-07-11 PROCEDURE — 97112 NEUROMUSCULAR REEDUCATION: CPT

## 2022-07-11 NOTE — PROGRESS NOTES
PT DAILY TREATMENT NOTE    Patient Name: Bart Shannon  Date:2022  : 1950  [x]  Patient  Verified  Payor: Crescencio Bernal / Plan: [place] / Product Type: Medicare /    In time:7:45 am  Out time:8:41 am  Total Treatment Time (min): 56  Total Timed Codes (min): 56  1:1 Treatment Time (MC/BCBS only): 50   Visit #: 27 of 40    Treatment Dx: Left knee pain [M25.562]    SUBJECTIVE  Pain Level (0-10 scale): 0-2  Any medication changes, allergies to medications, adverse drug reactions, diagnosis change, or new procedure performed?: [x] No    [] Yes (see summary sheet for update)  Subjective functional status/changes:   [] No changes reported  \"I am good. I think that strength training really makes a difference. \"    OBJECTIVE      16 min Therapeutic Exercise:  [x] See flow sheet :   Rationale: increase ROM, increase strength, improve coordination and increase proprioception to improve the patients ability to perform daily activities with decreased pain and symptom levels      20 min Therapeutic Activity:  [x]  See flow sheet :discussion of footwear, provided with updated shoe list   Rationale: increase ROM, increase strength, improve coordination, improve balance and increase proprioception  to improve the patients ability to perform daily activities with decreased pain and symptom levels       20 min Neuromuscular Re-education:  [x]  See flow sheet :   Rationale: increase ROM, increase strength, improve coordination, improve balance and increase proprioception  to improve the patients ability to perform daily activities with decreased pain and symptom levels          With   [] TE   [] TA   [] neuro   [] other: Patient Education: [x] Review HEP    [] Progressed/Changed HEP based on:   [] positioning   [] body mechanics   [] transfers   [] heat/ice application    [] other:      Other Objective/Functional Measures:   Gait: left toe out, minimal left toe off     Pain Level (0-10 scale) post treatment: 0    ASSESSMENT/Changes in Function:   Primary complaint remains left lateral knee pain with descending stairs. Improved carryover with eccentric heel downs. No lateral knee pain. Pt reports feels good with increased strengthening activities. Patient will continue to benefit from skilled PT services to modify and progress therapeutic interventions, address functional mobility deficits, address ROM deficits, address strength deficits, analyze and address soft tissue restrictions, analyze and cue movement patterns, analyze and modify body mechanics/ergonomics, assess and modify postural abnormalities, address imbalance/dizziness and instruct in home and community integration to attain remaining goals. [x]  See Plan of Care  []  See progress note/recertification  []  See Discharge Summary         Progress towards goals / Updated goals:  Patient will report compliance with prescribed HEP(s) at least 1x/day in order to assist in maximizing therapeutic gains, reduce symptoms, and to progress towards overall prior function. Pre-Op: HEP issued and reviewed with patient, patient also educated in edema reduction techniques              Post-Op Reassess: Patient states he's been icing and elevating, but hasn't yet performed all of his HEP exercises.  Patient given another copy of his HEP today and was advised to please perform 3x/day to promote further ROM/strength/function.     5/6/22              Last PNt: Patient reports continued daily compliance with his HEP (3x/day).   5/27/22, met     Patient will demonstrate at least 5-100 deg of left knee AROM post-operatively in order to return to prior functional activities and mobility.   Pre-Op AROM: 8-125 deg  Post-Op Reassess:  AROM: 7-87 deg; AAROM: 5-93 deg    5/6/22  Last PN: AROM: 2-120 deg 6/1/22 Met      Long Term Goals:     to be accomplished within 9 weeks:   Patient will score at least 64 points on FOTO in order to maximize function and promote patient satisfaction with overall outcome. (Albino Cayuga is an established functional score where 100 = no disability)  Pre-Op FOTO: 52              Post-Op Reassess: 31   5/6/22              Last PN:6/22/22 47 - slight regression (possibly due to returning to work)     Patient will report less than 2/10 pain during all functional activities and ADLs in order to improve QOL and return to patient's PLOF. Post-Op Reassess: to be completed s/p surgery  Post-Op Reassess:  6-7/10 pain currently, with narcotics      5/6/22              Last PN:  5/10 max pain with descending stairs mostly slow progress 7/1/22     Patient will demonstrate functional and painfree AROM of at least 0-120 deg in order to return to prior functional activities and mobility.   Pre-Op AROM: 8-125 deg  Post-Op Reassess:  AROM: 7-87 deg; AAROM: 5-93 deg      5/6/22              Last PN: : 2-118* BPX, 8-306*  EBUU session progressing 7/1/22              Current: Maintaining above 7/6/22     Patient will demonstrate at least 5/5 strength bilaterally in order to be able to safely perform functional activities and demonstrate improved stability and strength.              Pre-Op Strength: Grossly 5/5 bilaterally except bilateral hip flexion 4+/5  Post-Op Reassess:  (+) extensor lag with mild atrophy of left quad, did not formally test other musculature (will at later date once better AROM is achieved)      5/6/22              Last PN:progressing 7/1/22                                      Hip: Flex: 5/5 bilaterally                                      Abd: 3+/5 bilaterally                                      IR: Left: 3+/5 Right: 3+/5                                      ER: Left: 3+/5, Right: 3+/5                          Knee: Flex: Left: 5/5, Right: 5/5                                     Ext: Left: 5/5, Right: 5/5              Current: progressing 7/11/22 per tolerance to exercises, challenged with glut facilitation and appropriately challenged with leg press       Patient will be able to safely ambulate community distances without and normal gait mechanics in order to improve overall mobility and return patient to his or her PLOF. Eval: mildly antalgic, lacking bilateral TKE prior to heel strike (pre-op)  Post-Op Reassess:  With RW: stiff knee gait pattern including impaired heel/toe mechanics, lacking TKE prior to heel strike, decreased knee flexion during push off, and decrease knee flexion during swing, slow gait speed, WBOS     5/6/22  Last PN:   status: pt reporting ability to walk 10 min prior to noticing pain and tightness in left knee/ankle progressing 7/1/22     Patient will be able to safely negotiate a full flight of stairs with a step-through pattern while holding onto at least one handrail in order to return to normal with this functional activity and improve safety on stairs.   Eval: FF step-through pattern (pre-op)  Last PN:  pt reporting continued difficulty with descending stairs progressing 7/1/22   Current: Progressing 7/11/22, most challenged with descending stairs     PLAN  []  Upgrade activities as tolerated     [x]  Continue plan of care  []  Update interventions per flow sheet       []  Discharge due to:_  []  Other:_      Margret Reed PT, DPT 7/11/2022  8:03 AM    Future Appointments   Date Time Provider [place] Mari   7/18/2022  7:45 AM Kelly Hoyta, PT, DPT [facility]   7/25/2022  7:45 AM Kelly Hoyta, PT, DPT [facility]   8/1/2022  8:30 AM Kelly Nina, PT, DPT [facility]   8/3/2022  7:45 AM Kelly Nina, PT, DPT [facility]   8/8/2022  8:30 AM Kelly Nina, PT, DPT [facility]   8/10/2022  7:45 AM Kelly Nina, PT, DPT [facility]   8/15/2022  8:30 AM Kelly Hoyta, PT, DPT [facility]   8/17/2022  7:45 AM Kelly Wood PT, DPT [facility]   8/22/2022  8:30 AM Corky Camarillo PT [facility]   8/24/2022  7:45 AM Kelly Wood PT, DPT [facility]   8/29/2022  8:30 AM Corky Camarillo PT [facility]   8/31/2022  7:45 SPENCER Posey, [facility]

## 2022-07-13 ENCOUNTER — HOSPITAL ENCOUNTER (OUTPATIENT)
Dept: PHYSICAL THERAPY | Age: 72
Discharge: HOME | End: 2022-07-13
Payer: MEDICARE

## 2022-07-13 PROCEDURE — 97530 THERAPEUTIC ACTIVITIES: CPT

## 2022-07-13 PROCEDURE — 97112 NEUROMUSCULAR REEDUCATION: CPT

## 2022-07-13 PROCEDURE — 97110 THERAPEUTIC EXERCISES: CPT

## 2022-07-13 NOTE — PROGRESS NOTES
PT DAILY TREATMENT NOTE    Patient Name: Fabiana Ingram  Date:2022  : 1950  [x]  Patient  Verified  Payor: Daily Pina / Plan: [place] MEDICARE PART A & B / Product Type: Medicare /    In time:7:39 am  Out time:8:33 am  Total Treatment Time (min): 54  Total Timed Codes (min): 54  1:1 Treatment Time (MC/BCBS only): 47   Visit #: 28 of 40    Treatment Dx: Left knee pain [M25.562]    SUBJECTIVE  Pain Level (0-10 scale): 0-2  Any medication changes, allergies to medications, adverse drug reactions, diagnosis change, or new procedure performed?: [x] No    [] Yes (see summary sheet for update)  Subjective functional status/changes:   [] No changes reported  \"It felt better after the exercises last time. \"    OBJECTIVE    15 min Therapeutic Exercise:  [x] See flow sheet :   Rationale: increase ROM, increase strength, improve coordination and increase proprioception to improve the patients ability to perform daily activities with decreased pain and symptom levels      24 min Therapeutic Activity:  [x]  See flow sheet :   Rationale: increase ROM, increase strength, improve coordination and increase proprioception  to improve the patients ability to perform daily activities with decreased pain and symptom levels       15 min Neuromuscular Re-education:  [x]  See flow sheet :   Rationale: increase ROM, increase strength, improve coordination, improve balance and increase proprioception  to improve the patients ability to perform daily activities with decreased pain and symptom levels            With   [] TE   [] TA   [] neuro   [] other: Patient Education: [x] Review HEP    [] Progressed/Changed HEP based on:   [] positioning   [] body mechanics   [] transfers   [] heat/ice application    [] other:      Other Objective/Functional Measures:   LOB with walking knee hugs - able to self correct but challenged with single leg balance      Pain Level (0-10 scale) post treatment: 0    ASSESSMENT/Changes in Function:   Pt tolerated  Session well. Was fatigued after sled push and walking knee hugs. Excellent carryover and improved tolerance to eccentric heel downs at wall. Was able to eliminate all lateral left knee pain with sit to stand with right anterior hip shift and right reach. Most challenged with reciprocally  descending stairs. Patient will continue to benefit from skilled PT services to modify and progress therapeutic interventions, address functional mobility deficits, address ROM deficits, address strength deficits, analyze and address soft tissue restrictions, analyze and cue movement patterns, analyze and modify body mechanics/ergonomics, assess and modify postural abnormalities and instruct in home and community integration to attain remaining goals. [x]  See Plan of Care  []  See progress note/recertification  []  See Discharge Summary         Progress towards goals / Updated goals:  Patient will report compliance with prescribed HEP(s) at least 1x/day in order to assist in maximizing therapeutic gains, reduce symptoms, and to progress towards overall prior function. Pre-Op: HEP issued and reviewed with patient, patient also educated in edema reduction techniques              Post-Op Reassess: Patient states he's been icing and elevating, but hasn't yet performed all of his HEP exercises.  Patient given another copy of his HEP today and was advised to please perform 3x/day to promote further ROM/strength/function.     5/6/22              Last PNt: Patient reports continued daily compliance with his HEP (3x/day).   5/27/22, met     Patient will demonstrate at least 5-100 deg of left knee AROM post-operatively in order to return to prior functional activities and mobility.   Pre-Op AROM: 8-125 deg  Post-Op Reassess:  AROM: 7-87 deg; AAROM: 5-93 deg    5/6/22  Last PN: AROM: 2-120 deg 6/1/22 Met      Long Term Goals:     to be accomplished within 9 weeks:   Patient will score at least 64 points on FOTO in order to maximize function and promote patient satisfaction with overall outcome. (Adelia Mauricio is an established functional score where 100 = no disability)  Pre-Op FOTO: 52              Post-Op Reassess: 31   5/6/22              Last PN:6/22/22 47 - slight regression (possibly due to returning to work)     Patient will report less than 2/10 pain during all functional activities and ADLs in order to improve QOL and return to patient's PLOF. Post-Op Reassess: to be completed s/p surgery  Post-Op Reassess:  6-7/10 pain currently, with narcotics      5/6/22              Last PN:  5/10 max pain with descending stairs mostly slow progress 7/1/22     Patient will demonstrate functional and painfree AROM of at least 0-120 deg in order to return to prior functional activities and mobility.   Pre-Op AROM: 8-125 deg  Post-Op Reassess:  AROM: 7-87 deg; AAROM: 5-93 deg      5/6/22              Last PN: : 2-118* KSW, 3-563*  ZPLC session progressing 7/1/22              Current: Maintaining above 7/6/22     Patient will demonstrate at least 5/5 strength bilaterally in order to be able to safely perform functional activities and demonstrate improved stability and strength.              Pre-Op Strength: Grossly 5/5 bilaterally except bilateral hip flexion 4+/5  Post-Op Reassess:  (+) extensor lag with mild atrophy of left quad, did not formally test other musculature (will at later date once better AROM is achieved)      5/6/22              Last PN:progressing 7/1/22                                      Hip: Flex: 5/5 bilaterally                                      Abd: 3+/5 bilaterally                                      IR: Left: 3+/5 Right: 3+/5                                      ER: Left: 3+/5, Right: 3+/5                          Knee: Flex: Left: 5/5, Right: 5/5                                     Ext: Left: 5/5, Right: 5/5              Current: progressing 7/11/22 per tolerance to exercises, challenged with glut facilitation and appropriately challenged with leg press       Patient will be able to safely ambulate community distances without and normal gait mechanics in order to improve overall mobility and return patient to his or her PLOF. Eval: mildly antalgic, lacking bilateral TKE prior to heel strike (pre-op)  Post-Op Reassess:  With RW: stiff knee gait pattern including impaired heel/toe mechanics, lacking TKE prior to heel strike, decreased knee flexion during push off, and decrease knee flexion during swing, slow gait speed, WBOS     5/6/22  Last PN:   status: pt reporting ability to walk 10 min prior to noticing pain and tightness in left knee/ankle progressing 7/1/22     Patient will be able to safely negotiate a full flight of stairs with a step-through pattern while holding onto at least one handrail in order to return to normal with this functional activity and improve safety on stairs.   Eval: FF step-through pattern (pre-op)  Last PN:  pt reporting continued difficulty with descending stairs progressing 7/1/22              Current: Progressing 7/11/22, most challenged with descending stairs        PLAN  []  Upgrade activities as tolerated     [x]  Continue plan of care  []  Update interventions per flow sheet       []  Discharge due to:_  []  Other:_      Particia Surendra, PT, DPT 7/13/2022  7:44 AM    Future Appointments   Date Time Provider [facility] Mari   7/13/2022  7:45 AM Jose Azul PT, DPT [facility]   7/18/2022  7:45 AM Jose Azul PT, DPT [facility]   7/25/2022  7:45 AM Jose Azul PT, DPT [facility]   8/1/2022  8:30 AM Jose Azul PT, DPT [facility]   8/3/2022  7:45 AM Jose Azul PT, DPT [facility]   8/8/2022  8:30 AM Jose Azul PT, DPT [facility]   8/10/2022  7:45 AM Jose Azul PT, DPT [facility]   8/15/2022  8:30 AM Jose Azul PT, JOSE SURESH [facility]   8/17/2022  7:45 AM Jose Azul PT, JOSE [facility]   8/22/2022  8:30 AM ELIZABETH Ness [facility]   8/24/2022  7:45 AM Ray Terrell PT, JOSE PAIGETBBOYD [facility]   8/29/2022  8:30 AM ELIZABETH Roberson [facility]   8/31/2022  7:45 AM Ray Terrell PT, JOSE SURESH [facility]

## 2022-07-18 ENCOUNTER — HOSPITAL ENCOUNTER (OUTPATIENT)
Dept: PHYSICAL THERAPY | Age: 72
Discharge: HOME | End: 2022-07-18
Payer: MEDICARE

## 2022-07-18 PROCEDURE — 97110 THERAPEUTIC EXERCISES: CPT

## 2022-07-18 PROCEDURE — 97112 NEUROMUSCULAR REEDUCATION: CPT

## 2022-07-18 PROCEDURE — 97530 THERAPEUTIC ACTIVITIES: CPT

## 2022-07-18 NOTE — PROGRESS NOTES
PT DAILY TREATMENT NOTE    Patient Name: Annelise Guthrie  Date:2022  : 1950  [x]  Patient  Verified  Payor: [employer] / Plan: [place] / Product Type: Medicare /    In time:7:39 am  Out time:8:35 am  Total Treatment Time (min): 56  Total Timed Codes (min): 56  1:1 Treatment Time (MC/BCBS only): 64   Visit #: 29 of 40    Treatment Dx: Left knee pain [M25.562]    SUBJECTIVE  Pain Level (0-10 scale): 0-2  Any medication changes, allergies to medications, adverse drug reactions, diagnosis change, or new procedure performed?: [x] No    [] Yes (see summary sheet for update)  Subjective functional status/changes:   [] No changes reported  \"Getting better I think. I think coming down the stairs is getting easier. Still a little numb in places. \"    OBJECTIVE    13 min Therapeutic Exercise:  [x] See flow sheet :   Rationale: increase ROM, increase strength, improve coordination and increase proprioception to improve the patients ability to perform daily activities with decreased pain and symptom levels      20 min Therapeutic Activity:  [x]  See flow sheet :   Rationale: increase ROM, increase strength, improve coordination and increase proprioception  to improve the patients ability to perform daily activities with decreased pain and symptom levels       23 min Neuromuscular Re-education:  [x]  See flow sheet :   Rationale: increase ROM, increase strength, improve coordination, improve balance and increase proprioception  to improve the patients ability to perform daily activities with decreased pain and symptom levels            With   [] TE   [] TA   [] neuro   [] other: Patient Education: [x] Review HEP    [] Progressed/Changed HEP based on:   [] positioning   [] body mechanics   [] transfers   [] heat/ice application    [] other:      Other Objective/Functional Measures:   AROM Left Knee Flexion: 127*  Imbalance noted with turf marching and knee hugs    Continues to walk with left foot turned out    Pain Level (0-10 scale) post treatment: 0    ASSESSMENT/Changes in Function:   Pt reported getting new shoes from list provided. Reported that helps left toe to feel better. Is demonstrating excellent carryover with exercises and is responding well to strengthening interventions. Continued intermittent left lateral knee pain. Patient will continue to benefit from skilled PT services to modify and progress therapeutic interventions, address functional mobility deficits, address ROM deficits, address strength deficits, analyze and address soft tissue restrictions, analyze and cue movement patterns, analyze and modify body mechanics/ergonomics, assess and modify postural abnormalities and instruct in home and community integration to attain remaining goals. [x]  See Plan of Care  []  See progress note/recertification  []  See Discharge Summary         Progress towards goals / Updated goals:  Patient will report compliance with prescribed HEP(s) at least 1x/day in order to assist in maximizing therapeutic gains, reduce symptoms, and to progress towards overall prior function. Pre-Op: HEP issued and reviewed with patient, patient also educated in edema reduction techniques              Post-Op Reassess: Patient states he's been icing and elevating, but hasn't yet performed all of his HEP exercises.  Patient given another copy of his HEP today and was advised to please perform 3x/day to promote further ROM/strength/function.     5/6/22              Last PNt: Patient reports continued daily compliance with his HEP (3x/day).   5/27/22, met     Patient will demonstrate at least 5-100 deg of left knee AROM post-operatively in order to return to prior functional activities and mobility.   Pre-Op AROM: 8-125 deg  Post-Op Reassess:  AROM: 7-87 deg; AAROM: 5-93 deg    5/6/22  Last PN: AROM: 2-120 deg 6/1/22 Met      Long Term Goals:     to be accomplished within 9 weeks:   Patient will score at least 64 points on FOTO in order to maximize function and promote patient satisfaction with overall outcome. (Sissy Cough is an established functional score where 100 = no disability)  Pre-Op FOTO: 52              Post-Op Reassess: 31   5/6/22              Last PN:6/22/22 47 - slight regression (possibly due to returning to work)     Patient will report less than 2/10 pain during all functional activities and ADLs in order to improve QOL and return to patient's PLOF. Post-Op Reassess: to be completed s/p surgery  Post-Op Reassess:  6-7/10 pain currently, with narcotics      5/6/22              Last PN:  5/10 max pain with descending stairs mostly slow progress 7/1/22     Patient will demonstrate functional and painfree AROM of at least 0-120 deg in order to return to prior functional activities and mobility.   Pre-Op AROM: 8-125 deg  Post-Op Reassess:  AROM: 7-87 deg; AAROM: 5-93 deg      5/6/22              Last PN: : 2-118* BQV, 6-107*  AIJJ session progressing 7/1/22              Current: progressing 7/18/22 left knee AROM flexion 127*     Patient will demonstrate at least 5/5 strength bilaterally in order to be able to safely perform functional activities and demonstrate improved stability and strength.              Pre-Op Strength: Grossly 5/5 bilaterally except bilateral hip flexion 4+/5  Post-Op Reassess:  (+) extensor lag with mild atrophy of left quad, did not formally test other musculature (will at later date once better AROM is achieved)      5/6/22              Last PN:progressing 7/1/22                                      Hip: Flex: 5/5 bilaterally                                      Abd: 3+/5 bilaterally                                      IR: Left: 3+/5 Right: 3+/5                                      ER: Left: 3+/5, Right: 3+/5                          Knee: Flex: Left: 5/5, Right: 5/5                                     Ext: Left: 5/5, Right: 5/5              Current: progressing 7/11/22 per tolerance to exercises, challenged with glut facilitation and appropriately challenged with leg press       Patient will be able to safely ambulate community distances without and normal gait mechanics in order to improve overall mobility and return patient to his or her PLOF. Eval: mildly antalgic, lacking bilateral TKE prior to heel strike (pre-op)  Post-Op Reassess:  With RW: stiff knee gait pattern including impaired heel/toe mechanics, lacking TKE prior to heel strike, decreased knee flexion during push off, and decrease knee flexion during swing, slow gait speed, WBOS     5/6/22  Last PN:   status: pt reporting ability to walk 10 min prior to noticing pain and tightness in left knee/ankle progressing 7/1/22     Patient will be able to safely negotiate a full flight of stairs with a step-through pattern while holding onto at least one handrail in order to return to normal with this functional activity and improve safety on stairs.   Eval: FF step-through pattern (pre-op)  Last PN:  pt reporting continued difficulty with descending stairs progressing 7/1/22              Current: Progressing 7/11/22, most challenged with descending stairs        PLAN  []  Upgrade activities as tolerated     [x]  Continue plan of care  []  Update interventions per flow sheet       []  Discharge due to:_  []  Other:_      Reji Romano PT, DPT 7/18/2022  7:49 AM    Future Appointments   Date Time Provider Quincy Mari   7/25/2022  7:45 AM Qaun Kowalski, PT, DPT [facility]   8/1/2022  8:30 AM Quan Kowalski, PT, DPT [facility]   8/3/2022  7:45 AM Quan Lynnt, PT, DPT [facility]   8/8/2022  8:30 AM Quan Lynnt, PT, DPT [facility]   8/10/2022  7:45 AM Quan Meghana, PT, DPT [facility]   8/15/2022  8:30 AM Quan Kowalski, PT, DPT [facility]   8/17/2022  7:45 AM Quan Lynnt, PT, DPT [facility]   8/22/2022  8:30 AM ELIZABETH Mcginnis [facility]   8/24/2022  7:45 AM Quan Kowalski PT, JOSE SHAHHPHOLLI [facility]   8/29/2022  8:30 AM Jaxon Wheeler PT [facility]   8/31/2022  7:45 AM Theresa Lazaro, PT, DPT [facility]

## 2022-07-20 ENCOUNTER — APPOINTMENT (OUTPATIENT)
Dept: PHYSICAL THERAPY | Age: 72
End: 2022-07-20
Payer: MEDICARE

## 2022-07-22 ENCOUNTER — HOSPITAL ENCOUNTER (OUTPATIENT)
Dept: PHYSICAL THERAPY | Age: 72
Discharge: HOME | End: 2022-07-22
Payer: MEDICARE

## 2022-07-22 PROCEDURE — 97530 THERAPEUTIC ACTIVITIES: CPT

## 2022-07-22 PROCEDURE — 97110 THERAPEUTIC EXERCISES: CPT

## 2022-07-22 PROCEDURE — 97112 NEUROMUSCULAR REEDUCATION: CPT

## 2022-07-22 NOTE — PROGRESS NOTES
PT DAILY TREATMENT NOTE    Patient Name: Dean Buitrago  Date:2022  : 1950  [x]  Patient  Verified  Payor: [employer]ill [employer] / Plan: [place] MEDICARE PART A & B / Product Type: Medicare /    In time:9:11 am  Out time:10: 11 am  Total Treatment Time (min): 60  Total Timed Codes (min): 60  1:1 Treatment Time (MC/BCBS only): 45   Visit #: 30 of 40    Treatment Dx: Left knee pain [M25.562]    SUBJECTIVE  Pain Level (0-10 scale): 0  Any medication changes, allergies to medications, adverse drug reactions, diagnosis change, or new procedure performed?: [x] No    [] Yes (see summary sheet for update)  Subjective functional status/changes:   [] No changes reported  \"I think the sock is helping to make my ankle hurt more. I cut my sock and it felt better at work. \"  Reported having lateral left knee pain yesterday evening and night. Thinks might be after coming down the stairs. Reported that was having back pain and standing left posterior hip capsule inhibition helped to decrease pain.      OBJECTIVE    15 min Therapeutic Exercise:  [x] See flow sheet :   Rationale: increase ROM, increase strength, improve coordination, and increase proprioception to improve the patients ability to perform daily activities with decreased pain and symptom levels      20 min Therapeutic Activity:  [x]  See flow sheet :   Rationale: increase ROM, increase strength, improve coordination, and increase proprioception  to improve the patients ability to perform daily activities with decreased pain and symptom levels       25 min Neuromuscular Re-education:  [x]  See flow sheet : included rib mobilizations with breath in 90-90  Included manual assistance with adductor pull back     Rationale: increase ROM, increase strength, improve coordination, and increase proprioception  to improve the patients ability to perform daily activities with decreased pain and symptom levels            With   [] TE   [] TA   [] neuro   [] other: Patient Education: [x] Review HEP    [] Progressed/Changed HEP based on:   [] positioning   [] body mechanics   [] transfers   [] heat/ice application    [] other:      Other Objective/Functional Measures:   Required manual assistance to help with posterior left hip shift     Pain Level (0-10 scale) post treatment: 0    ASSESSMENT/Changes in Function:   Pt tolerated session well. Challenged with progression of bridges to elevated bridges. Continues to be most limited with descending stairs. Patient will continue to benefit from skilled PT services to modify and progress therapeutic interventions, address functional mobility deficits, address ROM deficits, address strength deficits, analyze and address soft tissue restrictions, analyze and cue movement patterns, analyze and modify body mechanics/ergonomics, assess and modify postural abnormalities, and instruct in home and community integration to attain remaining goals. [x]  See Plan of Care  []  See progress note/recertification  []  See Discharge Summary         Progress towards goals / Updated goals:  Patient will report compliance with prescribed HEP(s) at least 1x/day in order to assist in maximizing therapeutic gains, reduce symptoms, and to progress towards overall prior function. Pre-Op: HEP issued and reviewed with patient, patient also educated in edema reduction techniques              Post-Op Reassess: Patient states he's been icing and elevating, but hasn't yet performed all of his HEP exercises. Patient given another copy of his HEP today and was advised to please perform 3x/day to promote further ROM/strength/function. 5/6/22              Last PNt: Patient reports continued daily compliance with his HEP (3x/day). 5/27/22, met     Patient will demonstrate at least 5-100 deg of left knee AROM post-operatively in order to return to prior functional activities and mobility.   Pre-Op AROM: 8-125 deg  Post-Op Reassess:  AROM: 7-87 deg; AAROM: 5-93 deg    5/6/22  Last PN: AROM: 2-120 deg 6/1/22 Met      Long Term Goals:     to be accomplished within 9 weeks:   Patient will score at least 64 points on FOTO in order to maximize function and promote patient satisfaction with overall outcome. (Rosezena Dwaine is an established functional score where 100 = no disability)  Pre-Op FOTO: 52              Post-Op Reassess: 31   5/6/22              Last PN:6/22/22 47 - slight regression (possibly due to returning to work)     Patient will report less than 2/10 pain during all functional activities and ADLs in order to improve QOL and return to patient's PLOF. Post-Op Reassess: to be completed s/p surgery  Post-Op Reassess:  6-7/10 pain currently, with narcotics      5/6/22              Last PN:  5/10 max pain with descending stairs mostly slow progress 7/1/22     Patient will demonstrate functional and painfree AROM of at least 0-120 deg in order to return to prior functional activities and mobility. Pre-Op AROM: 8-125 deg  Post-Op Reassess:  AROM: 7-87 deg; AAROM: 5-93 deg      5/6/22              Last PN: : 2-118* pre, 1-128*  Post session progressing 7/1/22              Current: progressing 7/18/22 left knee AROM flexion 127*     Patient will demonstrate at least 5/5 strength bilaterally in order to be able to safely perform functional activities and demonstrate improved stability and strength.               Pre-Op Strength: Grossly 5/5 bilaterally except bilateral hip flexion 4+/5  Post-Op Reassess:  (+) extensor lag with mild atrophy of left quad, did not formally test other musculature (will at later date once better AROM is achieved)      5/6/22              Last PN:progressing 7/1/22                                      Hip: Flex: 5/5 bilaterally                                      Abd: 3+/5 bilaterally                                      IR: Left: 3+/5 Right: 3+/5                                      ER: Left: 3+/5, Right: 3+/5                          Knee: Flex: Left: 5/5, Right: 5/5                                     Ext: Left: 5/5, Right: 5/5              Current: progressing 7/22/22 per tolerance to exercises,very challenged with elevated bridges      Patient will be able to safely ambulate community distances without and normal gait mechanics in order to improve overall mobility and return patient to his or her PLOF. Eval: mildly antalgic, lacking bilateral TKE prior to heel strike (pre-op)  Post-Op Reassess:  With RW: stiff knee gait pattern including impaired heel/toe mechanics, lacking TKE prior to heel strike, decreased knee flexion during push off, and decrease knee flexion during swing, slow gait speed, WBOS     5/6/22  Last PN:   status: pt reporting ability to walk 10 min prior to noticing pain and tightness in left knee/ankle progressing 7/1/22     Patient will be able to safely negotiate a full flight of stairs with a step-through pattern while holding onto at least one handrail in order to return to normal with this functional activity and improve safety on stairs.   Eval: FF step-through pattern (pre-op)  Last PN:  pt reporting continued difficulty with descending stairs progressing 7/1/22              Current: Progressing 7/11/22, most challenged with descending stairs     PLAN  []  Upgrade activities as tolerated     [x]  Continue plan of care  []  Update interventions per flow sheet       []  Discharge due to:_  []  Other:_      Myles Agosto PT, DPT 7/22/2022  9:32 AM    Future Appointments   Date Time Provider Quincy Mari   7/25/2022  7:45 AM Jose Azul PT, DPT [facility]   8/1/2022  8:30 AM Jose Azul PT, DPT [facility]   8/3/2022  7:45 AM Jose Azul PT, DPT [facility]   8/8/2022  8:30 AM Jose Azul PT, DPT [facility]   8/10/2022  7:45 AM Jose Azul PT, DPT [facility]   8/15/2022  8:30 AM [facility]ul PT[facility]   8/17/2022  7:45 AM Jose Azul PT, JOSE SURESH [facility]   8/22/2022  8:30 AM Nessa Reyes PT [facility]   8/24/2022  7:45 AM Hannah Chase PT, DPT [facility]   8/29/2022  8:30 AM ELIZABETH Rodríguez [facility]   8/31/2022  7:45 AM Hannah Chase PT, DPT [facility]

## 2022-07-25 ENCOUNTER — HOSPITAL ENCOUNTER (OUTPATIENT)
Dept: PHYSICAL THERAPY | Age: 72
Discharge: HOME | End: 2022-07-25
Payer: MEDICARE

## 2022-07-25 PROCEDURE — 97110 THERAPEUTIC EXERCISES: CPT

## 2022-07-25 PROCEDURE — 97112 NEUROMUSCULAR REEDUCATION: CPT

## 2022-07-25 PROCEDURE — 97530 THERAPEUTIC ACTIVITIES: CPT

## 2022-07-25 NOTE — PROGRESS NOTES
PT DAILY TREATMENT NOTE    Patient Name: Tabatha Meng  Date:2022  : 1950  [x]  Patient  Verified  Payor: Yuri Adame / Plan: [place] / Product Type: Medicare /    In time:7:44 am  Out time:8:35 am   Total Treatment Time (min): 51  Total Timed Codes (min): 51  1:1 Treatment Time (MC/BCBS only): 51   Visit #: 31 of 40    Treatment Dx: Left knee pain [M25.562]    SUBJECTIVE  Pain Level (0-10 scale): 0  Any medication changes, allergies to medications, adverse drug reactions, diagnosis change, or new procedure performed?: [x] No    [] Yes (see summary sheet for update)  Subjective functional status/changes:   [] No changes reported  \"Getting better I think. It (lateral knee pain) is still there and I am aware but getting better. \"    OBJECTIVE    15 min Therapeutic Exercise:  [x] See flow sheet :   Rationale: increase ROM, increase strength, improve coordination, and increase proprioception to improve the patients ability to perform daily activities with decreased pain and symptom levels      20 min Therapeutic Activity:  [x]  See flow sheet :   Rationale: increase ROM, increase strength, improve coordination, and increase proprioception  to improve the patients ability to perform daily activities with decreased pain and symptom levels       16 min Neuromuscular Re-education:  [x]  See flow sheet :   Rationale: increase ROM, increase strength, improve coordination, and increase proprioception  to improve the patients ability to perform daily activities with decreased pain and symptom levels      With   [] TE   [] TA   [] neuro   [] other: Patient Education: [x] Review HEP    [] Progressed/Changed HEP based on:   [] positioning   [] body mechanics   [] transfers   [] heat/ice application    [] other:      Other Objective/Functional Measures:   3 LOB with march with reach  Noted posterior lean with march with reach and increased toe out bilaterally  Improved foot strike following retro walking with alternating reach      Pain Level (0-10 scale) post treatment: 0    ASSESSMENT/Changes in Function:   Pt tolerated session well. Good hamstring facilitation with elevated bridges. Was able to tolerate progressions in weights without pain. Reports decreased lateral knee pain with descending stairs and overall decreased posterior tib pain - present intermittently at night. Patient will continue to benefit from skilled PT services to modify and progress therapeutic interventions, address functional mobility deficits, address ROM deficits, address strength deficits, analyze and address soft tissue restrictions, analyze and cue movement patterns, analyze and modify body mechanics/ergonomics, assess and modify postural abnormalities, and instruct in home and community integration to attain remaining goals. [x]  See Plan of Care  []  See progress note/recertification  []  See Discharge Summary         Progress towards goals / Updated goals:  Patient will report compliance with prescribed HEP(s) at least 1x/day in order to assist in maximizing therapeutic gains, reduce symptoms, and to progress towards overall prior function. Pre-Op: HEP issued and reviewed with patient, patient also educated in edema reduction techniques              Post-Op Reassess: Patient states he's been icing and elevating, but hasn't yet performed all of his HEP exercises. Patient given another copy of his HEP today and was advised to please perform 3x/day to promote further ROM/strength/function. 5/6/22              Last PNt: Patient reports continued daily compliance with his HEP (3x/day). 5/27/22, met     Patient will demonstrate at least 5-100 deg of left knee AROM post-operatively in order to return to prior functional activities and mobility.   Pre-Op AROM: 8-125 deg  Post-Op Reassess:  AROM: 7-87 deg; AAROM: 5-93 deg    5/6/22  Last PN: AROM: 2-120 deg 6/1/22 Met      Long Term Goals:     to be accomplished within 9 weeks:   Patient will score at least 64 points on FOTO in order to maximize function and promote patient satisfaction with overall outcome. ([address] is an established functional score where 100 = no disability)  Pre-Op FOTO: 52              Post-Op Reassess: 31   5/6/22              Last PN:6/22/22 47 - slight regression (possibly due to returning to work)     Patient will report less than 2/10 pain during all functional activities and ADLs in order to improve QOL and return to patient's PLOF. Post-Op Reassess: to be completed s/p surgery  Post-Op Reassess:  6-7/10 pain currently, with narcotics      5/6/22              Last PN:  5/10 max pain with descending stairs mostly slow progress 7/1/22   Current: Progressing 7/25/22 pain about 2-3/10 with descending stairs, has become more intermittent. Patient will demonstrate functional and painfree AROM of at least 0-120 deg in order to return to prior functional activities and mobility. Pre-Op AROM: 8-125 deg  Post-Op Reassess:  AROM: 7-87 deg; AAROM: 5-93 deg      5/6/22              Last PN: : 2-118* pre, 1-128*  Post session progressing 7/1/22              Current: progressing 7/18/22 left knee AROM flexion 127*     Patient will demonstrate at least 5/5 strength bilaterally in order to be able to safely perform functional activities and demonstrate improved stability and strength.               Pre-Op Strength: Grossly 5/5 bilaterally except bilateral hip flexion 4+/5  Post-Op Reassess:  (+) extensor lag with mild atrophy of left quad, did not formally test other musculature (will at later date once better AROM is achieved)      5/6/22              Last PN:progressing 7/1/22                                      Hip: Flex: 5/5 bilaterally                                      Abd: 3+/5 bilaterally                                      IR: Left: 3+/5 Right: 3+/5                                      ER: Left: 3+/5, Right: 3+/5                          Knee: Flex: Left: 5/5, Right: 5/5                                     Ext: Left: 5/5, Right: 5/5              Current: progressing 7/22/22 per tolerance to exercises,very challenged with elevated bridges      Patient will be able to safely ambulate community distances without and normal gait mechanics in order to improve overall mobility and return patient to his or her PLOF. Eval: mildly antalgic, lacking bilateral TKE prior to heel strike (pre-op)  Post-Op Reassess:  With RW: stiff knee gait pattern including impaired heel/toe mechanics, lacking TKE prior to heel strike, decreased knee flexion during push off, and decrease knee flexion during swing, slow gait speed, WBOS     5/6/22  Last PN:   status: pt reporting ability to walk 10 min prior to noticing pain and tightness in left knee/ankle progressing 7/1/22     Patient will be able to safely negotiate a full flight of stairs with a step-through pattern while holding onto at least one handrail in order to return to normal with this functional activity and improve safety on stairs.   Eval: FF step-through pattern (pre-op)  Last PN:  pt reporting continued difficulty with descending stairs progressing 7/1/22              Current: Progressing 7/11/22, most challenged with descending stairs     PLAN  []  Upgrade activities as tolerated     [x]  Continue plan of care  []  Update interventions per flow sheet       []  Discharge due to:_  []  Other:_      Reji Mao PT, DPT 7/25/2022  7:57 AM    Future Appointments   Date Time Provider [place]   8/1/2022  8:30 AM Mile Rich PT, DPT [facility]   8/3/2022  7:45 AM Mile Rich PT, DPT [facility]   8/8/2022  8:30 AM Mile Rich PT, DPT [facility]   8/10/2022  7:45 AM Mile Rich PT, DPT [facility]   8/15/2022  8:30 AM Mile Rich PT, DPT [facility]   8/17/2022  7:45 AM Mile Rich PT, [facility]   8/22/2022  8:30 AM ELIZABETH Vines [facility]   8/24/2022  7:45 AM Mile Rich PT, DPT [facility]   8/29/2022  8:30 AM Phill Cogan[facility]   8/31/2022  7:45 AM Keegan Bettencourt, PT, DPT [facility]

## 2022-08-01 ENCOUNTER — HOSPITAL ENCOUNTER (OUTPATIENT)
Dept: PHYSICAL THERAPY | Age: 72
Discharge: HOME | End: 2022-08-01
Payer: MEDICARE

## 2022-08-01 PROCEDURE — 97110 THERAPEUTIC EXERCISES: CPT

## 2022-08-01 PROCEDURE — 97016 VASOPNEUMATIC DEVICE THERAPY: CPT

## 2022-08-01 PROCEDURE — 97530 THERAPEUTIC ACTIVITIES: CPT

## 2022-08-01 PROCEDURE — 97112 NEUROMUSCULAR REEDUCATION: CPT

## 2022-08-01 NOTE — PROGRESS NOTES
[facility] at the [address], [place], [address] [facility]  Phone: [phone]      Fax:  [phone]    Progress Note    Patient name: Noa Saxena Start of Care: 22   Referral source: Cecy Marie MD : 1950   Medical/Treatment Diagnosis: Left knee pain [M25.562] Onset Date:2022      Prior Hospitalization: see medical history Provider#: [id number]   Medications: Verified on Patient Summary List     Comorbidities: s/p right TKR in , cataract surgery, facial cyst removal, prostate issues (no cancer), hypertension, high cholesterol, sleep apnea  Prior Level of Function:   Unrestricted with functional activities and ADLs  [x] No assistive device  [x] active lifestyle, [] moderately active lifestyle, [] sedentary lifestyle     Visits from Start of Care: 32    Missed Visits: 2      Progress towards goals / Updated goals:  Patient will report compliance with prescribed HEP(s) at least 1x/day in order to assist in maximizing therapeutic gains, reduce symptoms, and to progress towards overall prior function. Pre-Op: HEP issued and reviewed with patient, patient also educated in edema reduction techniques              Post-Op Reassess: Patient states he's been icing and elevating, but hasn't yet performed all of his HEP exercises. Patient given another copy of his HEP today and was advised to please perform 3x/day to promote further ROM/strength/function. 22              Last PN: Patient reports continued daily compliance with his HEP (3x/day). 22, met     Patient will demonstrate at least 5-100 deg of left knee AROM post-operatively in order to return to prior functional activities and mobility.   Pre-Op AROM: 8-125 deg  Post-Op Reassess:  AROM: 7-87 deg; AAROM: 5-93 deg    22  Last PN: AROM: 2-120 deg 22 Met      Long Term Goals:     to be accomplished within 9 weeks:   Patient will score at least 64 points on FOTO in order to maximize function and promote patient satisfaction with overall outcome. ([address] is an established functional score where 100 = no disability)  Pre-Op FOTO: 52              Post-Op Reassess: 31   5/6/22              Last PN:6/22/22 47 - slight regression (possibly due to returning to work)  Current: 8/1/22 [id number] to be met in 4 more weeks     Patient will report less than 2/10 pain during all functional activities and ADLs in order to improve QOL and return to patient's PLOF. Post-Op Reassess: to be completed s/p surgery  Post-Op Reassess:  6-7/10 pain currently, with narcotics      5/6/22              Last PN:  5/10 max pain with descending stairs mostly slow progress 7/1/22              Current: 8/1/22 rating worst pain in the last 48 hrs 7/10-slow progression-goal to be met in 4 more weeks     Patient will demonstrate functional and painfree AROM of at least 0-120 deg in order to return to prior functional activities and mobility. Pre-Op AROM: 8-125 deg  Post-Op Reassess:  AROM: 7-87 deg; AAROM: 5-93 deg      5/6/22              Last PN: : 2-118* pre, 1-128*  Post session progressing 7/1/22              Current: 8/1/22 left knee AROM flexion 128 degrees after manual; knee extension: lacking 2 after manual-part'l MET-goal to be met in 4 more weeks     Patient will demonstrate at least 5/5 strength bilaterally in order to be able to safely perform functional activities and demonstrate improved stability and strength.               Pre-Op Strength: Grossly 5/5 bilaterally except bilateral hip flexion 4+/5  Post-Op Reassess:  (+) extensor lag with mild atrophy of left quad, did not formally test other musculature (will at later date once better AROM is achieved)      5/6/22              Last PN:progressing 7/1/22                                      Hip: Flex: 5/5 bilaterally                                      Abd: 3+/5 bilaterally                                      IR: Left: 3+/5 Right: 3+/5                                      ER: Left: 3+/5, Right: 3+/5                          Knee: Flex: Left: 5/5, Right: 5/5                                     Ext: Left: 5/5, Right: 5/5              Current: 8/1/[id number] to met in 4 more weeks   Hip: Flex: (supine) right: 4/5 left:  4/5                         Abd (sidelying): 4-/5 bilaterally                                      IR (seated): Left: 3+/5 Right: 4-/5                                      ER (seated): Left: 3+/5, Right: 4/5                          Knee: Flex: Left: 5-/5 (pain), Right: 5/5                                     Ext: Left: 5/5, Right: 5/5     Patient will be able to safely ambulate community distances without and normal gait mechanics in order to improve overall mobility and return patient to his or her PLOF. Eval: mildly antalgic, lacking bilateral TKE prior to heel strike (pre-op)  Post-Op Reassess:  With RW: stiff knee gait pattern including impaired heel/toe mechanics, lacking TKE prior to heel strike, decreased knee flexion during push off, and decrease knee flexion during swing, slow gait speed, WBOS     5/6/22  Last PN:   status: pt reporting ability to walk 10 min prior to noticing pain and tightness in left knee/ankle progressing 7/1/22  Current: 8/1/22 Pt walked . 5 miles and had inc in pain, pt continues to report stiffness, pt with dec TKE at heel strike and toe out-progressing-goal to be met in 4 more weeks     Patient will be able to safely negotiate a full flight of stairs with a step-through pattern while holding onto at least one handrail in order to return to normal with this functional activity and improve safety on stairs.   Eval: FF step-through pattern (pre-op)  Last PN:  pt reporting continued difficulty with descending stairs progressing 7/1/22              Current: 8/1/22 pt continues to report pain with descending stairs-goal to be met in 4 more weeks  Key Functional Changes: Pt is a 66 yr old male who presented to therapy with c/c left knee pain s/p left TKR on 5/4/22. Pt has attended 32 sessions including eval on  4/26/22 to address deficits in ROM, strength, gait and functional mobiltiy. This is patients 8th visit since last PN/recert on 9/1/10. Pt has progressed with therapy as pt is able to stand to perform ADLs. Pt also with inc in certain LE MMT and ROM. Although pt has progressed with therapy, pt continues to have increased pain and stiffness, rating worst pain in the last 48 hrs 7/10. Pt complains of stiffness and pain on the lateral knee and even the posterior knee. Pt also continues to complain of swelling and pain with descending stairs. Pt would benefit from continuing with skilled PT to further address impairments. Patient will continue to benefit from skilled PT services to modify and progress therapeutic interventions, address functional mobility deficits, address ROM deficits, address strength deficits, analyze and address soft tissue restrictions, analyze and cue movement patterns, analyze and modify body mechanics/ergonomics, assess and modify postural abnormalities, address imbalance/dizziness, and instruct in home and community integration to attain remaining goals.      Updated Goals: to be achieved in 4 more weeks:   Please see above  ASSESSMENT/RECOMMENDATIONS:  []Continue therapy per initial plan/protocol at a frequency of  2 x per week for 4 more  weeks  []Continue therapy with the following recommended changes:_____________________      _____________________________________________________________________  []Discontinue therapy progressing towards or have reached established goals  []Discontinue therapy due to lack of appreciable progress towards goals  []Discontinue therapy due to lack of attendance or compliance  []Await Physician's recommendations/decisions regarding therapy  []Other:________________________________________________________________    Thank you for this referral.   Leopoldo Median, PT, DPT, CIMT  8/1/2022 8:23 AM

## 2022-08-01 NOTE — PROGRESS NOTES
PT DAILY TREATMENT NOTE    Patient Name: Dean Buitrago  Date:2022  : 1950  [x]  Patient  Verified  Payor: [place] MED[place] / Plan: [place] MEDICARE PART A & B / Product Type: Medicare /    In time:8:33  Out time:9:35  Total Treatment Time (min): 62  Total Timed Codes (min): 52  1:1 Treatment Time (MC/BCBS only): 46   Visit #: 32 of 40    Treatment Dx: Left knee pain [M25.562]    SUBJECTIVE  Pain Level (0-10 scale): stiff  Any medication changes, allergies to medications, adverse drug reactions, diagnosis change, or new procedure performed?: [x] No    [] Yes (see summary sheet for update)  Subjective functional status/changes:   [] No changes reported  Pt reports that he walked Sat/ about a half a mile and it hasn't been the same since. Reports that he didn't do his exercises before walking. Reports that he still has pain with going down stairs. Reports that he does his HEP 3x/week. Reports that he is stretching and leaning on the bathroom counter anytime throughout the day. Reports that he has no problems sleeping. Reports that he is able to stand to shower, shaving, but feels stiffness. Reports that he still has a lot of stiffness in the knee, especially on the outside of the knee. Reports worst pain in the last 48 hrs 7/10, back of the knee, especially with bending the knee. Reports that he gets swelling with driving to work.     OBJECTIVE    Modalities Rationale:     decrease edema, decrease inflammation, and decrease pain to improve patient's ability to perform daily activities with decreased pain and symptom levels     min [] Estim, type/location:                                      []  att     []  unatt     []  w/US     []  w/ice    []  w/heat    min []  Mechanical Traction: type/lbs                   []  pro   []  sup   []  int   []  cont    []  before manual    []  after manual    min []  Ultrasound, settings/location:      min []  Iontophoresis w/ dexamethasone, location: []  take home patch       []  in clinic    min []  Ice     []  Heat    location/position:    10 min [x]  Vasopneumatic Device, press/temp: Left knee, elevated   If using vaso (only need to measure limb vaso being performed on)      pre-treatment girth : 42 cm      post-treatment girth : 41.5 cm      measured at (landmark location) :  mid patella    min []  Other:    [] Skin assessment post-treatment (if applicable):    []  intact    []  redness- no adverse reaction                  []redness - adverse reaction:              15 min Therapeutic Exercise:  [] See flow sheet :   Rationale: increase ROM, increase strength, improve coordination, improve balance, and increase proprioception to improve the patients ability to perform daily activities with decreased pain and symptom levels      12 min Therapeutic Activity:  []  See flow sheet :   Rationale: increase ROM, increase strength, improve coordination, improve balance, and increase proprioception  to improve the patients ability to perform daily activities with decreased pain and symptom levels       20 min Neuromuscular Re-education:  []  See flow sheet :   Rationale: increase ROM, increase strength, improve coordination, improve balance, and increase proprioception  to improve the patients ability to perform daily activities with decreased pain and symptom levels      5  NC min Manual Therapy: At end range of flexion: inf grade III patellar glides, post proximal tibial grade III mobs to inc knee flexion, PROM knee flexion     Rationale: decrease pain, increase ROM, increase tissue extensibility, decrease edema , correct positional vertigo, decrease trigger points, and increase postural awareness to improve the patients ability to perform daily activities with decreased pain and symptom levels    The manual therapy interventions were performed at a separate and distinct time from the therapeutic activities interventions.     With   [x] TE [x] TA   [x] neuro   [] other: Patient Education: [x] Review HEP    [] Progressed/Changed HEP based on:   [x] positioning   [] body mechanics   [] transfers   [] heat/ice application    [x] other: proper gait including performing heel strike, and toe off     Other Objective/Functional Measures: Pt enters gym in no apparent distress. left knee AROM flexion 128 degrees after manual; knee extension: lacking 2 after manual     Hip: Flex: (supine) right: 4/5 left:  4/5                                      Abd (sidelying): 4-/5 bilaterally                                      IR (seated): Left: 3+/5 Right: 4-/5                                      ER (seated): Left: 3+/5, Right: 4/5                          Knee: Flex: Left: 5-/5 (pain), Right: 5/5                                     Ext: Left: 5/5, Right: 5/5    LTR (inches): right: 21 left: 21    Pain Level (0-10 scale) post treatment: 0    ASSESSMENT/Changes in Function: Pt is a 66 yr old male who presented to therapy with c/c left knee pain s/p left TKR on 5/4/22. Pt has attended 32 sessions including eval on  4/26/22 to address deficits in ROM, strength, gait and functional mobiltiy. This is patients 8th visit since last PN/recert on 8/4/30. Pt has progressed with therapy as pt is able to stand to perform ADLs. Pt also with inc in certain LE MMT and ROM. Although pt has progressed with therapy, pt continues to have increased pain and stiffness, rating worst pain in the last 48 hrs 7/10. Pt complains of stiffness and pain on the lateral knee and even the posterior knee. Pt also continues to complain of swelling and pain with descending stairs. Pt would benefit from continuing with skilled PT to further address impairments.      Patient will continue to benefit from skilled PT services to modify and progress therapeutic interventions, address functional mobility deficits, address ROM deficits, address strength deficits, analyze and address soft tissue restrictions, analyze and cue movement patterns, analyze and modify body mechanics/ergonomics, assess and modify postural abnormalities, address imbalance/dizziness, and instruct in home and community integration to attain remaining goals. []  See Plan of Care  [x]  See progress note/recertification  []  See Discharge Summary         Progress towards goals / Updated goals:  Patient will report compliance with prescribed HEP(s) at least 1x/day in order to assist in maximizing therapeutic gains, reduce symptoms, and to progress towards overall prior function. Pre-Op: HEP issued and reviewed with patient, patient also educated in edema reduction techniques              Post-Op Reassess: Patient states he's been icing and elevating, but hasn't yet performed all of his HEP exercises. Patient given another copy of his HEP today and was advised to please perform 3x/day to promote further ROM/strength/function. 5/6/22              Last PN: Patient reports continued daily compliance with his HEP (3x/day). 5/27/22, met     Patient will demonstrate at least 5-100 deg of left knee AROM post-operatively in order to return to prior functional activities and mobility. Pre-Op AROM: 8-125 deg  Post-Op Reassess:  AROM: 7-87 deg; AAROM: 5-93 deg    5/6/22  Last PN: AROM: 2-120 deg 6/1/22 Met      Long Term Goals:     to be accomplished within 9 weeks:   Patient will score at least 64 points on FOTO in order to maximize function and promote patient satisfaction with overall outcome. (Ismael Asim is an established functional score where 100 = no disability)  Pre-Op FOTO: 52              Post-Op Reassess: 31   5/6/22              Last PN:6/22/22 47 - slight regression (possibly due to returning to work)  Current: 8/1/22 [id number] to be met in 4 more weeks     Patient will report less than 2/10 pain during all functional activities and ADLs in order to improve QOL and return to patient's PLOF.   Post-Op Reassess: to be completed s/p surgery  Post-Op Reassess:  6-7/10 pain currently, with narcotics      5/6/22              Last PN:  5/10 max pain with descending stairs mostly slow progress 7/1/22              Current: 8/1/22 rating worst pain in the last 48 hrs 7/10-slow progression-goal to be met in 4 more weeks     Patient will demonstrate functional and painfree AROM of at least 0-120 deg in order to return to prior functional activities and mobility. Pre-Op AROM: 8-125 deg  Post-Op Reassess:  AROM: 7-87 deg; AAROM: 5-93 deg      5/6/22              Last PN: : 2-118* pre, 1-128*  Post session progressing 7/1/22              Current: 8/1/22 left knee AROM flexion 128 degrees after manual; knee extension: lacking 2 after manual-part'l MET-goal to be met in 4 more weeks     Patient will demonstrate at least 5/5 strength bilaterally in order to be able to safely perform functional activities and demonstrate improved stability and strength.               Pre-Op Strength: Grossly 5/5 bilaterally except bilateral hip flexion 4+/5  Post-Op Reassess:  (+) extensor lag with mild atrophy of left quad, did not formally test other musculature (will at later date once better AROM is achieved)      5/6/22              Last PN:progressing 7/1/22                                      Hip: Flex: 5/5 bilaterally                                      Abd: 3+/5 bilaterally                                      IR: Left: 3+/5 Right: 3+/5                                      ER: Left: 3+/5, Right: 3+/5                          Knee: Flex: Left: 5/5, Right: 5/5                                     Ext: Left: 5/5, Right: 5/5              Current: 8/1/2290-[id number] to met in 4 more weeks   Hip: Flex: (supine) right: 4/5 left:  4/5                         Abd (sidelying): 4-/5 bilaterally                                      IR (seated): Left: 3+/5 Right: 4-/5                                      ER (seated): Left: 3+/5, Right: 4/5                          Knee: Flex: Left: 5-/5 (pain), Right: 5/5                                     Ext: Left: 5/5, Right: 5/5     Patient will be able to safely ambulate community distances without and normal gait mechanics in order to improve overall mobility and return patient to his or her PLOF. Eval: mildly antalgic, lacking bilateral TKE prior to heel strike (pre-op)  Post-Op Reassess:  With RW: stiff knee gait pattern including impaired heel/toe mechanics, lacking TKE prior to heel strike, decreased knee flexion during push off, and decrease knee flexion during swing, slow gait speed, WBOS     5/6/22  Last PN:   status: pt reporting ability to walk 10 min prior to noticing pain and tightness in left knee/ankle progressing 7/1/22  Current: 8/1/22 Pt walked . 5 miles and had inc in pain, pt continues to report stiffness, pt with dec TKE at heel strike and toe out-progressing-goal to be met in 4 more weeks     Patient will be able to safely negotiate a full flight of stairs with a step-through pattern while holding onto at least one handrail in order to return to normal with this functional activity and improve safety on stairs.   Eval: FF step-through pattern (pre-op)  Last PN:  pt reporting continued difficulty with descending stairs progressing 7/1/22              Current: 8/1/22 pt continues to report pain with descending stairs-goal to be met in 4 more weeks      PLAN  []  Upgrade activities as tolerated     []  Continue plan of care  []  Update interventions per flow sheet       []  Discharge due to:_  [x]  Other:_  Continue with skilled PT 2x/week for 4 more weeks    Kendal Palomino, PT, DPT, [facility] 8/1/2022  8:15 AM    Future Appointments   Date Time Provider [place] [facility]   8/1/2022  8:30 AM Daniel Cason PT [facility]   8/3/2022  7:45 AM Howard Acosta, PT, DPT [facility]   8/8/2022  8:30 AM Howard Acosta PT, DPT [facility]   8/10/2022  7:45 AM Howard Acosta, PT, DPT [facility]   8/15/2022  8:30 AM Howard Acosta, PT, DPT [facility]   8/17/2022  7:45 AM Shelby Hoffman PT, DPT [facility]   8/22/2022  8:30 AM Alvarado Castro PT [facility]   8/24/2022  7:45 AM Shelby Hoffman PT, DPT [facility]   8/29/2022  8:30 AM Alvarado Castro PT [facility]   8/31/2022  7:45 AM Shelby Hoffman PT, DPT [facility]

## 2022-08-03 ENCOUNTER — HOSPITAL ENCOUNTER (OUTPATIENT)
Dept: PHYSICAL THERAPY | Age: 72
Discharge: HOME | End: 2022-08-03
Payer: MEDICARE

## 2022-08-03 PROCEDURE — 97530 THERAPEUTIC ACTIVITIES: CPT

## 2022-08-03 PROCEDURE — 97140 MANUAL THERAPY 1/> REGIONS: CPT

## 2022-08-03 PROCEDURE — 97110 THERAPEUTIC EXERCISES: CPT

## 2022-08-03 PROCEDURE — 97112 NEUROMUSCULAR REEDUCATION: CPT

## 2022-08-03 PROCEDURE — 97016 VASOPNEUMATIC DEVICE THERAPY: CPT

## 2022-08-03 NOTE — PROGRESS NOTES
PT DAILY TREATMENT NOTE    Patient Name: Brigette Ambrocio  Date:8/3/2022  : 1950  [x]  Patient  Verified  Payor: [place] MEDICARE / Plan: [place] MEDICARE PART A & B / Product Type: Medicare /    In time:7:35 am  Out time:8:50  Total Treatment Time (min): 75  Total Timed Codes (min): 65  1:1 Treatment Time (MC/BCBS only): 60   Visit #: 33 of 40    Treatment Dx: Left knee pain [M25.562]    SUBJECTIVE  Pain Level (0-10 scale): 0  Any medication changes, allergies to medications, adverse drug reactions, diagnosis change, or new procedure performed?: [x] No    [] Yes (see summary sheet for update)  Subjective functional status/changes:   [] No changes reported  \"I can tell I am getting better. Just sore. \"    OBJECTIVE    Modalities Rationale:     decrease inflammation and decrease pain to improve patient's ability to perform daily activities with decreased pain and symptom levels     min [] Estim, type/location:                                      []  att     []  unatt     []  w/US     []  w/ice    []  w/heat    min []  Mechanical Traction: type/lbs                   []  pro   []  sup   []  int   []  cont    []  before manual    []  after manual    min []  Ultrasound, settings/location:      min []  Iontophoresis w/ dexamethasone, location:                                               []  take home patch       []  in clinic    min []  Ice     []  Heat    location/position:    10 min [x]  Vasopneumatic Device, press/temp:  To left knee in supine with LE elevated     If using vaso (only need to measure limb vaso being performed on)      pre-treatment girth :39.9 cm       post-treatment girth : 39.1 cm      measured at (landmark location) :  mid patella     min []  Other:    [x] Skin assessment post-treatment (if applicable):    [x]  intact    []  redness- no adverse reaction                  []redness - adverse reaction:        20 min Therapeutic Exercise:  [x] See flow sheet :   Rationale: increase ROM, increase strength, improve coordination, and increase proprioception to improve the patients ability to perform daily activities with decreased pain and symptom levels      20 min Therapeutic Activity:  [x]  See flow sheet :   Rationale: increase ROM, increase strength, improve coordination, improve balance, and increase proprioception  to improve the patients ability to perform daily activities with decreased pain and symptom levels       25 min Neuromuscular Re-education:  [x]  See flow sheet :   Rationale: increase ROM, increase strength, improve coordination, improve balance, and increase proprioception  to improve the patients ability to perform daily activities with decreased pain and symptom levels    With   [] TE   [] TA   [] neuro   [] other: Patient Education: [x] Review HEP    [] Progressed/Changed HEP based on:   [] positioning   [] body mechanics   [] transfers   [] heat/ice application    [] other:      Other Objective/Functional Measures:   Left Knee AROM Ext: lacing 2*     Pain Level (0-10 scale) post treatment: 0    ASSESSMENT/Changes in Function:   Progressed interventions this session. Challenged with thoracic left step up - noted decreased shin angle. Reports that descending stairs is most challenging, but is improving. Patient will continue to benefit from skilled PT services to modify and progress therapeutic interventions, address functional mobility deficits, address ROM deficits, address strength deficits, analyze and address soft tissue restrictions, analyze and cue movement patterns, analyze and modify body mechanics/ergonomics, assess and modify postural abnormalities, and instruct in home and community integration to attain remaining goals.      [x]  See Plan of Care  []  See progress note/recertification  []  See Discharge Summary         Progress towards goals / Updated goals:     Long Term Goals:     to be accomplished within 9 weeks:   Patient will score at least 64 points on FOTO in order to maximize function and promote patient satisfaction with overall outcome. ([address] is an established functional score where 100 = no disability)  Pre-Op FOTO: 52              Post-Op Reassess: 31   5/6/22              Last PN: 8/1/22 [id number] to be met in 4 more weeks     Patient will report less than 2/10 pain during all functional activities and ADLs in order to improve QOL and return to patient's PLOF. Post-Op Reassess: to be completed s/p surgery  Post-Op Reassess:  6-7/10 pain currently, with narcotics      5/6/22              Last PN: 8/1/22 rating worst pain in the last 48 hrs 7/10-slow progression-goal to be met in 4 more weeks     Patient will demonstrate functional and painfree AROM of at least 0-120 deg in order to return to prior functional activities and mobility. Pre-Op AROM: 8-125 deg  Post-Op Reassess:  AROM: 7-87 deg; AAROM: 5-93 deg      5/6/22              Last PN: 8/1/22 left knee AROM flexion 128 degrees after manual; knee extension: lacking 2 after manual-part'l MET-goal to be met in 4 more weeks   Current: maintaining above progress 8/3/22 Left Knee AROM Ext: lacing 2*     Patient will demonstrate at least 5/5 strength bilaterally in order to be able to safely perform functional activities and demonstrate improved stability and strength.               Pre-Op Strength: Grossly 5/5 bilaterally except bilateral hip flexion 4+/5  Post-Op Reassess:  (+) extensor lag with mild atrophy of left quad, did not formally test other musculature (will at later date once better AROM is achieved)      5/6/22              Last PN:8/1/2217-[id number] to met in 4 more weeks  Hip: Flex: (supine) right: 4/5 left:  4/5                         Abd (sidelying): 4-/5 bilaterally                                      IR (seated): Left: 3+/5 Right: 4-/5                                      ER (seated): Left: 3+/5, Right: 4/5                          Knee: Flex: Left: 5-/5 (pain), Right: 5/5 Ext: Left: 5/5, Right: 5/5     Patient will be able to safely ambulate community distances without and normal gait mechanics in order to improve overall mobility and return patient to his or her PLOF. Eval: mildly antalgic, lacking bilateral TKE prior to heel strike (pre-op)  Post-Op Reassess:  With RW: stiff knee gait pattern including impaired heel/toe mechanics, lacking TKE prior to heel strike, decreased knee flexion during push off, and decrease knee flexion during swing, slow gait speed, WBOS     5/6/22  Last PN: 8/1/22 Pt walked . 5 miles and had inc in pain, pt continues to report stiffness, pt with dec TKE at heel strike and toe out-progressing-goal to be met in 4 more weeks     Patient will be able to safely negotiate a full flight of stairs with a step-through pattern while holding onto at least one handrail in order to return to normal with this functional activity and improve safety on stairs.   Eval: FF step-through pattern (pre-op)  Last PN: 8/1/22 pt continues to report pain with descending stairs-goal to be met in 4 more weeks    PLAN  []  Upgrade activities as tolerated     [x]  Continue plan of care  []  Update interventions per flow sheet       []  Discharge due to:_  []  Other:_      Kim Benz, PT, DPT 8/3/2022  7:57 AM    Future Appointments   Date Time Provider [facility] [facility]   8/8/2022  8:30 AM Phuc Tang PT, DPT [facility]   8/10/2022  7:45 AM Phuc Tang PT, DPT [facility]   8/15/2022  8:30 AM Phuc Tang PT, DPT [facility]   8/17/2022  7:45 AM Phuc Tang PT, DPT [facility]   8/22/2022  8:30 AM Chris Park PT [facility]   8/24/2022  7:45 AM Phuc Tang PT, DPT [facility]   8/29/2022  8:30 AM Chris Park PT [facility]   8/31/2022  7:45 AM Phuc Tang PT, DPT [facility]

## 2022-08-08 ENCOUNTER — HOSPITAL ENCOUNTER (OUTPATIENT)
Dept: PHYSICAL THERAPY | Age: 72
Discharge: HOME | End: 2022-08-08
Payer: MEDICARE

## 2022-08-08 PROCEDURE — 97112 NEUROMUSCULAR REEDUCATION: CPT

## 2022-08-08 PROCEDURE — 97530 THERAPEUTIC ACTIVITIES: CPT

## 2022-08-08 PROCEDURE — 97110 THERAPEUTIC EXERCISES: CPT

## 2022-08-08 PROCEDURE — 97016 VASOPNEUMATIC DEVICE THERAPY: CPT

## 2022-08-08 NOTE — PROGRESS NOTES
PT DAILY TREATMENT NOTE    Patient Name: Annie Ernst  Date:2022  : 1950  [x]  Patient  Verified  Payor: Silvana Hazard / Plan: [place] / Product Type: Medicare /    In time:7:33 am  Out time:8:45 am  Total Treatment Time (min): 72  Total Timed Codes (min): 62  1:1 Treatment Time (MC/BCBS only): 50   Visit #: 34 of 40    Treatment Dx: Left knee pain [M25.562]    SUBJECTIVE  Pain Level (0-10 scale): 0  Any medication changes, allergies to medications, adverse drug reactions, diagnosis change, or new procedure performed?: [x] No    [] Yes (see summary sheet for update)  Subjective functional status/changes:   [] No changes reported  \"Good. I came down the stairs this morning normally. \"    OBJECTIVE    Modalities Rationale:     decrease inflammation and decrease pain to improve patient's ability to perform daily activities with decreased pain and symptom levels     min [] Estim, type/location:                                      []  att     []  unatt     []  w/US     []  w/ice    []  w/heat    min []  Mechanical Traction: type/lbs                   []  pro   []  sup   []  int   []  cont    []  before manual    []  after manual    min []  Ultrasound, settings/location:      min []  Iontophoresis w/ dexamethasone, location:                                               []  take home patch       []  in clinic    min []  Ice     []  Heat    location/position:    10 min [x]  Vasopneumatic Device, press/temp:  To left knee in supine with LE elevated     If using vaso (only need to measure limb vaso being performed on)      pre-treatment girth : 39.7 cm      post-treatment girth : 38.9 cm      measured at (landmark location) :  mid patella     min []  Other:    [x] Skin assessment post-treatment (if applicable):    [x]  intact    []  redness- no adverse reaction                  []redness - adverse reaction:        20 min Therapeutic Exercise:  [x] See flow sheet :   Rationale: increase ROM, increase strength, improve coordination, improve balance, and increase proprioception to improve the patients ability to perform daily activities with decreased pain and symptom levels      22 min Therapeutic Activity:  [x]  See flow sheet :   Rationale: increase ROM, increase strength, improve coordination, improve balance, and increase proprioception  to improve the patients ability to perform daily activities with decreased pain and symptom levels       20 min Neuromuscular Re-education:  [x]  See flow sheet :   Rationale: increase ROM, increase strength, improve coordination, improve balance, and increase proprioception  to improve the patients ability to perform daily activities with decreased pain and symptom levels      With   [] TE   [] TA   [] neuro   [] other: Patient Education: [x] Review HEP    [] Progressed/Changed HEP based on:   [] positioning   [] body mechanics   [] transfers   [] heat/ice application    [] other:      Other Objective/Functional Measures:    Left Knee AROM: 0-127*    Pain Level (0-10 scale) post treatment: 0    ASSESSMENT/Changes in Function:   Pt tolerated session well. Required max cues for left adductor pull back, utilized towel under right knee to inhibit right adductor. Excellent carryover with heel downs at wall. Continued decreased left toe off. Patient will continue to benefit from skilled PT services to modify and progress therapeutic interventions, address functional mobility deficits, address ROM deficits, address strength deficits, analyze and address soft tissue restrictions, analyze and cue movement patterns, analyze and modify body mechanics/ergonomics, assess and modify postural abnormalities, and instruct in home and community integration to attain remaining goals.      [x]  See Plan of Care  []  See progress note/recertification  []  See Discharge Summary         Progress towards goals / Updated goals:  Long Term Goals:     to be accomplished within 9 weeks:   Patient will score at least 64 points on FOTO in order to maximize function and promote patient satisfaction with overall outcome. (Stevphen Deist is an established functional score where 100 = no disability)  Pre-Op FOTO: 52              Post-Op Reassess: 31   5/6/22              Last PN: 8/1/22 [id number] to be met in 4 more weeks     Patient will report less than 2/10 pain during all functional activities and ADLs in order to improve QOL and return to patient's PLOF. Post-Op Reassess: to be completed s/p surgery  Post-Op Reassess:  6-7/10 pain currently, with narcotics      5/6/22              Last PN: 8/1/22 rating worst pain in the last 48 hrs 7/10-slow progression-goal to be met in 4 more weeks     Patient will demonstrate functional and painfree AROM of at least 0-120 deg in order to return to prior functional activities and mobility. Pre-Op AROM: 8-125 deg  Post-Op Reassess:  AROM: 7-87 deg; AAROM: 5-93 deg      5/6/22              Last PN: 8/1/22 left knee AROM flexion 128 degrees after manual; knee extension: lacking 2 after manual-part'l MET-goal to be met in 4 more weeks              Current: MET 8/8/22 Left Knee AROM: 0-127*     Patient will demonstrate at least 5/5 strength bilaterally in order to be able to safely perform functional activities and demonstrate improved stability and strength.               Pre-Op Strength: Grossly 5/5 bilaterally except bilateral hip flexion 4+/5  Post-Op Reassess:  (+) extensor lag with mild atrophy of left quad, did not formally test other musculature (will at later date once better AROM is achieved)      5/6/22              Last PN:8/1/[id number] to met in 4 more weeks  Hip: Flex: (supine) right: 4/5 left:  4/5                         Abd (sidelying): 4-/5 bilaterally                                      IR (seated): Left: 3+/5 Right: 4-/5                                      ER (seated): Left: 3+/5, Right: 4/5                          Knee: Flex: Left: 5-/5 (pain), Right: 5/5                                     Ext: Left: 5/5, Right: 5/5     Patient will be able to safely ambulate community distances without and normal gait mechanics in order to improve overall mobility and return patient to his or her PLOF. Eval: mildly antalgic, lacking bilateral TKE prior to heel strike (pre-op)  Post-Op Reassess:  With RW: stiff knee gait pattern including impaired heel/toe mechanics, lacking TKE prior to heel strike, decreased knee flexion during push off, and decrease knee flexion during swing, slow gait speed, WBOS     5/6/22  Last PN: 8/1/22 Pt walked . 5 miles and had inc in pain, pt continues to report stiffness, pt with dec TKE at heel strike and toe out-progressing-goal to be met in 4 more weeks     Patient will be able to safely negotiate a full flight of stairs with a step-through pattern while holding onto at least one handrail in order to return to normal with this functional activity and improve safety on stairs.   Eval: FF step-through pattern (pre-op)  Last PN: 8/1/22 pt continues to report pain with descending stairs-goal to be met in 4 more weeks  Current: Progressing 8/8/22 pt reported descending stairs this morning reciprocally    PLAN  []  Upgrade activities as tolerated     [x]  Continue plan of care  []  Update interventions per flow sheet       []  Discharge due to:_  []  Other:_      García Lyman PT, DPT 8/8/2022  7:58 AM    Future Appointments   Date Time Provider [facility]   8/8/2022  8:30 AM Alden Homans, PT, DPT [facility]   8/10/2022  7:45 AM Alden Homans, PT, DPT [facility]   8/15/2022  8:30 AM Alden Homans, PT, DPT [facility]   8/17/2022  7:45 AM Alden Homans, PT, DPT [facility]   8/22/2022  8:30 AM Jennifer Lawton PT [facility]   8/24/2022  7:45 AM Alden Homans, PT, DPT [facility]   8/29/2022  8:30 AM ELIZABETH Trent [facility]   8/31/2022  7:45 AM Alden Homans, PT, DPT [facility]

## 2022-08-10 ENCOUNTER — HOSPITAL ENCOUNTER (OUTPATIENT)
Dept: PHYSICAL THERAPY | Age: 72
Discharge: HOME | End: 2022-08-10
Payer: MEDICARE

## 2022-08-10 PROCEDURE — 97110 THERAPEUTIC EXERCISES: CPT

## 2022-08-10 PROCEDURE — 97530 THERAPEUTIC ACTIVITIES: CPT

## 2022-08-10 PROCEDURE — 97016 VASOPNEUMATIC DEVICE THERAPY: CPT

## 2022-08-10 PROCEDURE — 97112 NEUROMUSCULAR REEDUCATION: CPT

## 2022-08-10 NOTE — PROGRESS NOTES
PT DAILY TREATMENT NOTE    Patient Name: Dean Buitrago  Date:8/10/2022  : 1950  [x]  Patient  Verified  Payor: [place] MEDIC[facility] / Plan: [place] MEDICARE PART A & B / Product Type: Medicare /    In time:7:33 am  Out time:8:50 am  Total Treatment Time (min): 77  Total Timed Codes (min): 67  1:1 Treatment Time (MC/BCBS only): 60   Visit #: 35 of 40    Treatment Dx: Left knee pain [M25.562]    SUBJECTIVE  Pain Level (0-10 scale): 0  Any medication changes, allergies to medications, adverse drug reactions, diagnosis change, or new procedure performed?: [x] No    [] Yes (see summary sheet for update)  Subjective functional status/changes:   [] No changes reported  \"I am feeling good today. I walked about a half mile last night. \"  Reported feeling as though knee had decreased swelling at end of the day. OBJECTIVE    Modalities Rationale:     decrease inflammation and decrease pain to improve patient's ability to perform daily activities with decreased pain and symptom levels     min [] Estim, type/location:                                      []  att     []  unatt     []  w/US     []  w/ice    []  w/heat    min []  Mechanical Traction: type/lbs                   []  pro   []  sup   []  int   []  cont    []  before manual    []  after manual    min []  Ultrasound, settings/location:      min []  Iontophoresis w/ dexamethasone, location:                                               []  take home patch       []  in clinic    min []  Ice     []  Heat    location/position:    10 min [x]  Vasopneumatic Device, press/temp:  To left knee in supine with LE elevated     If using vaso (only need to measure limb vaso being performed on)      pre-treatment girth : 40.4 cm      post-treatment girth : 40.0 cm      measured at (landmark location) :  mid patella     min []  Other:    [x] Skin assessment post-treatment (if applicable):    [x]  intact    []  redness- no adverse reaction                  []redness - adverse reaction: 17 min Therapeutic Exercise:  [x] See flow sheet :   Rationale: increase ROM, increase strength, improve coordination, improve balance, and increase proprioception to improve the patients ability to perform daily activities with decreased pain and symptom levels      27 min Therapeutic Activity:  [x]  See flow sheet :   Rationale: increase ROM, increase strength, improve coordination, improve balance, and increase proprioception  to improve the patients ability to perform daily activities with decreased pain and symptom levels       23 min Neuromuscular Re-education:  [x]  See flow sheet :   Rationale: increase ROM, increase strength, improve coordination, improve balance, and increase proprioception  to improve the patients ability to perform daily activities with decreased pain and symptom levels          With   [] TE   [] TA   [] neuro   [] other: Patient Education: [x] Review HEP    [] Progressed/Changed HEP based on:   [] positioning   [] body mechanics   [] transfers   [] heat/ice application    [] other:      Other Objective/Functional Measures:   Left Knee AROM 0-127*     Pain Level (0-10 scale) post treatment: 0    ASSESSMENT/Changes in Function:   Pt demonstrated excellent carryover with retro eccentric heel downs. Added squat rack eccentric step ups with good tolerance. Pt reported going for a walk last night without pain and no difficulty descending stairs this morning. Pt is going to [facility] next Monday and would like to hand carry progress note. Patient will continue to benefit from skilled PT services to modify and progress therapeutic interventions, address functional mobility deficits, address ROM deficits, address strength deficits, analyze and address soft tissue restrictions, analyze and cue movement patterns, analyze and modify body mechanics/ergonomics, assess and modify postural abnormalities, and instruct in home and community integration to attain remaining goals.      [x]  See Plan of Care  []  See progress note/recertification  []  See Discharge Summary         Progress towards goals / Updated goals:  Long Term Goals:     to be accomplished within 9 weeks:   Patient will score at least 64 points on FOTO in order to maximize function and promote patient satisfaction with overall outcome. (Thirza Melissa is an established functional score where 100 = no disability)  Pre-Op FOTO: 52              Post-Op Reassess: 31   5/6/22              Last PN: 8/1/22 [id number] to be met in 4 more weeks     Patient will report less than 2/10 pain during all functional activities and ADLs in order to improve QOL and return to patient's PLOF. Post-Op Reassess: to be completed s/p surgery  Post-Op Reassess:  6-7/10 pain currently, with narcotics      5/6/22              Last PN: 8/1/22 rating worst pain in the last 48 hrs 7/10-slow progression-goal to be met in 4 more weeks     Patient will demonstrate functional and painfree AROM of at least 0-120 deg in order to return to prior functional activities and mobility. Pre-Op AROM: 8-125 deg  Post-Op Reassess:  AROM: 7-87 deg; AAROM: 5-93 deg      5/6/22              Last PN: 8/1/22 left knee AROM flexion 128 degrees after manual; knee extension: lacking 2 after manual-part'l MET-goal to be met in 4 more weeks              Current: MET 8/8/22 Left Knee AROM: 0-127*     Patient will demonstrate at least 5/5 strength bilaterally in order to be able to safely perform functional activities and demonstrate improved stability and strength.               Pre-Op Strength: Grossly 5/5 bilaterally except bilateral hip flexion 4+/5  Post-Op Reassess:  (+) extensor lag with mild atrophy of left quad, did not formally test other musculature (will at later date once better AROM is achieved)      5/6/22              Last PN:8/1/[id number] to met in 4 more weeks  Hip: Flex: (supine) right: 4/5 left:  4/5                         Abd (sidelying): 4-/5 bilaterally IR (seated): Left: 3+/5 Right: 4-/5                                      ER (seated): Left: 3+/5, Right: 4/5                          Knee: Flex: Left: 5-/5 (pain), Right: 5/5                                     Ext: Left: 5/5, Right: 5/5    Current: Progressing 8/9/22 as per tolerance with exercises, increased reps with elevated bridges and height with heel downs. Patient will be able to safely ambulate community distances without and normal gait mechanics in order to improve overall mobility and return patient to his or her PLOF. Eval: mildly antalgic, lacking bilateral TKE prior to heel strike (pre-op)  Post-Op Reassess:  With RW: stiff knee gait pattern including impaired heel/toe mechanics, lacking TKE prior to heel strike, decreased knee flexion during push off, and decrease knee flexion during swing, slow gait speed, WBOS     5/6/22  Last PN: 8/1/22 Pt walked . 5 miles and had inc in pain, pt continues to report stiffness, pt with dec TKE at heel strike and toe out-progressing-goal to be met in 4 more weeks  Current: Progressing 8/10/22 walked last night 0.5 miles with no pain     Patient will be able to safely negotiate a full flight of stairs with a step-through pattern while holding onto at least one handrail in order to return to normal with this functional activity and improve safety on stairs.   Eval: FF step-through pattern (pre-op)  Last PN: 8/1/22 pt continues to report pain with descending stairs-goal to be met in 4 more weeks  Current: Progressing 8/8/22 pt reported descending stairs this morning reciprocally    PLAN  []  Upgrade activities as tolerated     [x]  Continue plan of care  []  Update interventions per flow sheet       []  Discharge due to:_  []  Other:_      Tammi Rodrigues, PT, DPT 8/10/2022  7:45 AM    Future Appointments   Date Time Provider [facility]   8/15/2022  8:30 AM Lowell Hatfield, PT, DPT [facility]   8/17/2022  7:45 AM Lowell Hatfield PT, DPT [facility]   8/22/2022  8:30 AM Jeovanny Centeno PT [facility]   8/24/2022  7:45 AM Andrei Aiken PT, DPT [facility]   8/29/2022  8:30 AM Jeovanny Centeno PT [facility]   8/31/2022  7:45 AM Andrei Aiken PT, DPT [facility]

## 2022-08-15 ENCOUNTER — APPOINTMENT (OUTPATIENT)
Dept: PHYSICAL THERAPY | Age: 72
End: 2022-08-15
Payer: MEDICARE

## 2022-08-17 ENCOUNTER — HOSPITAL ENCOUNTER (OUTPATIENT)
Dept: PHYSICAL THERAPY | Age: 72
Discharge: HOME | End: 2022-08-17
Payer: MEDICARE

## 2022-08-17 PROCEDURE — 97110 THERAPEUTIC EXERCISES: CPT

## 2022-08-17 PROCEDURE — 97140 MANUAL THERAPY 1/> REGIONS: CPT

## 2022-08-17 PROCEDURE — 97112 NEUROMUSCULAR REEDUCATION: CPT

## 2022-08-17 PROCEDURE — 97530 THERAPEUTIC ACTIVITIES: CPT

## 2022-08-17 NOTE — PROGRESS NOTES
PT DAILY TREATMENT NOTE    Patient Name: Kika Erp  Date:2022  : 1950  [x]  Patient  Verified  Payor: Elizabeth Medici / Plan: [place] & B / Product Type: Medicare /    In time: 7:39 am  Out time:8:48 am  Total Treatment Time (min): 69  Total Timed Codes (min): 59  1:1 Treatment Time (MC/BCBS only): 61   Visit #: 39 of 40    Treatment Dx: Left knee pain [M25.562]    SUBJECTIVE  Pain Level (0-10 scale): 0  Any medication changes, allergies to medications, adverse drug reactions, diagnosis change, or new procedure performed?: [x] No    [] Yes (see summary sheet for update)  Subjective functional status/changes:   [] No changes reported  Pt reported that had to cancel appointment earlier in week due to meeting at work. Had MD appointment on Monday and per pt he was pleased with progress and feels is healing nicely. At present pt is most concerned with strength. OBJECTIVE    Modalities Rationale:     decrease inflammation and decrease pain to improve patient's ability to perform daily activities with decreased pain and symptom levels     min [] Estim, type/location:                                      []  att     []  unatt     []  w/US     []  w/ice    []  w/heat    min []  Mechanical Traction: type/lbs                   []  pro   []  sup   []  int   []  cont    []  before manual    []  after manual    min []  Ultrasound, settings/location:      min []  Iontophoresis w/ dexamethasone, location:                                               []  take home patch       []  in clinic    min []  Ice     []  Heat    location/position:    10 min [x]  Vasopneumatic Device, press/temp:  To left knee in supine with LE elevated     If using vaso (only need to measure limb vaso being performed on)      pre-treatment girth : 39.2 cm      post-treatment girth : 39 cm      measured at (landmark location) :  mid patella     min []  Other:    [x] Skin assessment post-treatment (if applicable):    [x] intact    []  redness- no adverse reaction                  []redness - adverse reaction:        15 min Therapeutic Exercise:  [x] See flow sheet :   Rationale: increase ROM, increase strength, improve coordination, and increase proprioception to improve the patients ability to perform daily activities with decreased pain and symptom levels      22 min Therapeutic Activity:  [x]  See flow sheet :   Rationale: increase ROM, increase strength, improve coordination, and increase proprioception  to improve the patients ability to perform daily activities with decreased pain and symptom levels       22 min Neuromuscular Re-education:  [x]  See flow sheet :   Rationale: increase ROM, increase strength, improve coordination, improve balance, and increase proprioception  to improve the patients ability to perform daily activities with decreased pain and symptom levels    With   [] TE   [] TA   [] neuro   [] other: Patient Education: [x] Review HEP    [] Progressed/Changed HEP based on:   [] positioning   [] body mechanics   [] transfers   [] heat/ice application    [] other:      Other Objective/Functional Measures:   Strong quad set on Left  No LOB with march and reach on turf   Visible fatigue with eccentric step ups at squat rack     Pain Level (0-10 scale) post treatment: 0    ASSESSMENT/Changes in Function:   Pt reports that ability to descend stairs reciprocally at times without pain. Has been more diligent with icing in the evening. Very challenged with eccentric step up at squat rack. Overall pt is progressing nicely. Primary complaint is of weakness at left LE.     Patient will continue to benefit from skilled PT services to modify and progress therapeutic interventions, address functional mobility deficits, address ROM deficits, address strength deficits, analyze and address soft tissue restrictions, analyze and cue movement patterns, analyze and modify body mechanics/ergonomics, assess and modify postural abnormalities, and instruct in home and community integration to attain remaining goals. [x]  See Plan of Care  []  See progress note/recertification  []  See Discharge Summary         Progress towards goals / Updated goals:  Long Term Goals:     to be accomplished within 9 weeks:   Patient will score at least 64 points on FOTO in order to maximize function and promote patient satisfaction with overall outcome. (Charlynn Scarlet is an established functional score where 100 = no disability)  Pre-Op FOTO: 52              Post-Op Reassess: 31   5/6/22              Last PN: 8/1/22 10-[id number]-fmbg to be met in 4 more weeks     Patient will report less than 2/10 pain during all functional activities and ADLs in order to improve QOL and return to patient's PLOF. Post-Op Reassess: to be completed s/p surgery  Post-Op Reassess:  6-7/10 pain currently, with narcotics      5/6/22              Last PN: 8/1/22 rating worst pain in the last 48 hrs 7/10-slow progression-goal to be met in 4 more weeks     Patient will demonstrate functional and painfree AROM of at least 0-120 deg in order to return to prior functional activities and mobility. Pre-Op AROM: 8-125 deg  Post-Op Reassess:  AROM: 7-87 deg; AAROM: 5-93 deg      5/6/22              Last PN: 8/1/22 left knee AROM flexion 128 degrees after manual; knee extension: lacking 2 after manual-part'l MET-goal to be met in 4 more weeks              Current: MET 8/8/22 Left Knee AROM: 0-127*     Patient will demonstrate at least 5/5 strength bilaterally in order to be able to safely perform functional activities and demonstrate improved stability and strength.               Pre-Op Strength: Grossly 5/5 bilaterally except bilateral hip flexion 4+/5  Post-Op Reassess:  (+) extensor lag with mild atrophy of left quad, did not formally test other musculature (will at later date once better AROM is achieved)      5/6/22              Last PN:8/1/2223-tymokgqvifn-ldcs to met in 4 more weeks  Hip: Flex: (supine) right: 4/5 left:  4/5                         Abd (sidelying): 4-/5 bilaterally                                      IR (seated): Left: 3+/5 Right: 4-/5                                      ER (seated): Left: 3+/5, Right: 4/5                          Knee: Flex: Left: 5-/5 (pain), Right: 5/5                                     Ext: Left: 5/5, Right: 5/5               Current: Progressing 8/17/22 as per tolerance with exercises, increased reps with elevated bridges and height with heel downs. Challenged with eccentric step ups      Patient will be able to safely ambulate community distances without and normal gait mechanics in order to improve overall mobility and return patient to his or her PLOF. Eval: mildly antalgic, lacking bilateral TKE prior to heel strike (pre-op)  Post-Op Reassess:  With RW: stiff knee gait pattern including impaired heel/toe mechanics, lacking TKE prior to heel strike, decreased knee flexion during push off, and decrease knee flexion during swing, slow gait speed, WBOS     5/6/22  Last PN: 8/1/22 Pt walked . 5 miles and had inc in pain, pt continues to report stiffness, pt with dec TKE at heel strike and toe out-progressing-goal to be met in 4 more weeks  Current: Progressing 8/10/22 walked last night 0.5 miles with no pain     Patient will be able to safely negotiate a full flight of stairs with a step-through pattern while holding onto at least one handrail in order to return to normal with this functional activity and improve safety on stairs.   Eval: FF step-through pattern (pre-op)  Last PN: 8/1/22 pt continues to report pain with descending stairs-goal to be met in 4 more weeks  Current: Progressing 8/17/22 pt reported descending stairs reciprocally without pain daily     PLAN  []  Upgrade activities as tolerated     [x]  Continue plan of care  []  Update interventions per flow sheet       []  Discharge due to:_  []  Other:_      Enrique Lee, PT, JOSE 8/17/2022  7:48 AM    Future Appointments   Date Time Provider Quincy Jacey   8/22/2022  8:30 AM Adelle Mcburney, PT [facility]   8/24/2022  7:45 AM India Rogers PT, DPT [facility]   8/29/2022  8:30 AM Adelle Mcburney, PT [facility]   8/31/2022  7:45 AM India Rogers PT, DPT [facility]

## 2022-08-22 ENCOUNTER — APPOINTMENT (OUTPATIENT)
Dept: PHYSICAL THERAPY | Age: 72
End: 2022-08-22
Payer: MEDICARE

## 2022-08-22 ENCOUNTER — HOSPITAL ENCOUNTER (OUTPATIENT)
Dept: PHYSICAL THERAPY | Age: 72
Discharge: HOME | End: 2022-08-22
Payer: MEDICARE

## 2022-08-22 ENCOUNTER — TELEPHONE (OUTPATIENT)
Dept: PHYSICAL THERAPY | Age: 72
End: 2022-08-22

## 2022-08-22 PROCEDURE — 97112 NEUROMUSCULAR REEDUCATION: CPT

## 2022-08-22 PROCEDURE — 97110 THERAPEUTIC EXERCISES: CPT

## 2022-08-22 PROCEDURE — 97016 VASOPNEUMATIC DEVICE THERAPY: CPT

## 2022-08-24 ENCOUNTER — APPOINTMENT (OUTPATIENT)
Dept: PHYSICAL THERAPY | Age: 72
End: 2022-08-24
Payer: MEDICARE

## 2022-08-24 ENCOUNTER — HOSPITAL ENCOUNTER (OUTPATIENT)
Dept: PHYSICAL THERAPY | Age: 72
Discharge: HOME | End: 2022-08-24
Payer: MEDICARE

## 2022-08-24 PROCEDURE — 97016 VASOPNEUMATIC DEVICE THERAPY: CPT

## 2022-08-24 PROCEDURE — 97110 THERAPEUTIC EXERCISES: CPT

## 2022-08-24 PROCEDURE — 97112 NEUROMUSCULAR REEDUCATION: CPT

## 2022-08-24 NOTE — PROGRESS NOTES
PT DAILY TREATMENT NOTE    Patient Name: Libby Horner  Date:2022  : 1950  [x]  Patient  Verified  Payor: Reji Rivas / Plan: [place] MEDICARE PART A & B / Product Type: Medicare /    In time:346  Out time:440  Total Treatment Time (min): 54  Total Timed Codes (min): 54  1:1 Treatment Time (MC/BCBS only): 44   Visit #: 45 of 40    Treatment Dx: Left knee pain [M25.562]    SUBJECTIVE  Pain Level (0-10 scale): 0  Any medication changes, allergies to medications, adverse drug reactions, diagnosis change, or new procedure performed?: [x] No    [] Yes (see summary sheet for update)  Subjective functional status/changes:   [] No changes reported  No pain but some soreness from last session.  Reports reduction in pain and increased ease with stairs    OBJECTIVE    Modalities Rationale:     decrease edema, decrease inflammation, and decrease pain to improve patient's ability to perform ADL   min [] Estim, type/location:                                      []  att     []  unatt     []  w/US     []  w/ice    []  w/heat    min []  Mechanical Traction: type/lbs                   []  pro   []  sup   []  int   []  cont    []  before manual    []  after manual    min []  Ultrasound, settings/location:      min []  Iontophoresis w/ dexamethasone, location:                                               []  take home patch       []  in clinic   10 min [x]  Ice     []  Heat    location/position: Left knee, elevated in supine    min []  Vasopneumatic Device, press/temp:    If using vaso (only need to measure limb vaso being performed on)      pre-treatment girth : 39 cm      post-treatment girth : 38.6 cm      measured at (landmark location) :left knee mid patella      min []  Other:    [] Skin assessment post-treatment (if applicable):    []  intact    []  redness- no adverse reaction                  []redness - adverse reaction:              29 min Therapeutic Exercise:  [x] See flow sheet :   Rationale: increase ROM, increase strength, and improve coordination to improve the patients ability to perform ADL    15 min Neuromuscular Re-education:  [x]  See flow sheet :   Rationale: increase strength, improve coordination, improve balance, and increase proprioception  to improve the patients ability to perform ADL               With   [x] TE   [] TA   [] neuro   [] other: Patient Education: [x] Review HEP    [] Progressed/Changed HEP based on:   [] positioning   [] body mechanics   [] transfers   [] heat/ice application    [] other:      Other Objective/Functional Measures:   Fatigue with dynamic left SLS activity in combination with cross reach  Good tolerance to bosu step up/over left , feeling of tightness    Pain Level (0-10 scale) post treatment: 0    ASSESSMENT/Changes in Function: patient performing all activities without c/o pain, signs of fatigue with dynamic SLS activities, improvement in eccentric quad control    Patient will continue to benefit from skilled PT services to address ROM deficits, address strength deficits, analyze and address soft tissue restrictions, and analyze and cue movement patterns to attain remaining goals. [x]  See Plan of Care  []  See progress note/recertification  []  See Discharge Summary         Progress towards goals / Updated goals:  Long Term Goals:     to be accomplished within 9 weeks:   Patient will score at least 64 points on FOTO in order to maximize function and promote patient satisfaction with overall outcome. (Richrd Davon is an established functional score where 100 = no disability)  Pre-Op FOTO: 52              Post-Op Reassess: 31   5/6/22              Last PN: 8/22/22:  FOTO 56-[id number]-[id number] to be met in 4 more weeks     Patient will report less than 2/10 pain during all functional activities and ADLs in order to improve QOL and return to patient's PLOF.   Post-Op Reassess: to be completed s/p surgery  Post-Op Reassess:  6-7/10 pain currently, with narcotics      5/6/22 Last PN: 8/1/22 rating worst pain in the last 48 hrs 7/10-slow progression-goal to be met in 4 more weeks  Status on 8/24/22: reports no pain today and overall significant reduction in pain with stair ambulation over the last week, max pain 1-2/10 with ADL, progressing     Patient will demonstrate functional and painfree AROM of at least 0-120 deg in order to return to prior functional activities and mobility. Pre-Op AROM: 8-125 deg  Post-Op Reassess:  AROM: 7-87 deg; AAROM: 5-93 deg      5/6/22              Last PN: 8/1/22 left knee AROM flexion 128 degrees after manual; knee extension: lacking 2 after manual-part'l MET-goal to be met in 4 more weeks              Current: MET 8/8/22 Left Knee AROM: 0-127*     Patient will demonstrate at least 5/5 strength bilaterally in order to be able to safely perform functional activities and demonstrate improved stability and strength. Pre-Op Strength: Grossly 5/5 bilaterally except bilateral hip flexion 4+/5  Post-Op Reassess:  (+) extensor lag with mild atrophy of left quad, did not formally test other musculature (will at later date once better AROM is achieved)      5/6/22              Last PN:8/1/2220-nhjuwiglhgv-tlnd to met in 4 more weeks  Hip: Flex: (supine) right: 4/5 left:  4/5                         Abd (sidelying): 4-/5 bilaterally                                      IR (seated): Left: 3+/5 Right: 4-/5                                      ER (seated): Left: 3+/5, Right: 4/5                          Knee: Flex: Left: 5-/5 (pain), Right: 5/5                                     Ext: Left: 5/5, Right: 5/5               Current: Progressing 8/17/22 as per tolerance with exercises, increased reps with elevated bridges and height with heel downs.  Challenged with eccentric step ups      Patient will be able to safely ambulate community distances without and normal gait mechanics in order to improve overall mobility and return patient to his or her PLOF.  Eval: mildly antalgic, lacking bilateral TKE prior to heel strike (pre-op)  Post-Op Reassess:  With RW: stiff knee gait pattern including impaired heel/toe mechanics, lacking TKE prior to heel strike, decreased knee flexion during push off, and decrease knee flexion during swing, slow gait speed, WBOS     5/6/22  Last PN: 8/1/22 Pt walked . 5 miles and had inc in pain, pt continues to report stiffness, pt with dec TKE at heel strike and toe out-progressing-goal to be met in 4 more weeks  Current: Progressing 8/10/22 walked last night 0.5 miles with no pain  Status on 8/22/22: patient unsure if able to walk 1 mile, discussed gradual increase in walking distance     Patient will be able to safely negotiate a full flight of stairs with a step-through pattern while holding onto at least one handrail in order to return to normal with this functional activity and improve safety on stairs.   Eval: FF step-through pattern (pre-op)  Last PN: 8/1/22 pt continues to report pain with descending stairs-goal to be met in 4 more weeks  Current: Progressing 8/17/22 pt reported descending stairs reciprocally without pain daily        PLAN  []  Upgrade activities as tolerated     [x]  Continue plan of care  []  Update interventions per flow sheet       []  Discharge due to:_  []  Other:_      Anthony Romero, PT 8/24/2022  4:06 PM    Future Appointments   Date Time Provider Quincy Mari   8/29/2022  8:30 AM Jennifer Lawton, PT [facility]   8/31/2022  7:45 AM Alden Homans, PT, DPT [facility]

## 2022-08-29 ENCOUNTER — HOSPITAL ENCOUNTER (OUTPATIENT)
Dept: PHYSICAL THERAPY | Age: 72
Discharge: HOME | End: 2022-08-29
Payer: MEDICARE

## 2022-08-29 PROCEDURE — 97110 THERAPEUTIC EXERCISES: CPT

## 2022-08-29 PROCEDURE — 97112 NEUROMUSCULAR REEDUCATION: CPT

## 2022-08-29 PROCEDURE — 97016 VASOPNEUMATIC DEVICE THERAPY: CPT

## 2022-08-29 NOTE — PROGRESS NOTES
PT DAILY TREATMENT NOTE    Patient Name: Carlee Marin  Date:2022  : 1950  [x]  Patient  Verified  Payor: [place] MEDICARE / Plan: [place] MEDICARE PART A & B / Product Type: Medicare /    In time:840  Out time:935  Total Treatment Time (min): 55  Total Timed Codes (min): 55  1:1 Treatment Time (MC/BCBS only): 39   Visit #: 44 of 40    Treatment Dx: Left knee pain [M25.562]    SUBJECTIVE  Pain Level (0-10 scale): 0  Any medication changes, allergies to medications, adverse drug reactions, diagnosis change, or new procedure performed?: [x] No    [] Yes (see summary sheet for update)  Subjective functional status/changes:   [] No changes reported  \"I have been able to walk 1 mile without pain\"    OBJECTIVE    Modalities Rationale:     decrease edema, decrease inflammation, and decrease pain to improve patient's ability to perform ADL   min [] Estim, type/location:                                      []  att     []  unatt     []  w/US     []  w/ice    []  w/heat    min []  Mechanical Traction: type/lbs                   []  pro   []  sup   []  int   []  cont    []  before manual    []  after manual    min []  Ultrasound, settings/location:      min []  Iontophoresis w/ dexamethasone, location:                                               []  take home patch       []  in clinic    min []  Ice     []  Heat    location/position:     min []  Vasopneumatic Device, press/temp:    If using vaso (only need to measure limb vaso being performed on)      pre-treatment girth : 42 cm      post-treatment girth : 41.5c m      measured at (landmark location) : left knee, mid patella     min []  Other:    [] Skin assessment post-treatment (if applicable):    []  intact    []  redness- no adverse reaction                  []redness - adverse reaction:              25 min Therapeutic Exercise:  [x] See flow sheet :   Rationale: increase ROM, increase strength, and improve coordination to improve the patients ability to perform ADL         20 min Neuromuscular Re-education:  [x]  See flow sheet :   Rationale: increase strength, improve coordination, improve balance, and increase proprioception  to improve the patients ability to perform ADL              With   [x] TE   [] TA   [] neuro   [] other: Patient Education: [x] Review HEP    [] Progressed/Changed HEP based on:   [] positioning   [] body mechanics   [] transfers   [] heat/ice application    [] other:      Other Objective/Functional Measures: residual deficit in ecc strength left knee     Pain Level (0-10 scale) post treatment: 0    ASSESSMENT/Changes in Function: excellent tolerance to activities, slight challenge with eccentric control, feeling of tightness when descending stairs, no pain    Patient will continue to benefit from skilled PT services to address ROM deficits, address strength deficits, and analyze and cue movement patterns to attain remaining goals. [x]  See Plan of Care  []  See progress note/recertification  []  See Discharge Summary       Progress towards goals / Updated goals:  Long Term Goals:     to be accomplished within 9 weeks:   Patient will score at least 64 points on FOTO in order to maximize function and promote patient satisfaction with overall outcome. (Baltazar Busman is an established functional score where 100 = no disability)  Pre-Op FOTO: 52              Post-Op Reassess: 31   5/6/22              Last PN: 8/22/22:  FOTO [id number]-mxon to be met in 4 more weeks  Current status : NV     Patient will report less than 2/10 pain during all functional activities and ADLs in order to improve QOL and return to patient's PLOF.   Post-Op Reassess: to be completed s/p surgery  Post-Op Reassess:  6-7/10 pain currently, with narcotics      5/6/22              Last PN: 8/1/22 rating worst pain in the last 48 hrs 7/10-slow progression-goal to be met in 4 more weeks  Status on 8/24/22: reports no pain today and overall significant reduction in pain with stair ambulation over the last week, max pain 1-2/10 with ADL, progressing     Patient will demonstrate functional and painfree AROM of at least 0-120 deg in order to return to prior functional activities and mobility. Pre-Op AROM: 8-125 deg  Post-Op Reassess:  AROM: 7-87 deg; AAROM: 5-93 deg      5/6/22              Last PN: 8/1/22 left knee AROM flexion 128 degrees after manual; knee extension: lacking 2 after manual-part'l MET-goal to be met in 4 more weeks              Current: MET 8/8/22 Left Knee AROM: 0-127*     Patient will demonstrate at least 5/5 strength bilaterally in order to be able to safely perform functional activities and demonstrate improved stability and strength. Pre-Op Strength: Grossly 5/5 bilaterally except bilateral hip flexion 4+/5  Post-Op Reassess:  (+) extensor lag with mild atrophy of left quad, did not formally test other musculature (will at later date once better AROM is achieved)      5/6/22              Last PN:8/1/2291-vvwtglveryo-ogsy to met in 4 more weeks  Hip: Flex: (supine) right: 4/5 left:  4/5                         Abd (sidelying): 4-/5 bilaterally                                      IR (seated): Left: 3+/5 Right: 4-/5                                      ER (seated): Left: 3+/5, Right: 4/5                          Knee: Flex: Left: 5-/5 (pain), Right: 5/5                                     Ext: Left: 5/5, Right: 5/5               Current: Progressing 8/17/22 as per tolerance with exercises, increased reps with elevated bridges and height with heel downs.  Challenged with eccentric step ups     PLAN  []  Upgrade activities as tolerated     [x]  Continue plan of care  []  Update interventions per flow sheet       []  Discharge due to:_  []  Other:_      Margot Dailey PT 8/29/2022  8:54 AM    Future Appointments   Date Time Provider Quincy Mari   8/31/2022  7:45 AM Theresa Lazaro, PT, DPT [facility]

## 2022-08-31 ENCOUNTER — HOSPITAL ENCOUNTER (OUTPATIENT)
Dept: PHYSICAL THERAPY | Age: 72
Discharge: HOME | End: 2022-08-31
Payer: MEDICARE

## 2022-08-31 PROCEDURE — 97112 NEUROMUSCULAR REEDUCATION: CPT

## 2022-08-31 PROCEDURE — 97530 THERAPEUTIC ACTIVITIES: CPT

## 2022-08-31 PROCEDURE — 97110 THERAPEUTIC EXERCISES: CPT

## 2022-08-31 NOTE — PROGRESS NOTES
Physical Therapy Discharge Instructions    [facility] at the [address], [facility] [place], [address] [facility]  Phone: [phone]      Fax:  [phone]      Patient: Cris Ramirez  : 1950      Continue Home Exercise Program 4 times per week      Continue with    [x] Ice  as needed     [] Heat           Follow up with MD:     [] Upon completion of therapy     [x] As needed      Recommendations:     [x]   Return to activity with home program    []   Return to activity with the following modifications:       []Post Rehab Program    []Join Independent aquatic program     []Return to/join local gym        Additional Comments: Please feel free to contact clinic with questions and if feels need to return contact MD for referral          Erin Perez PT, DPT 2022 8:20 AM

## 2022-08-31 NOTE — PROGRESS NOTES
PT DAILY TREATMENT NOTE    Patient Name: Ezra Mccray  Date:2022  : 1950  [x]  Patient  Verified  Payor: Rock Messina / Plan: [place] / Product Type: Medicare /    In time:7:35 am  Out time:8:30 am  Total Treatment Time (min): 55  Total Timed Codes (min): 55  1:1 Treatment Time (MC/BCBS only): 54   Visit #: 40 of 40    Treatment Dx: Left knee pain [M25.562]    SUBJECTIVE  Pain Level (0-10 scale): 0  Any medication changes, allergies to medications, adverse drug reactions, diagnosis change, or new procedure performed?: [x] No    [] Yes (see summary sheet for update)  Subjective functional status/changes:   [] No changes reported  \"I am good. \"   Reported that things are \"getting better\" decreased stiffness in morning and some discomfort with end range flexion    OBJECTIVE    15 min Therapeutic Exercise:  [x] See flow sheet :   Rationale: increase ROM, increase strength, improve coordination, and increase proprioception to improve the patients ability to perform daily activities with decreased pain and symptom levels      25 min Therapeutic Activity:  [x]  See flow sheet : review of HEP, FOTO, Progress toward goals    Rationale: increase ROM, increase strength, improve coordination, improve balance, and increase proprioception  to improve the patients ability to perform daily activities with decreased pain and symptom levels       15 min Neuromuscular Re-education:  [x]  See flow sheet :   Rationale: increase ROM, increase strength, improve coordination, improve balance, and increase proprioception  to improve the patients ability to perform daily activities with decreased pain and symptom levels            With   [] TE   [] TA   [] neuro   [] other: Patient Education: [x] Review HEP    [] Progressed/Changed HEP based on:   [] positioning   [] body mechanics   [] transfers   [] heat/ice application    [] other:      Other Objective/Functional Measures:    FOTO: 67     Pain Level (0-10 scale) post treatment: 0    ASSESSMENT/Changes in Function:   Pt has been seen 40 visits including pre-op and post-op eval for left TKR with DOS 5/4/22. Pt has progressed very nicely. Reports intermittent discomfort \"inside and out\"  with end range flexion but overall has progressed to walking 1 mile and descending stairs reciprocally without pain. Focussed last month on trunk and pelvis position, strengthening in functional positions and gait mechanics. Have updated HEP and reviewed with pt. Pt is D/C at this time due to meeting goals. []  See Plan of Care  []  See progress note/recertification  [x]  See Discharge Summary         Progress towards goals / Updated goals:  Long Term Goals:     to be accomplished within 9 weeks:   Patient will score at least 64 points on FOTO in order to maximize function and promote patient satisfaction with overall outcome. (Janmichael Hires is an established functional score where 100 = no disability)  Pre-Op FOTO: 52              Post-Op Reassess: 31   5/6/22              Last PN: 8/22/22:  FOTO [id number] to be met in 4 more weeks  Current status : MET 8/31/22 67     Patient will report less than 2/10 pain during all functional activities and ADLs in order to improve QOL and return to patient's PLOF. Post-Op Reassess: to be completed s/p surgery  Post-Op Reassess:  6-7/10 pain currently, with narcotics      5/6/22              Last PN: 8/1/22 rating worst pain in the last 48 hrs 7/10-slow progression-goal to be met in 4 more weeks  Current: MET 8/31/22 pain no greater than 2/10      Patient will demonstrate functional and painfree AROM of at least 0-120 deg in order to return to prior functional activities and mobility.   Pre-Op AROM: 8-125 deg  Post-Op Reassess:  AROM: 7-87 deg; AAROM: 5-93 deg      5/6/22              Last PN: 8/1/22 left knee AROM flexion 128 degrees after manual; knee extension: lacking 2 after manual-part'l MET-goal to be met in 4 more weeks Current: MET 8/8/22 Left Knee AROM: 0-127*     Patient will demonstrate at least 5/5 strength bilaterally in order to be able to safely perform functional activities and demonstrate improved stability and strength. Pre-Op Strength: Grossly 5/5 bilaterally except bilateral hip flexion 4+/5  Post-Op Reassess:  (+) extensor lag with mild atrophy of left quad, did not formally test other musculature (will at later date once better AROM is achieved)      5/6/22              Last PN:8/1/2281-vuftoikendb-djdq to met in 4 more weeks  Hip: Flex: (supine) right: 4/5 left:  4/5                         Abd (sidelying): 4-/5 bilaterally                                      IR (seated): Left: 3+/5 Right: 4-/5                                      ER (seated): Left: 3+/5, Right: 4/5                          Knee: Flex: Left: 5-/5 (pain), Right: 5/5                                     Ext: Left: 5/5, Right: 5/5               Current: Partially MET 8/31/22   IR: 4/5  ER: 4/5  All other directions 5/5      PLAN  []  Upgrade activities as tolerated     []  Continue plan of care  []  Update interventions per flow sheet       [x]  Discharge due to: meeting goals  []  Other:_      Gennie Ganser, PT, DPT 8/31/2022  7:47 AM    No future appointments.

## 2022-08-31 NOTE — PROGRESS NOTES
[facility] at the [address], [place], [address] [facility]  Phone: [phone]      Fax:  [phone]            Discharge Summary    Patient name: Yuri Garcia Start of Care: 22   Referral source: Murray Hoskins MD : 1950   Medical/Treatment Diagnosis: Left knee pain [M25.562] Onset Date:2022      Prior Hospitalization: see medical history Provider#: [id number]   Medications: Verified on Patient Summary List     Comorbidities: s/p right TKR in , cataract surgery, facial cyst removal, prostate issues (no cancer), hypertension, high cholesterol, sleep apnea  Prior Level of Function:   Unrestricted with functional activities and ADLs  [x] No assistive device  [x] active lifestyle, [] moderately active lifestyle, [] sedentary lifestyle       Visits from Start of Care: 40    Missed Visits: 2    Reporting Period : 22  to 22    Goals/Measure of Progress:  Long Term Goals:     to be accomplished within 9 weeks:   Patient will score at least 64 points on FOTO in order to maximize function and promote patient satisfaction with overall outcome. (Janeann Hires is an established functional score where 100 = no disability)  Pre-Op FOTO: 52              Post-Op Reassess: 31   22              Last PN: 22:  FOTO 41-[id number] to be met in 4 more weeks  Current status : MET 22 67     Patient will report less than 2/10 pain during all functional activities and ADLs in order to improve QOL and return to patient's PLOF.   Post-Op Reassess: to be completed s/p surgery  Post-Op Reassess:  6-7/10 pain currently, with narcotics      22              Last PN: 22 rating worst pain in the last 48 hrs 7/10-slow progression-goal to be met in 4 more weeks  Current: MET 22 pain no greater than 2/10      Patient will demonstrate functional and painfree AROM of at least 0-120 deg in order to return to prior functional activities and mobility. Pre-Op AROM: 8-125 deg  Post-Op Reassess:  AROM: 7-87 deg; AAROM: 5-93 deg      5/6/22              Last PN: 8/1/22 left knee AROM flexion 128 degrees after manual; knee extension: lacking 2 after manual-part'l MET-goal to be met in 4 more weeks              Current: MET 8/8/22 Left Knee AROM: 0-127*     Patient will demonstrate at least 5/5 strength bilaterally in order to be able to safely perform functional activities and demonstrate improved stability and strength. Pre-Op Strength: Grossly 5/5 bilaterally except bilateral hip flexion 4+/5  Post-Op Reassess:  (+) extensor lag with mild atrophy of left quad, did not formally test other musculature (will at later date once better AROM is achieved)      5/6/22              Last PN:8/1/2201-hkwrffgcucd-yjux to met in 4 more weeks  Hip: Flex: (supine) right: 4/5 left:  4/5                         Abd (sidelying): 4-/5 bilaterally                                      IR (seated): Left: 3+/5 Right: 4-/5                                      ER (seated): Left: 3+/5, Right: 4/5                          Knee: Flex: Left: 5-/5 (pain), Right: 5/5                                     Ext: Left: 5/5, Right: 5/5               Current: Partially MET 8/31/22   IR: 4/5  ER: 4/5  All other directions 5/5        Assessment/ Summary of Care:   Pt has been seen 40 visits including pre-op and post-op eval for left TKR with DOS 5/4/22. Pt has progressed very nicely. Reports intermittent discomfort \"inside and out\"  with end range flexion but overall has progressed to walking 1 mile and descending stairs reciprocally without pain. Focussed last month on trunk and pelvis position, strengthening in functional positions and gait mechanics. Have updated HEP and reviewed with pt. Pt is D/C at this time due to meeting goals.         RECOMMENDATIONS:  [x]Discontinue therapy: [x]Patient has reached or is progressing toward set goals      []Patient is non-compliant or has abdicated      []Due to lack of appreciable progress towards set goals    Enrique Lee, PT, DPT 8/31/2022 8:41 AM

## 2023-01-04 ENCOUNTER — HOSPITAL ENCOUNTER (OUTPATIENT)
Dept: PHYSICAL THERAPY | Age: 73
Discharge: HOME | End: 2023-01-04
Payer: MEDICARE

## 2023-01-04 PROCEDURE — 97530 THERAPEUTIC ACTIVITIES: CPT

## 2023-01-04 PROCEDURE — 97162 PT EVAL MOD COMPLEX 30 MIN: CPT

## 2023-01-04 NOTE — PROGRESS NOTES
PT DAILY TREATMENT NOTE    Patient Name: Estuardo Born  Date:2023  : 1950  [x]  Patient  Verified  Payor: [place] MEDICARE / Plan: [place] MEDICARE PART A & B / Product Type: Medicare /    In time:9:24 am   Out time:10:10 am   Total Treatment Time (min): 46  Total Timed Codes (min): 23  1:1 Treatment Time (MC/BCBS only): 55   Visit #: 1 of 24    Treatment Dx: Pain in right knee [M25.561]  Other low back pain [M54.59]    SUBJECTIVE  Pain Level (0-10 scale): 2-3  Any medication changes, allergies to medications, adverse drug reactions, diagnosis change, or new procedure performed?: [x] No    [] Yes (see summary sheet for update)  Subjective functional status/changes:   [] No changes reported    Hx Present Illness: C/C Left LE pain and intermittent LBP  Primary Complaint of Left knee stiffness, LE pain and numbness in lateral left calf, medial ankle along posterior tib  \"My leg, my knee is not right\"  Increase in tightness in left anterior calf with walking, swelling,   Swelling at inside of left ankle  Most difficulty with standing (lateral knee), walking, lifting, bending and stooping   Does not feel knee is stable   PLOF: recovering from TKR - able to walk 1 to 2 miles without pain, was able to participate in gym based exercise   Stiff getting up in the morning, \"stiffer all the way around. \"      Pain:  _5__/10 max       __2_/10 min     _2-3___/10 now    Location:Left Side of Lumbar spine, left posterior tib to arch of foot, lateral left calf, left anterior tib  Lateral left knee      [] Sharp    [] Dull      [] Burning     []  Aching     [] Throbbing      [] Tingling     [x] Other: \"stiffness and weaker\"      [x]  Constant                   [x] Intermittent        Previous treatment:   PT following TKR in     PMHX: PMHx/Surgical Hx:  Right and Left TKR    Social/Recreation/Work: Work Hx: [employer] Situation: lives with wife, 2 story home   Recreational Activities: walking, grand children Patient Goal(s): \"Stronger and without pain\"    Cognition: A & O x 4      OBJECTIVE      23 min [x]Eval                  []Re-Eval          With   [] TE   [x] TA - 23 min    [] neuro   [] other: Patient Education: [x] Review HEP    [] Progressed/Changed HEP based on:   [] positioning   [] body mechanics   [] transfers   [] heat/ice application    [x] other:  pt education regarding exam findings, anatomy involved, POC and need for compliance with HEP       Other Objective/Functional Measures:    Movement/gait:  toe out bilaterally, left >right  Posteiror lean     Visual Inspection: Thoracic: flattened  Lumbar: WFL/slightly decreased   Shoulder/Scapula: right slightly lower tipped at inferior angle   Visible edema at bilateral LE - discussed with pt - pt is wearing knee high compression socks     Palpation: TTP left posterior hip  Increased tone at lumbar paraspinals   Palpable clunking at left knee and patella femoral joint   Bilateral rib flare                           AROM Right Left   Standing [place] Flexion: 3 in from ground          Knee Ext - Flex  0-128 1-119*                  **Noted very restricted great toe ext on left    Strength Right Left   Quad Set strong Strong    Hip Flex 27 pounds 30 pounds   Knee Ext 70 pounds  60 pounds   Hamstrings 40 pounds 25 pounds   DF [facility] WFL   PF [facility] WFL   **tested with Handheld dynamometer     Special Tests Right Left   slump - -   SLR - -   Passive SLR 70 65   Gaenslens - -               Reflexes Right Left   L4 2+ 2+   S1 2+ 2+                             Other Comments: Standing Forward Flexion 3 in from floor with dcr reversal of lordosis   Gillet: hypomobile bilaterally Left >right     KORI Special Tests   HGIR:                                                 Right: 50             Left: 85  Hip Add Drop Test:                           Right: +             Left:+  Passive hip Abduction raise  Right: +  Left: +  Trunk Rot Right: 20 in    Left 19.5 in   Shoulder Horizontal Abduction          Right: 50              Left: 33      Apical Ext Test:                                  Right: dcr             Left: dcr  FA ER     Right: 20  Left: 20  FA IR     Right: 23  Left: 20           Pain Level (0-10 scale) post treatment: 2    ASSESSMENT/Changes in Function:   Pt is a 74 y.o. male with C/C of Left knee pain and lateral calf and medial ankle pain and stiffness. Reports intermittent left sided LBP but primary compliant is \"my left leg and knee are not right. \" Pt is S/P Left TKR in May of 2022. Reports since D/C from PT has had regression in ROM and increase in stiffness. Also reports localized numbness and stiffness at left anterior tib and lateral calf and along left posterior tib to arch of foot. With examination note symmetrical LE reflexes and no reproduction of sx with neural tension tests but did increase dsx with gait. Other objective findings include postural adaptations, decreased left knee AROM, decreased hip IR and ER bilaterally, decreased left quad and hamstring strength (tested with dynamometer)decreased trunk rotation, glut and hamstring inhibition and gait dysfunction. Patient will continue to benefit from skilled PT services to modify and progress therapeutic interventions, address functional mobility deficits, address ROM deficits, address strength deficits, analyze and address soft tissue restrictions, analyze and cue movement patterns, analyze and modify body mechanics/ergonomics, assess and modify postural abnormalities, address imbalance/dizziness, and instruct in home and community integration to attain remaining goals. [x]  See Plan of Care  []  See progress note/recertification  []  See Discharge Summary         Progress towards goals / Updated goals:  Short Term Goals: STG- To be accomplished in 5 visits(s):  1. Pt will be compliant with HEP to encourage prophylaxis. Eval:dispensed, to be updated     2. Pt will improve trunk rotation to 17 in bilaterally to indicate mobility needed for gait. Eval: Right: 20 in Left: 19.5 in      Long Term Goals: LTG- To be accomplished in 24 visit(s):  1. Pt will improve hip adduction drop to negative with HEP to indicate femoral acetabular mobility needed for gait. Eval:positive bilaterally     2. Pt will be able to walk >1 mile without left LE or back pain to increase tolerance to daily activities. Eval:pain within 1 mile     3. Pt increase left LE strength to within 5 pounds of right leg to increase tolerance to work and recrational activites and improve left knee stability. Eval:  Strength Right Left   Quad Set strong Strong    Hip Flex 27 pounds 30 pounds   Knee Ext 70 pounds  60 pounds   Hamstrings 40 pounds 25 pounds   **tested with Handheld dynamometer     4. Pt FOTO score will increase by 5 points to show improvement in function. Eval:67  Current: will address at visit 5  *FOTO score is an established functional score where 100 = no disability*    5. Pt will improve left Knee AROM to equal that of right to increase tolerance to daily activities. Eval   AROM Right Left   Knee Ext - Flex  0-128 1-119*   **Noted very restricted great toe ext on left    PLAN  []  Upgrade activities as tolerated     [x]  Continue plan of care  []  Update interventions per flow sheet       []  Discharge due to:_  []  Other:_      Francheska Taveras PT, DPT 1/4/2023  9:26 AM    No future appointments.

## 2023-01-04 NOTE — PROGRESS NOTES
[facility] at the [address], [place], [address] [facility]  Phone: [phone]      Fax:  [phone]      Plan of Care/ Statement of Necessity for Physical Therapy Services      Patient name: Estuardo Wiseman Start of Care: 2023   Referral source: Cristiana Florez MD : 1950    Medical Diagnosis: Pain in right knee [M25.561]  Other low back pain [M54.59]   Onset Date:Ongoing May 2022    Treatment Diagnosis: Left Knee Pain   LBP   Prior Hospitalization: see medical history Provider#: [id number]   Medications: Verified on Patient summary List    Comorbidities: previous surgery, HTN Allergies, Hx of LBP   Prior Level of Function: able to walk 1-2 miles, unrestricted with ADLs, work and recreation       The Plan of Care and following information is based on the information from the initial evaluation. Assessment/ key information:   Pt is a 74 y.o. male with C/C of Left knee pain and lateral calf and medial ankle pain and stiffness. Reports intermittent left sided LBP but primary compliant is \"my left leg and knee are not right. \" Pt is S/P Left TKR in May of 2022. Reports since D/C from PT has had regression in ROM and increase in stiffness. Also reports localized numbness and stiffness at left anterior tib and lateral calf and along left posterior tib to arch of foot. With examination note symmetrical LE reflexes and no reproduction of sx with neural tension tests but did increase dsx with gait. Other objective findings include postural adaptations, decreased left knee AROM, decreased hip IR and ER bilaterally, decreased left quad and hamstring strength (tested with dynamometer)decreased trunk rotation, glut and hamstring inhibition and gait dysfunction.      Evaluation Complexity History HIGH Complexity :3+ comorbidities / personal factors will impact the outcome/ POC ; Examination HIGH Complexity : 4+ Standardized tests and measures addressing body None structure, function, activity limitation and / or participation in recreation  ;Presentation MEDIUM Complexity : Evolving with changing characteristics  ; Clinical Decision Making MEDIUM Complexity : FOTO score of 26-74 FOTO score = an established functional score where 100 = no disability  Overall Complexity Rating: MEDIUM  Problem List: pain affecting function, decrease ROM, decrease strength, edema affecting function, impaired gait/ balance, decrease ADL/ functional abilitiies, decrease activity tolerance, and decrease flexibility/ joint mobility   Treatment Plan may include any combination of the following: Therapeutic exercise, Neuromuscular reeducation, Manual therapy, Therapeutic activity, Self care/home management, Electric stim unattended , Vasopneumatic device, Gait training, and Electric stim attended  Vasopnuematic compression justification:  Per bilateral girth measures taken and listed above the edema is considered significant and having an impact on the patient's strength, gait, self care, and ADLs  Patient / Family readiness to learn indicated by: asking questions, trying to perform skills, and interest  Persons(s) to be included in education: patient (P)  Barriers to Learning/Limitations: None  Patient Goal (s): Strength without pain  Patient Self Reported Health Status: good  Rehabilitation Potential: good    Short Term Goals: STG- To be accomplished in 5 visits(s):  1. Pt will be compliant with HEP to encourage prophylaxis. Eval:dispensed, to be updated      2. Pt will improve trunk rotation to 17 in bilaterally to indicate mobility needed for gait. Eval: Right: 20 in         Left: 19.5 in       Long Term Goals: LTG- To be accomplished in 24 visit(s):  1. Pt will improve hip adduction drop to negative with HEP to indicate femoral acetabular mobility needed for gait. Eval:positive bilaterally      2.   Pt will be able to walk >1 mile without left LE or back pain to increase tolerance to daily activities. Eval:pain within 1 mile      3. Pt increase left LE strength to within 5 pounds of right leg to increase tolerance to work and recrational activites and improve left knee stability. Eval:  Strength Right Left   Quad Set strong Strong    Hip Flex 27 pounds 30 pounds   Knee Ext 70 pounds  60 pounds   Hamstrings 40 pounds 25 pounds   **tested with Handheld dynamometer      4. Pt FOTO score will increase by 5 points to show improvement in function. Eval:67  Current: will address at visit 5  *FOTO score is an established functional score where 100 = no disability*     5. Pt will improve left Knee AROM to equal that of right to increase tolerance to daily activities. Eval   AROM Right Left   Knee Ext - Flex  0-128 1-119*   **Noted very restricted great toe ext on left    Frequency / Duration: Patient to be seen 2 times per week for 12 weeks. Patient/ Caregiver education and instruction: Diagnosis, prognosis, self care, activity modification, and exercises   [x]  Plan of care has been reviewed with PTA    Certification Period: 1/4/23 - 4/2/23  Nelda Sinha PT, DPT 1/4/2023 3:10 PM  _____________________________________________________________________  I certify that the above Therapy Services are being furnished while the patient is under my care. I agree with the treatment plan and certify that this therapy is necessary.     [de-identified] Signature:____________Date:_________TIME:________                                      Nikki Garcia MD    ** Signature, Date and Time must be completed for valid certification **    Please sign and return to [facility] at the [address], [address] [facility]       Phone: [phone]      Fax:  [phone]

## 2023-01-06 ENCOUNTER — HOSPITAL ENCOUNTER (OUTPATIENT)
Dept: PHYSICAL THERAPY | Age: 73
Discharge: HOME | End: 2023-01-06
Payer: MEDICARE

## 2023-01-06 PROCEDURE — 97140 MANUAL THERAPY 1/> REGIONS: CPT

## 2023-01-06 PROCEDURE — 97112 NEUROMUSCULAR REEDUCATION: CPT

## 2023-01-06 NOTE — PROGRESS NOTES
PT DAILY TREATMENT NOTE    Patient Name: Gay Woods  Date:2023  : 1950  [x]  Patient  Verified  Payor: Adrian Gutierrez / Plan: [place] / Product Type: Medicare /    In time:8:31 am  Out time:9:20  Total Treatment Time (min): 49  Total Timed Codes (min): 49  1:1 Treatment Time (MC/BCBS only): 40   Visit #: 2 of 24    Treatment Dx:  Other low back pain [M54.59]  Left knee pain [M25.562]    SUBJECTIVE  Pain Level (0-10 scale): 2-3  Any medication changes, allergies to medications, adverse drug reactions, diagnosis change, or new procedure performed?: [x] No    [] Yes (see summary sheet for update)  Subjective functional status/changes:   [] No changes reported  Reported HEP activity decreased left sided LBP, continued stiffness in left LE    OBJECTIVE    5 min Therapeutic Exercise:  [] See flow sheet :   Rationale: increase ROM, increase strength, improve coordination, and increase proprioception to improve the patients ability to perform daily activities with decreased pain and symptom levels      10 min Therapeutic Activity:  []  See flow sheet :   Rationale: increase ROM, increase strength, improve coordination, and increase proprioception  to improve the patients ability to perform daily activities with decreased pain and symptom levels       24 min Neuromuscular Re-education:  []  See flow sheet :   Rationale: increase ROM, increase strength, improve coordination, and increase proprioception  to improve the patients ability to perform daily activities with decreased pain and symptom levels      10 min Manual Therapy:  KORI AIC correction, Sternal mobilization with active pelvic tilt, Superior T4 (KORI technique), Right subclavius release (KORI technique), KORI infraclavicular rib pump with active breath   Manual stretching of left posteiror hip capsule   Obtained consent for hand placement      Rationale: decrease pain, increase ROM, and increase tissue extensibility to improve the patients ability to perform daily activities with decreased pain and symptom levels    The manual therapy interventions were performed at a separate and distinct time from the therapeutic activities interventions. With   [] TE   [] TA   [] neuro   [] other: Patient Education: [x] Review HEP    [] Progressed/Changed HEP based on:   [] positioning   [] body mechanics   [] transfers   [] heat/ice application    [] other:      Other Objective/Functional Measures:   KORI Special Tests (pre/post)  HGIR:                                                 Right: 65/90             Left: 85/90  Hip Add Drop Test:                           Right: +/-            Left:+/midline  Trunk Rot                                           Right: 18 in/NT    Left 18 in  /NT     Apical Ext Test:                                  Right: dcr             Left: dcr     Unable to assume quadruped due to left knee pain   Left Knee AROM Flexion:122*    Marked toe out with gait     In supine bilateral LE fall into ER with feet touching table     Pain Level (0-10 scale) post treatment: 1    ASSESSMENT/Changes in Function:   Initiated PC this session with good tolerance. Was very challenged with 90-90 and hamstring facilitation = needed maximal positional cues. Very challenged with hip shift. Right glut was fatigued with glut max and challenged with bench planks. Patient will continue to benefit from skilled PT services to modify and progress therapeutic interventions, address functional mobility deficits, address ROM deficits, address strength deficits, analyze and address soft tissue restrictions, analyze and cue movement patterns, analyze and modify body mechanics/ergonomics, assess and modify postural abnormalities, and instruct in home and community integration to attain remaining goals.      [x]  See Plan of Care  []  See progress note/recertification  []  See Discharge Summary         Progress towards goals / Updated goals:  Short Term Goals: STG- To be accomplished in 5 visits(s):  1. Pt will be compliant with HEP to encourage prophylaxis. Eval:dispensed, to be updated   Current: 1/6/23 updated this session      2. Pt will improve trunk rotation to 17 in bilaterally to indicate mobility needed for gait. Eval: Right: 20 in         Left: 19.5 in    Current: Progressing 1/6/23 18 in bilaterally      Long Term Goals: LTG- To be accomplished in 24 visit(s):  1. Pt will improve hip adduction drop to negative with HEP to indicate femoral acetabular mobility needed for gait. Eval:positive bilaterally      2. Pt will be able to walk >1 mile without left LE or back pain to increase tolerance to daily activities. Eval:pain within 1 mile      3. Pt increase left LE strength to within 5 pounds of right leg to increase tolerance to work and recrational activites and improve left knee stability. Eval:  Strength Right Left   Quad Set strong Strong    Hip Flex 27 pounds 30 pounds   Knee Ext 70 pounds  60 pounds   Hamstrings 40 pounds 25 pounds   **tested with Handheld dynamometer      4. Pt FOTO score will increase by 5 points to show improvement in function. Eval:67  Current: will address at visit 5  *FOTO score is an established functional score where 100 = no disability*     5. Pt will improve left Knee AROM to equal that of right to increase tolerance to daily activities.   Eval   AROM Right Left   Knee Ext - Flex  0-128 1-119*   **Noted very restricted great toe ext on left  Current: 1/6/23 progressing AROM Left Knee Flexion: 122*    PLAN  []  Upgrade activities as tolerated     [x]  Continue plan of care  []  Update interventions per flow sheet       []  Discharge due to:_  []  Other:_      Linnea Mitchell PT, DPT 1/6/2023  8:50 AM    Future Appointments   Date Time Provider Quincy Mari   1/9/2023  7:30 AM ELIZABETH Zuniga [facility]   1/11/2023  8:30 AM ELIZABETH ZunigaHPHOLLI THE [facility]   1/17/2023  9:00 AM Yoko WilliamHPHOLLI THE [facility] 1/19/2023  7:00 AM Nena Kovacs PT, SANDERT MIHPHOLLI THE [facility]   1/25/2023  8:30 AM ELIZABETH Garcia THE [facility]

## 2023-01-09 ENCOUNTER — HOSPITAL ENCOUNTER (OUTPATIENT)
Dept: PHYSICAL THERAPY | Age: 73
Discharge: HOME | End: 2023-01-09
Payer: MEDICARE

## 2023-01-09 PROCEDURE — 97140 MANUAL THERAPY 1/> REGIONS: CPT

## 2023-01-09 PROCEDURE — 97530 THERAPEUTIC ACTIVITIES: CPT

## 2023-01-09 PROCEDURE — 97112 NEUROMUSCULAR REEDUCATION: CPT

## 2023-01-09 NOTE — PROGRESS NOTES
PT DAILY TREATMENT NOTE    Patient Name: Jalen Booth  Date:2023  : 1950  [x]  Patient  Verified  Payor: Bambi Diane / Plan: [place] MEDICARE PART A & B / Product Type: Medicare /    In time:7:30  Out time:8:11  Total Treatment Time (min): 41  Total Timed Codes (min): 41  1:1 Treatment Time (MC/BCBS only): 41   Visit #: 3 of 24    Treatment Dx: Other low back pain [M54.59]  Left knee pain [M25.562]    SUBJECTIVE  Pain Level (0-10 scale): 3  Any medication changes, allergies to medications, adverse drug reactions, diagnosis change, or new procedure performed?: [x] No    [] Yes (see summary sheet for update)  Subjective functional status/changes:   [] No changes reported  Reports that he always feels the knee when he walks. Questions how he can get the knee straight.     OBJECTIVE    5  NC min Therapeutic Exercise:  [] See flow sheet :   Rationale: increase ROM, increase strength, improve coordination, and increase proprioception to improve the patients ability to perform daily activities with decreased pain and symptom levels        10 min Therapeutic Activity:  []  See flow sheet :   Rationale: increase ROM, increase strength, improve coordination, and increase proprioception  to improve the patients ability to perform daily activities with decreased pain and symptom levels        16 min Neuromuscular Re-education:  []  See flow sheet :   Rationale: increase ROM, increase strength, improve coordination, and increase proprioception  to improve the patients ability to perform daily activities with decreased pain and symptom levels        10 min Manual Therapy:  KORI AIC correction, Sternal mobilization with active pelvic tilt, Superior T4 (KORI technique), Right subclavius release (KORI technique), left rib mobs; KORI infraclavicular rib pump with active breath     Posterior femural grade III mobs, ER tibial grade III mobs, PROM knee extension to improve knee extension    Obtained consent for hand placement Rationale: decrease pain, increase ROM, and increase tissue extensibility to improve the patients ability to perform daily activities with decreased pain and symptom levels     The manual therapy interventions were performed at a separate and distinct time from the therapeutic activities interventions. With   [x] TE   [x] TA   [x] neuro   [] other: Patient Education: [x] Review HEP    [] Progressed/Changed HEP based on:   [x] positioning   [x] body mechanics   [x] transfers   [] heat/ice application    [x] other: Educated pt on gait training including performing heel strike, toe off, reciprocal arm swing, feeling left inner heel, and trunk rotation. Other Objective/Functional Measures:   KORI Special Tests (pre/post)  HGIR:  Right: 70/75 Left: 90/90  Hip Add Drop Test:   Right: positive/negative   Left:positive/midline  Trunk Rot:  Right: 19 in/18   Left 19 in  /18.5                 Left Knee AROM Flexion:122 degrees, extension: lacking 4 degrees        Pain Level (0-10 scale) post treatment: 1     ASSESSMENT/Changes in Function: Patient tolerated therapy session well as there were no adverse reactions today. Pt initially feeling left quad with performing standing left posterior hip, and required significant tactile cuing to engage hamstrings. Pt continues to require tactile and verbal cuing to engage hamstrings in the 90-90 position. Pt fatigued in the right glute with glute max technique. Pt is progressing with therapy as indicated by pt tolerating increase in exercise repetitions and resistance. Although showing progress patient would benefit from continuation of skilled physical therapy to address the remaining limitations.         Patient will continue to benefit from skilled PT services to modify and progress therapeutic interventions, address functional mobility deficits, address ROM deficits, address strength deficits, analyze and address soft tissue restrictions, analyze and cue movement patterns, analyze and modify body mechanics/ergonomics, assess and modify postural abnormalities, and instruct in home and community integration to attain remaining goals. [x]  See Plan of Care  []  See progress note/recertification  []  See Discharge Summary         Progress towards goals / Updated goals:  Short Term Goals: STG- To be accomplished in 5 visits(s):  1. Pt will be compliant with HEP to encourage prophylaxis. Eval:dispensed, to be updated   Current: 1/9/23 reviewed     2. Pt will improve trunk rotation to 17 in bilaterally to indicate mobility needed for gait. Eval: Right: 20 in         Left: 19.5 in    Current: 1/9/23 Trunk Rot:  Right: 19 in/18   Left 19 in  /18. 5-progressing     Long Term Goals: LTG- To be accomplished in 24 visit(s):  1. Pt will improve hip adduction drop to negative with HEP to indicate femoral acetabular mobility needed for gait. Eval:positive bilaterally   Current: 1/9/23 Hip Add Drop Test:   Right: positive/negative   Left:positive/midline     2. Pt will be able to walk >1 mile without left LE or back pain to increase tolerance to daily activities. Eval:pain within 1 mile   Current: 1/9/23 reports he feels the knee with walking. 3.  Pt increase left LE strength to within 5 pounds of right leg to increase tolerance to work and recrational activites and improve left knee stability. Eval:  Strength Right Left   Quad Set strong Strong    Hip Flex 27 pounds 30 pounds   Knee Ext 70 pounds  60 pounds   Hamstrings 40 pounds 25 pounds   **tested with Handheld dynamometer   Current: 1/9/ 23 not assessed     4. Pt FOTO score will increase by 5 points to show improvement in function. Eval:67  Current: 1/9/23 will address at visit 5  *FOTO score is an established functional score where 100 = no disability*     5. Pt will improve left Knee AROM to equal that of right to increase tolerance to daily activities.   Eval   AROM Right Left   Knee Ext - Flex  0-128 1-119*   **Noted very restricted great toe ext on left  Current: 1/9/23 Left Knee AROM Flexion:122 degrees, extension: lacking 4 degrees    PLAN  []  Upgrade activities as tolerated     [x]  Continue plan of care  []  Update interventions per flow sheet       []  Discharge due to:_  []  Other:_      Mounika Weir, PT, DPT, [facility] 1/9/2023  7:35 AM    Future Appointments   Date Time Provider Quincy Mari   1/11/2023  8:30 AM Addi Story PT [facility]   1/17/2023  9:00 AM Fly William [facility]   1/19/2023  7:00 AM Rebeca Melgoza, PT, DPT [facility]   1/25/2023  8:30 AM Addi Story PT [facility]

## 2023-01-11 ENCOUNTER — HOSPITAL ENCOUNTER (OUTPATIENT)
Dept: PHYSICAL THERAPY | Age: 73
Discharge: HOME | End: 2023-01-11
Payer: MEDICARE

## 2023-01-11 PROCEDURE — 97140 MANUAL THERAPY 1/> REGIONS: CPT

## 2023-01-11 PROCEDURE — 97112 NEUROMUSCULAR REEDUCATION: CPT

## 2023-01-11 NOTE — PROGRESS NOTES
PT DAILY TREATMENT NOTE    Patient Name: Nicki Jimenez  Date:2023  : 1950  [x]  Patient  Verified  Payor: Doug Munoz / Plan: [place] MEDICARE PART A & B / Product Type: Medicare /    In time:8:31 am   Out time:9:22 am  Total Treatment Time (min): 51  Total Timed Codes (min): 51  1:1 Treatment Time (MC/BCBS only): 35   Visit #: 4 of 24    Treatment Dx: Other low back pain [M54.59]  Left knee pain [M25.562]    SUBJECTIVE  Pain Level (0-10 scale): 4  Any medication changes, allergies to medications, adverse drug reactions, diagnosis change, or new procedure performed?: [x] No    [] Yes (see summary sheet for update)  Subjective functional status/changes:   [] No changes reported  \"It (knee) is just stiff. It is not right. \"  Indicated posterior left knee    OBJECTIVE    8 min Therapeutic Exercise:  [x] See flow sheet :   Rationale: increase ROM, increase strength, improve coordination, and increase proprioception to improve the patients ability to perform daily activities with decreased pain and symptom levels      12 min Therapeutic Activity:  [x]  See flow sheet :   Rationale: increase ROM, increase strength, improve coordination, and increase proprioception  to improve the patients ability to perform daily activities with decreased pain and symptom levels       23 min Neuromuscular Re-education:  [x]  See flow sheet :   Rationale: increase ROM, increase strength, improve coordination, and increase proprioception  to improve the patients ability to perform daily activities with decreased pain and symptom levels      8 min Manual Therapy:  Left knee - tibial IR with flexion   Posterior tib abd fib glides  Manual stretching of left posterior hip capsule    Rationale: decrease pain, increase ROM, increase tissue extensibility, decrease trigger points, and increase postural awareness to improve the patients ability to perform daily activities with decreased pain and symptom levels    The manual therapy interventions were performed at a separate and distinct time from the therapeutic activities interventions. With   [] TE   [] TA   [] neuro   [] other: Patient Education: [x] Review HEP    [] Progressed/Changed HEP based on:   [] positioning   [] body mechanics   [] transfers   [] heat/ice application    [] other:      Other Objective/Functional Measures:   No left tibial IR or ER  Mild improvement in pain with tibial IR mobilization   Left Knee AROM Flexion: 122* - stiffness end range    Pain Level (0-10 scale) post treatment: 2    ASSESSMENT/Changes in Function:   Tolerated session well. Very TTP at posterior left hip. Continued audible adjustments with knee ROM question hardware and prosthetic but unsure if that is the cause. Discussed role of gait mechanics - pt significantly lacks left toe off and has marked left toe out - positioning entire kinetic chain in ER. Overall responded well to interventions and decreased pain post session. Improved carryover with 90-90s and hamstring facilitation. Patient will continue to benefit from skilled PT services to modify and progress therapeutic interventions, address functional mobility deficits, address ROM deficits, address strength deficits, analyze and address soft tissue restrictions, analyze and cue movement patterns, analyze and modify body mechanics/ergonomics, assess and modify postural abnormalities, and instruct in home and community integration to attain remaining goals. [x]  See Plan of Care  []  See progress note/recertification  []  See Discharge Summary         Progress towards goals / Updated goals:  Short Term Goals: STG- To be accomplished in 5 visits(s):  1. Pt will be compliant with HEP to encourage prophylaxis. Eval:dispensed, to be updated   Current: 1/11/23 reviewed and updated      2. Pt will improve trunk rotation to 17 in bilaterally to indicate mobility needed for gait.   Eval: Right: 20 in         Left: 19.5 in    Current: 1/9/23 Trunk Rot:  Right: 19 in/18   Left 19 in  /18. 5-progressing     Long Term Goals: LTG- To be accomplished in 24 visit(s):  1. Pt will improve hip adduction drop to negative with HEP to indicate femoral acetabular mobility needed for gait. Eval:positive bilaterally   Current: 1/9/23 Hip Add Drop Test:   Right: positive/negative   Left:positive/midline     2. Pt will be able to walk >1 mile without left LE or back pain to increase tolerance to daily activities. Eval:pain within 1 mile   Current: 1/9/23 reports he feels the knee with walking. 3.  Pt increase left LE strength to within 5 pounds of right leg to increase tolerance to work and recrational activites and improve left knee stability. Eval:  Strength Right Left   Quad Set strong Strong    Hip Flex 27 pounds 30 pounds   Knee Ext 70 pounds  60 pounds   Hamstrings 40 pounds 25 pounds   **tested with Handheld dynamometer   Current: 1/9/ 23 not assessed     4. Pt FOTO score will increase by 5 points to show improvement in function. Eval:67  Current: 1/9/23 will address at visit 5  *FOTO score is an established functional score where 100 = no disability*     5. Pt will improve left Knee AROM to equal that of right to increase tolerance to daily activities.   Eval   AROM Right Left   Knee Ext - Flex  0-128 1-119*   **Noted very restricted great toe ext on left  Current: 1/9/23 Left Knee AROM Flexion:122 degrees, extension: lacking 4 degrees       PLAN  []  Upgrade activities as tolerated     [x]  Continue plan of care  []  Update interventions per flow sheet       []  Discharge due to:_  []  Other:_      Hans Morales, PT, DPT 1/11/2023  9:12 AM    Future Appointments   Date Time Provider Quincy Correai   1/17/2023  9:00 AM Emmy William [facility]   1/19/2023  7:00 AM Susan Salas, PT, DPT [facility]   1/25/2023  8:30 AM Aminta Meneses, PT [facility]

## 2023-01-17 ENCOUNTER — HOSPITAL ENCOUNTER (OUTPATIENT)
Dept: PHYSICAL THERAPY | Age: 73
Discharge: HOME | End: 2023-01-17
Payer: MEDICARE

## 2023-01-17 PROCEDURE — 97112 NEUROMUSCULAR REEDUCATION: CPT

## 2023-01-17 PROCEDURE — 97140 MANUAL THERAPY 1/> REGIONS: CPT

## 2023-01-17 PROCEDURE — 97530 THERAPEUTIC ACTIVITIES: CPT

## 2023-01-17 PROCEDURE — 97110 THERAPEUTIC EXERCISES: CPT

## 2023-01-17 NOTE — PROGRESS NOTES
PT DAILY TREATMENT NOTE    Patient Name: Goldie Chauhan  Date:2023  : 1950  [x]  Patient  Verified  Payor: Jimbo Sim / Plan: [place] MEDICARE PART A & B / Product Type: Medicare /    In time:852  Out time:945  Total Treatment Time (min): 53  Total Timed Codes (min): 53  1:1 Treatment Time (MC/BCBS only): 53  Visit #: 5 of 24    Treatment Dx: Other low back pain [M54.59]  Left knee pain [M25.562]    SUBJECTIVE  Pain Level (0-10 scale): 0  Any medication changes, allergies to medications, adverse drug reactions, diagnosis change, or new procedure performed?: [x] No    [] Yes (see summary sheet for update)  Subjective functional status/changes:   [] No changes reported  \"No pain just stiff this morning. Still have the numbness too. \"    OBJECTIVE      10 min Therapeutic Exercise:  [x] See flow sheet :   Rationale: increase ROM and increase strength to improve the patients ability to perform daily activities with decreased pain and symptom levels    20 min Therapeutic Activity:  [x]  See flow sheet :   Rationale: increase ROM, increase strength, improve coordination, and increase proprioception  to improve the patients ability to perform daily activities with decreased pain and symptom levels     15 min Neuromuscular Re-education:  [x]  See flow sheet :   Rationale: increase strength, improve coordination, and increase proprioception  to improve the patients ability to perform daily activities with decreased pain and symptom levels    8 min Manual Therapy:  manual right illiac depression and left posterior hip stretch in left s/l   Rationale: decrease pain, increase ROM, and increase tissue extensibility to improve the patients ability to perform daily activities with decreased pain and symptom levels  The manual therapy interventions were performed at a separate and distinct time from the therapeutic activities interventions.           With   [] TE   [] TA   [] neuro   [] other: Patient Education: [x] Review HEP    [] Progressed/Changed HEP based on:   [] positioning   [] body mechanics   [] transfers   [] heat/ice application    [] other:      Other Objective/Functional Measures:   FOTO 67   2-124* left knee AROM    Pain Level (0-10 scale) post treatment: 0    ASSESSMENT/Changes in Function: Pt tolerated session well with no adverse reactions post session. Improved left knee extension noted post interventions today. Pt responding well to left posterior hip shift activities with reporting adductor fatigue. Consistent cues still needed to decrease ankle and foot ER compensation with interventions. Decreased coordination noted with walking backwards in clinic. Patient will continue to benefit from skilled PT services to modify and progress therapeutic interventions, address functional mobility deficits, address ROM deficits, address strength deficits, analyze and address soft tissue restrictions, analyze and cue movement patterns, analyze and modify body mechanics/ergonomics, assess and modify postural abnormalities, address imbalance/dizziness, and instruct in home and community integration to attain remaining goals. [x]  See Plan of Care  []  See progress note/recertification  []  See Discharge Summary         Progress towards goals / Updated goals:  Short Term Goals: STG- To be accomplished in 5 visits(s):  1. Pt will be compliant with HEP to encourage prophylaxis. Eval:dispensed, to be updated   Current: 1/11/23 reviewed and updated      2. Pt will improve trunk rotation to 17 in bilaterally to indicate mobility needed for gait. Eval: Right: 20 in         Left: 19.5 in    Current: 1/9/23 Trunk Rot:  Right: 19 in/18   Left 19 in  /18. 5-progressing     Long Term Goals: LTG- To be accomplished in 24 visit(s):  1. Pt will improve hip adduction drop to negative with HEP to indicate femoral acetabular mobility needed for gait.   Eval:positive bilaterally   Current: 1/9/23 Hip Add Drop Test:   Right: positive/negative   Left:positive/midline     2. Pt will be able to walk >1 mile without left LE or back pain to increase tolerance to daily activities. Eval:pain within 1 mile   Current: 1/9/23 reports he feels the knee with walking. 3.  Pt increase left LE strength to within 5 pounds of right leg to increase tolerance to work and recrational activites and improve left knee stability. Eval:  Strength Right Left   Quad Set strong Strong    Hip Flex 27 pounds 30 pounds   Knee Ext 70 pounds  60 pounds   Hamstrings 40 pounds 25 pounds   **tested with Handheld dynamometer   Current: 1/9/ 23 not assessed     4. Pt FOTO score will increase by 5 points to show improvement in function. Eval:67  Current: 67 no change 1/17/23  *FOTO score is an established functional score where 100 = no disability*     5. Pt will improve left Knee AROM to equal that of right to increase tolerance to daily activities.   Eval   AROM Right Left   Knee Ext - Flex  0-128 1-119*   **Noted very restricted great toe ext on left  Current: 2-124* pain at end range flexion progressing 1/17/23          PLAN  []  Upgrade activities as tolerated     [x]  Continue plan of care  []  Update interventions per flow sheet       []  Discharge due to:_  []  Other:_      Ti William 1/17/2023  8:38 AM    Future Appointments   Date Time Provider Quincy Mari   1/17/2023  9:00 AM Ti William [facility]   1/19/2023  7:00 AM Gurmeet Murrieta PT, DPT [facility]   1/25/2023  8:30 AM Celeste Watt PT [facility]

## 2023-01-19 ENCOUNTER — HOSPITAL ENCOUNTER (OUTPATIENT)
Dept: PHYSICAL THERAPY | Age: 73
Discharge: HOME | End: 2023-01-19
Payer: MEDICARE

## 2023-01-19 PROCEDURE — 97140 MANUAL THERAPY 1/> REGIONS: CPT

## 2023-01-19 PROCEDURE — 97112 NEUROMUSCULAR REEDUCATION: CPT

## 2023-01-19 PROCEDURE — 97110 THERAPEUTIC EXERCISES: CPT

## 2023-01-19 PROCEDURE — 97530 THERAPEUTIC ACTIVITIES: CPT

## 2023-01-19 NOTE — PROGRESS NOTES
DAILY TREATMENT NOTE    Patient Name: Annie Ernst  Date:2023  : 1950  [x]  Patient  Verified  Payor: Silvana Hazard / Plan: [place] / Product Type: Medicare /    In time:6:59 am  Out time:7:54 am  Total Treatment Time (min): 55  Total Timed Codes (min): 55  1:1 Treatment Time (MC/BCBS only): 55   Visit #: 6 of 24    Treatment Dx: Other low back pain [M54.59]  Left knee pain [M25.562]    SUBJECTIVE  Pain Level (0-10 scale): 2  Any medication changes, allergies to medications, adverse drug reactions, diagnosis change, or new procedure performed?: [x] No    [] Yes (see summary sheet for update)  Subjective functional status/changes:   [] No changes reported  \"Better. Just a little numbness on the th inside of the left ankle. \"  Reports compliance with HEP to date    OBJECTIVE    8 min Therapeutic Exercise:  [x] See flow sheet :   Rationale: increase ROM, increase strength, improve coordination, and increase proprioception to improve the patients ability to perform daily activities with decreased pain and symptom levels      12 min Therapeutic Activity:  [x]  See flow sheet :   Rationale: increase ROM, increase strength, improve coordination, improve balance, and increase proprioception  to improve the patients ability to perform daily activities with decreased pain and symptom levels       25 min Neuromuscular Re-education:  [x]  See flow sheet :   Rationale: increase ROM, increase strength, improve coordination, improve balance, and increase proprioception  to improve the patients ability to perform daily activities with decreased pain and symptom levels      10 min Manual Therapy:  KORI AIC correction, Sternal mobilization with active pelvic tilt, Superior T4 (KORI technique), Right subclavius release (KORI technique), KORI infraclavicular rib pump with active breath   Manual stretching of left posterior hip capsule in right S/L   Obtained consent for hand placement      Rationale: decrease pain, increase ROM, increase tissue extensibility, decrease trigger points, and increase postural awareness to improve the patients ability to perform daily activities with decreased pain and symptom levels  The manual therapy interventions were performed at a separate and distinct time from the therapeutic activities interventions. With   [] TE   [] TA   [] neuro   [] other: Patient Education: [x] Review HEP    [] Progressed/Changed HEP based on:   [] positioning   [] body mechanics   [] transfers   [] heat/ice application    [] other:      Other Objective/Functional Measures:   KORI Special Tests (pre/post)  HGIR:                                                 Right: 65/85             Left: 80/90  Hip Add Drop Test:                           Right: +/-            Left:+/midline   Passive Abduction Raise:   Right: +  Left: +  Trunk Rot                                           Right: 19 in/17 in Left 19 in  /17 in   Apical Ext Test:                                  Right: dcr             Left: dcr    Gait: continued marked left toe out - challenged with toe off     Pain Level (0-10 scale) post treatment: 0    ASSESSMENT/Changes in Function:   Added left stance step thru to address left posterior hip and need for left AF IR with gait. Very challenged with left stance and toe off. Emphasized reference center of left medial heel;. Added sled push. , eccentric leg press and ju leg ext with good tolerance. Provided pt education regarding need for repositioning vs stretching. Patient will continue to benefit from skilled PT services to modify and progress therapeutic interventions, address functional mobility deficits, address ROM deficits, address strength deficits, analyze and address soft tissue restrictions, analyze and cue movement patterns, analyze and modify body mechanics/ergonomics, assess and modify postural abnormalities, and instruct in home and community integration to attain remaining goals. [x]  See Plan of Care  []  See progress note/recertification  []  See Discharge Summary         Progress towards goals / Updated goals:  Short Term Goals: STG- To be accomplished in 5 visits(s):  1. Pt will be compliant with HEP to encourage prophylaxis. Eval:dispensed, to be updated   Current: 1/19/23 reviewed and reports compliance      2. Pt will improve trunk rotation to 17 in bilaterally to indicate mobility needed for gait. Eval: Right: 20 in         Left: 19.5 in    Current: 1/19/23 Progressing/Partially MET improved to 17 in bilaterally post session      Long Term Goals: LTG- To be accomplished in 24 visit(s):  1. Pt will improve hip adduction drop to negative with HEP to indicate femoral acetabular mobility needed for gait. Eval:positive bilaterally   Current: 1/9/23 Hip Add Drop Test:   Right: positive/negative   Left:positive/midline     2. Pt will be able to walk >1 mile without left LE or back pain to increase tolerance to daily activities. Eval:pain within 1 mile   Current: 1/9/23 reports he feels the knee with walking. 3.  Pt increase left LE strength to within 5 pounds of right leg to increase tolerance to work and recrational activites and improve left knee stability. Eval:  Strength Right Left   Quad Set strong Strong    Hip Flex 27 pounds 30 pounds   Knee Ext 70 pounds  60 pounds   Hamstrings 40 pounds 25 pounds   **tested with Handheld dynamometer   Current: 1/9/ 23 not assessed     4. Pt FOTO score will increase by 5 points to show improvement in function. Eval:67  Current: 67 no change 1/17/23  *FOTO score is an established functional score where 100 = no disability*     5. Pt will improve left Knee AROM to equal that of right to increase tolerance to daily activities.   Eval   AROM Right Left   Knee Ext - Flex  0-128 1-119*   **Noted very restricted great toe ext on left  Current: 2-124* pain at end range flexion progressing 1/17/23       PLAN  []  Upgrade activities as tolerated     [x]  Continue plan of care  []  Update interventions per flow sheet       []  Discharge due to:_  []  Other:_      Gaby May, PT, DPT 1/19/2023  7:30 AM    Future Appointments   Date Time Provider Quincy Mari   1/25/2023  8:30 AM Kaitlin Munoz PT [facility]

## 2023-01-25 ENCOUNTER — HOSPITAL ENCOUNTER (OUTPATIENT)
Dept: PHYSICAL THERAPY | Age: 73
Discharge: HOME | End: 2023-01-25
Payer: MEDICARE

## 2023-01-25 PROCEDURE — 97112 NEUROMUSCULAR REEDUCATION: CPT

## 2023-01-25 PROCEDURE — 97140 MANUAL THERAPY 1/> REGIONS: CPT

## 2023-01-25 PROCEDURE — 97530 THERAPEUTIC ACTIVITIES: CPT

## 2023-01-25 NOTE — PROGRESS NOTES
PT DAILY TREATMENT NOTE    Patient Name: King Epley  Date:2023  : 1950  [x]  Patient  Verified  Payor: Stephanie Smallwood / Plan: [place] MEDICARE PART A & B / Product Type: Medicare /    In time:8:33  Out time:9:15  Total Treatment Time (min): 42  Total Timed Codes (min): 42  1:1 Treatment Time (MC/BCBS only): 38   Visit #: 7 of 24    Treatment Dx: Other low back pain [M54.59]  Left knee pain [M25.562]    SUBJECTIVE  Pain Level (0-10 scale): 1  Any medication changes, allergies to medications, adverse drug reactions, diagnosis change, or new procedure performed?: [x] No    [] Yes (see summary sheet for update)  Subjective functional status/changes:   [] No changes reported  Reports that the knee has some stiffness. Reports that the knee tires a little bit with his walking.      OBJECTIVE    4  NC min Therapeutic Exercise:  [x] See flow sheet :   Rationale: increase ROM, increase strength, improve coordination, and increase proprioception to improve the patients ability to perform daily activities with decreased pain and symptom levels        10 min Therapeutic Activity:  [x]  See flow sheet :   Rationale: increase ROM, increase strength, improve coordination, improve balance, and increase proprioception  to improve the patients ability to perform daily activities with decreased pain and symptom levels        20 min Neuromuscular Re-education:  [x]  See flow sheet :   Rationale: increase ROM, increase strength, improve coordination, improve balance, and increase proprioception  to improve the patients ability to perform daily activities with decreased pain and symptom levels        8 min Manual Therapy:  KORI AIC correction, Sternal mobilization with active pelvic tilt, Superior T4 (KORI technique), Right subclavius release (KORI technique), KORI infraclavicular rib pump with active breath     Obtained consent for hand placement       Rationale: decrease pain, increase ROM, increase tissue extensibility, decrease trigger points, and increase postural awareness to improve the patients ability to perform daily activities with decreased pain and symptom levels  The manual therapy interventions were performed at a separate and distinct time from the therapeutic activities interventions. With   [x] TE   [x] TA   [x] neuro   [] other: Patient Education: [x] Review HEP    [] Progressed/Changed HEP based on:   [] positioning   [] body mechanics   [] transfers   [] heat/ice application    [x] other: Educated pt on gait training including performing heel strike, great toe off during push off phase, reciprocal arm swing, feeling left inner heel, and trunk rotation. Other Objective/Functional Measures:   KORI Special Tests (pre/post)  HGIR:  Right: NT/85  Left: NT/90  Hip Add Drop Test:    Right: NT Left:NT   Trunk Rot    Right: 20.5 in/17.5 in     Left 19 in  /18 in       Pain Level (0-10 scale) post treatment: 0     ASSESSMENT/Changes in Function: Patient tolerated therapy session well as there were no adverse reactions today. Pt with inc in stiffness this therapy session, but felt better after therapy session. Pt does require cuing with engaging left abs with cross over glute. Pt feeling fatigued with knee extension on the [place]. Pt did well with engaging  Pt is progressing with therapy as indicated by pt tolerating increase in exercise repetitions and resistance. Although showing progress patient would benefit from continuation of skilled physical therapy to address the remaining limitations.         Patient will continue to benefit from skilled PT services to modify and progress therapeutic interventions, address functional mobility deficits, address ROM deficits, address strength deficits, analyze and address soft tissue restrictions, analyze and cue movement patterns, analyze and modify body mechanics/ergonomics, assess and modify postural abnormalities, and instruct in home and community integration to attain remaining goals. [x]  See Plan of Care  []  See progress note/recertification  []  See Discharge Summary         Progress towards goals / Updated goals:  Short Term Goals: STG- To be accomplished in 5 visits(s):  1. Pt will be compliant with HEP to encourage prophylaxis. Eval:dispensed, to be updated   Current: 1/25/23 reports that he is doing some of the HEP, not as Amish as he should be     2. Pt will improve trunk rotation to 17 in bilaterally to indicate mobility needed for gait. Eval: Right: 20 in         Left: 19.5 in    Current: 1/2523 Trunk Rot    Right: 20.5 in/17.5 in     Left 19 in  /18 in      Long Term Goals: LTG- To be accomplished in 24 visit(s):  1. Pt will improve hip adduction drop to negative with HEP to indicate femoral acetabular mobility needed for gait. Eval:positive bilaterally   Current: 1/23/23 Hip Add Drop Test:    Right: NT Left:NT      2. Pt will be able to walk >1 mile without left LE or back pain to increase tolerance to daily activities. Eval:pain within 1 mile   Current: 1/25/23 Reports that the knee tires a little bit with his walking. 3.  Pt increase left LE strength to within 5 pounds of right leg to increase tolerance to work and recrational activites and improve left knee stability. Eval:  Strength Right Left   Quad Set strong Strong    Hip Flex 27 pounds 30 pounds   Knee Ext 70 pounds  60 pounds   Hamstrings 40 pounds 25 pounds   **tested with Handheld dynamometer   Current: 1/25/ 23 not assessed     4. Pt FOTO score will increase by 5 points to show improvement in function. Eval:67  Current: 1/25/23: performed on 1/17/23 67 no change   *FOTO score is an established functional score where 100 = no disability*     5. Pt will improve left Knee AROM to equal that of right to increase tolerance to daily activities.   Eval   AROM Right Left   Knee Ext - Flex  0-128 1-119*   **Noted very restricted great toe ext on left  Current: 1/25/23 not assessed    PLAN  []  Upgrade activities as tolerated     [x]  Continue plan of care  []  Update interventions per flow sheet       []  Discharge due to:_  []  Other:_      Jason Cantor PT, DPT, [facility]IM[facility] 1/25/2023  7:36 AM    Future Appointments   Date Time Provider Quincy Mari   1/25/2023  8:30 AM Sujata Sujit, PT [facility]   2/1/2023  8:00 AM Alden Homans, PT, DPT [facility]   2/3/2023  7:30 AM Sujata Sujit, PT [facility]   2/6/2023  8:00 AM Sujata Sujit, PT [facility]   2/8/2023  7:30 AM Alden Homans, PT, DPT [facility]   2/15/2023  7:30 AM Alden Homans, PT, DPT [facility]   2/17/2023  7:30 AM Sujata Sujit, PT [facility]   2/22/2023  7:30 AM Alden Homans, PT, DPT [facility]   2/24/2023  7:30 AM Sujata Sujit, PT [facility]   2/27/2023  9:00 AM Sujata Sujit, PT [facility]

## 2023-02-01 ENCOUNTER — HOSPITAL ENCOUNTER (OUTPATIENT)
Dept: PHYSICAL THERAPY | Age: 73
Discharge: HOME | End: 2023-02-01
Payer: MEDICARE

## 2023-02-01 PROCEDURE — 97112 NEUROMUSCULAR REEDUCATION: CPT

## 2023-02-01 PROCEDURE — 97530 THERAPEUTIC ACTIVITIES: CPT

## 2023-02-01 NOTE — PROGRESS NOTES
PT DAILY TREATMENT NOTE    Patient Name: Mary Vu  Date:2023  : 1950  [x]  Patient  Verified  Payor: Stalin Iraheta / Plan: [place] MEDICARE PART A & B / Product Type: Medicare /    In time:7:50 am  Out time:8:35 am  Total Treatment Time (min): 45  Total Timed Codes (min): 45  1:1 Treatment Time (MC/BCBS only): 45   Visit #: 8 of 24    Treatment Dx: Other low back pain [M54.59]  Left knee pain [M25.562]    SUBJECTIVE  Pain Level (0-10 scale): 0-1  Any medication changes, allergies to medications, adverse drug reactions, diagnosis change, or new procedure performed?: [x] No    [] Yes (see summary sheet for update)  Subjective functional status/changes:   [] No changes reported  \"Better I can feel better. \"  Reported that can still feel posterior knee tightness, intermittent numbness medial left ankle but anterior tib is feeling much better.      OBJECTIVE    15 min Therapeutic Activity:  [x]  See flow sheet :   Rationale: increase ROM, increase strength, improve coordination, and increase proprioception  to improve the patients ability to perform daily activities with decreased pain and symptom levels       30 min Neuromuscular Re-education:  [x]  See flow sheet :   Rationale: increase ROM, increase strength, improve coordination, improve balance, and increase proprioception  to improve the patients ability to perform daily activities with decreased pain and symptom levels          With   [] TE   [] TA   [] neuro   [] other: Patient Education: [x] Review HEP    [] Progressed/Changed HEP based on:   [] positioning   [] body mechanics   [] transfers   [] heat/ice application    [] other:      Other Objective/Functional Measures:   KORI Special Tests:  Hip Add Drop Test:                           Right:-            Left:-  Trunk Rot                                           Right: 16.5 in    Left 16.5in     Strength Left   Quad Set good   Hip Flex 35#   Knee Ext 65#   Hamstrings 30#      Left Knee Ext-Flex: 0-124* (felt posterior knee with end range flexion)    Gait: left foot toe out, decreased pelvic rotation and trunk ext - can improve left toe off with cues     Pain Level (0-10 scale) post treatment: 0    ASSESSMENT/Changes in Function:   Pt has been seen for 8 visits C/C of Left knee pain and lateral calf and medial ankle pain and stiffness. Overall pt is demonstrating good progress. Left anterior tib pain and numbness has almost subsided, reports continued \"sensitivity\" at left posterior tib/medial ankle and posterior left knee. Suspect due to poor gait mechanics and pelvic mechanics. Strength and ROM is improving. Treatment has focussed on thoracic and pelvic repositioning, glut hamstring and ab facilitation as well as general strength and gait mechanics. While py pt is demonstrating progress, could benefit from continued skilled PT to address unmet goals. Patient will continue to benefit from skilled PT services to modify and progress therapeutic interventions, address functional mobility deficits, address ROM deficits, address strength deficits, analyze and address soft tissue restrictions, analyze and cue movement patterns, analyze and modify body mechanics/ergonomics, assess and modify postural abnormalities, address imbalance/dizziness, and instruct in home and community integration to attain remaining goals. [x]  See Plan of Care  []  See progress note/recertification  []  See Discharge Summary         Progress towards goals / Updated goals:  Short Term Goals: STG- To be accomplished in 5 visits(s):  1. Pt will be compliant with HEP to encourage prophylaxis. Eval:dispensed, to be updated   Current: 2/1/23 reports that he is doing some of the HEP, not as Holiness as he should be     2. Pt will improve trunk rotation to 17 in bilaterally to indicate mobility needed for gait.   Eval: Right: 20 in         Left: 19.5 in    Current: 1/2523 Trunk Rot    Right: 20.5 in/17.5 in     Left 19 in  /18 in Long Term Goals: LTG- To be accomplished in 24 visit(s):  1. Pt will improve hip adduction drop to negative with HEP to indicate femoral acetabular mobility needed for gait. Eval:positive bilaterally   Current: 2/1/23 negative bilaterally post session       2. Pt will be able to walk >1 mile without left LE or back pain to increase tolerance to daily activities. Eval:pain within 1 mile   Current: 1/25/23 Reports that the knee tires a little bit with his walking. 3.  Pt increase left LE strength to within 5 pounds of right leg to increase tolerance to work and recrational activites and improve left knee stability. Eval:  Strength Right Left   Quad Set strong Strong    Hip Flex 27 pounds 30 pounds   Knee Ext 70 pounds  60 pounds   Hamstrings 40 pounds 25 pounds   **tested with Handheld dynamometer   Current: Progressing 2/1/23  Strength Left   Quad Set good   Hip Flex 35#   Knee Ext 65#   Hamstrings 30#         4. Pt FOTO score will increase by 5 points to show improvement in function. Eval:67  Current: 1/25/23: performed on 1/17/23 67 no change   *FOTO score is an established functional score where 100 = no disability*     5. Pt will improve left Knee AROM to equal that of right to increase tolerance to daily activities.   Eval   AROM Right Left   Knee Ext - Flex  0-128 1-119*   **Noted very restricted great toe ext on left  Current:progressing 2/1/23   Left Knee AROM Ext-Flex: 0-124* (felt posterior knee discomfort with end range flexion)    PLAN  []  Upgrade activities as tolerated     [x]  Continue plan of care  []  Update interventions per flow sheet       []  Discharge due to:_  []  Other:_      Hans Morales PT, DPT 2/1/2023  8:02 AM    Future Appointments   Date Time Provider Quincy Mari   2/3/2023  7:30 AM Aminta Meneses PT [facility]   2/6/2023  8:00 AM Aminta Meneses PT [facility]   2/8/2023  7:30 AM Susan Salas PT, DPT [facility]   2/15/2023  7:30 AM Susan Salas PT, DPT [facility]   2/17/2023  7:30 AM Savannah Rodriguez PT [facility]   2/22/2023  7:30 AM Abdirashid Ash PT, DPT [facility]   2/24/2023  7:30 AM Savannah Rodriguez PT [facility]   2/27/2023  9:00 AM Savannah Rodriguez PT [facility]

## 2023-02-01 NOTE — PROGRESS NOTES
[facility] at the [address], [place], [address] [facility]  Phone: [phone]      Fax:  [phone]    Progress Note  C/C of Left knee pain and lateral calf and medial ankle pain and stiffness. Visits from Start of Care: 8    Missed Visits: 0    Key Functional Changes:   Hip Add Drop Test:                               Right:-             Left:-  Trunk Rot                                           Right: 16.5 in                         Left 16.5in      Strength Left   Quad Set good   Hip Flex 35#   Knee Ext 65#   Hamstrings 30#                 Left Knee Ext-Flex: 0-124* (felt posterior knee with end range flexion)     Gait: left foot toe out, decreased pelvic rotation and trunk ext - can improve left toe off with cues      Progress Towards Goals:   Short Term Goals: STG- To be accomplished in 5 visits(s):  1. Pt will be compliant with HEP to encourage prophylaxis. Eval:dispensed, to be updated   Current: 2/1/23 reports that he is doing some of the HEP, not as Muslim as he should be     2. Pt will improve trunk rotation to 17 in bilaterally to indicate mobility needed for gait. Eval: Right: 20 in         Left: 19.5 in    Current: 1/2523 Trunk Rot    Right: 20.5 in/17.5 in     Left 19 in  /18 in      Long Term Goals: LTG- To be accomplished in 24 visit(s):  1. Pt will improve hip adduction drop to negative with HEP to indicate femoral acetabular mobility needed for gait. Eval:positive bilaterally   Current: 2/1/23 negative bilaterally post session       2. Pt will be able to walk >1 mile without left LE or back pain to increase tolerance to daily activities. Eval:pain within 1 mile   Current: 1/25/23 Reports that the knee tires a little bit with his walking. 3.  Pt increase left LE strength to within 5 pounds of right leg to increase tolerance to work and recrational activites and improve left knee stability.   Eval:  Strength Right Left   Quad Set strong Strong    Hip Flex 27 pounds 30 pounds   Knee Ext 70 pounds  60 pounds   Hamstrings 40 pounds 25 pounds   **tested with Handheld dynamometer   Current: Progressing 2/1/23  Strength Left   Quad Set good   Hip Flex 35#   Knee Ext 65#   Hamstrings 30#                    4.  Pt FOTO score will increase by 5 points to show improvement in function. Eval:67  Current: 1/25/23: performed on 1/17/23 67 no change   *FOTO score is an established functional score where 100 = no disability*     5. Pt will improve left Knee AROM to equal that of right to increase tolerance to daily activities.   Eval   AROM Right Left   Knee Ext - Flex  0-128 1-119*   **Noted very restricted great toe ext on left  Current:progressing 2/1/23   Left Knee AROM Ext-Flex: 0-124* (felt posterior knee discomfort with end range flexion)      Updated Goals: to be achieved in 8 weeks:   Same as unmet above    ASSESSMENT/RECOMMENDATIONS:  [x]Continue therapy per initial plan/protocol at a frequency of  2 x per week for 8 weeks  []Continue therapy with the following recommended changes:_____________________      _____________________________________________________________________  []Discontinue therapy progressing towards or have reached established goals  []Discontinue therapy due to lack of appreciable progress towards goals  []Discontinue therapy due to lack of attendance or compliance  []Await Physician's recommendations/decisions regarding therapy  []Other:________________________________________________________________    Thank you for this referral.   Linette Scott, PT, DPT 2/1/2023 12:38 PM

## 2023-02-03 ENCOUNTER — HOSPITAL ENCOUNTER (OUTPATIENT)
Dept: PHYSICAL THERAPY | Age: 73
Discharge: HOME | End: 2023-02-03
Payer: MEDICARE

## 2023-02-03 PROCEDURE — 97110 THERAPEUTIC EXERCISES: CPT

## 2023-02-03 PROCEDURE — 97112 NEUROMUSCULAR REEDUCATION: CPT

## 2023-02-03 NOTE — PROGRESS NOTES
PT DAILY TREATMENT NOTE    Patient Name: Ayad Milian  Date:2/3/2023  : 1950  [x]  Patient  Verified  Payor: Olayinka Flanagan / Plan: [place] / Product Type: Medicare /    In time:7:30  Out time:8:06  Total Treatment Time (min): 36  Total Timed Codes (min): 36  1:1 Treatment Time (MC/BCBS only): 36   Visit #: 9 of 24    Treatment Dx: Other low back pain [M54.59]  Left knee pain [M25.562]    SUBJECTIVE  Pain Level (0-10 scale): 0  Any medication changes, allergies to medications, adverse drug reactions, diagnosis change, or new procedure performed?: [x] No    [] Yes (see summary sheet for update)  Subjective functional status/changes:   [] No changes reported  Reports that he just knows its there. Reports that he is happy that the knee has gotten straight. He thought he was going have to live with that forever. OBJECTIVE    15 min Therapeutic Exercise:  [] See flow sheet :   Rationale: increase ROM, increase strength, improve coordination, improve balance, and increase proprioception to improve the patients ability to perform daily activities with decreased pain and symptom levels       21 min Neuromuscular Re-education:  []  See flow sheet :   Rationale: increase ROM, increase strength, improve coordination, improve balance, and increase proprioception  to improve the patients ability to perform daily activities with decreased pain and symptom levels            With   [x] TE   [x] TA   [x] neuro   [] other: Patient Education: [x] Review HEP    [] Progressed/Changed HEP based on:   [x] positioning   [x] body mechanics   [x] transfers   [] heat/ice application    [] other:      Other Objective/Functional Measures: Pt enters gym in no apparent distress.      Trunk Rot    Right: 20 in/NT      Left 19 in  /NT   Adductor drop test: right: positive  left: positive    Pain Level (0-10 scale) post treatment: 0    ASSESSMENT/Changes in Function: Patient tolerated therapy session well as there were no adverse reactions today. Pt found ju knee extension very fatiguing but was able to inc weight with eccentric leg press. Added bench planks with knee to elbow to POC and pt required tactile cuing to maintain pelvic neutral. Pt is progressing with therapy as indicated by pt tolerating increase in exercise repetitions and resistance. Although showing progress patient would benefit from continuation of skilled physical therapy to address the remaining limitations. Patient will continue to benefit from skilled PT services to modify and progress therapeutic interventions, address functional mobility deficits, address ROM deficits, address strength deficits, analyze and address soft tissue restrictions, analyze and cue movement patterns, analyze and modify body mechanics/ergonomics, assess and modify postural abnormalities, address imbalance/dizziness, and instruct in home and community integration to attain remaining goals. [x]  See Plan of Care  []  See progress note/recertification  []  See Discharge Summary         Progress towards goals / Updated goals:  Short Term Goals: STG- To be accomplished in 5 visits(s):  1. Pt will be compliant with HEP to encourage prophylaxis. Eval:dispensed, to be updated   Last PN:  2/1/23 reports that he is doing some of the HEP, not as [employer] as he should be  Current:     2. Pt will improve trunk rotation to 17 in bilaterally to indicate mobility needed for gait. Eval: Right: 20 in         Left: 19.5 in    Last PN: 2/1/23 Trunk Rot                                           Right: 16.5 in                         Left 16.5in   Current: 2/3/23 Trunk Rot    Right: 20 in/NT      Left 19 in  /NT      Long Term Goals: LTG- To be accomplished in 24 visit(s):  1. Pt will improve hip adduction drop to negative with HEP to indicate femoral acetabular mobility needed for gait.   Eval:positive bilaterally   Last PN: 2/1/23 negative bilaterally post session    Current: 2/3/23 positive bilaterally pre session, didn't test post     2. Pt will be able to walk >1 mile without left LE or back pain to increase tolerance to daily activities. Eval:pain within 1 mile   Last PN: 2/1/23 not assessed  Current:        3. Pt increase left LE strength to within 5 pounds of right leg to increase tolerance to work and recrational activites and improve left knee stability. Eval:  Strength Right Left   Quad Set strong Strong    Hip Flex 27 pounds 30 pounds   Knee Ext 70 pounds  60 pounds   Hamstrings 40 pounds 25 pounds   **tested with Handheld dynamometer   Last PN: Progressing 2/1/23  Strength Left   Quad Set good   Hip Flex 35#   Knee Ext 65#   Hamstrings 30#   Current: 2/3/23 fatigued with knee extension                 4.  Pt FOTO score will increase by 5 points to show improvement in function. Eval:67  Last PN: 2/1/23: not assessed  Current:  2/3/23 will perform on 10th visit, next visit   *FOTO score is an established functional score where 100 = no disability*     5. Pt will improve left Knee AROM to equal that of right to increase tolerance to daily activities.   Eval   AROM Right Left   Knee Ext - Flex  0-128 1-119*   **Noted very restricted great toe ext on left  Last PN:progressing 2/1/23   Left Knee AROM Ext-Flex: 0-124* (felt posterior knee discomfort with end range flexion)  Current:    PLAN  []  Upgrade activities as tolerated     [x]  Continue plan of care  []  Update interventions per flow sheet       []  Discharge due to:_  []  Other:_      Yamil Bolaños PT, DPT, [facility] 2/3/2023  7:28 AM    Future Appointments   Date Time Provider [facility] Mari   2/3/2023  7:30 AM Mai Kathleen PT [facility]   2/6/2023  8:00 AM Mai Kathleen PT [facility]   2/8/2023  7:30 AM Mitul Aragon, PT, DPT [facility]   2/15/2023  7:30 AM Mitul Aragon PT, DPT [facility]   2/17/2023  7:30 AM [facility]   2/22/2023  7:30 AM Mitul Aragon, PT, DPT [facility]   2/24/2023  7:30 AM Lazarus Ojeda, [facility]   2/27/2023  9:00 AM Lazarus Ojeda, [facility]

## 2023-02-06 ENCOUNTER — HOSPITAL ENCOUNTER (OUTPATIENT)
Dept: PHYSICAL THERAPY | Age: 73
Discharge: HOME | End: 2023-02-06
Payer: MEDICARE

## 2023-02-06 PROCEDURE — 97530 THERAPEUTIC ACTIVITIES: CPT

## 2023-02-06 PROCEDURE — 97112 NEUROMUSCULAR REEDUCATION: CPT

## 2023-02-06 NOTE — PROGRESS NOTES
PT DAILY TREATMENT NOTE    Patient Name: Chiqui Segundo  Date:2023  : 1950  [x]  Patient  Verified  Payor: Rae Hinton / Plan: [place] MED[place]ARE PART A & B / Product Type: Medicare /    In time:8:00  Out time: 8:45   Total Treatment Time (min): 45  Total Timed Codes (min): 45  1:1 Treatment Time (MC/BCBS only): 32   Visit #: 10 of 24    Treatment Dx: Other low back pain [M54.59]  Left knee pain [M25.562]    SUBJECTIVE  Pain Level (0-10 scale): stiffness  Any medication changes, allergies to medications, adverse drug reactions, diagnosis change, or new procedure performed?: [x] No    [] Yes (see summary sheet for update)  Subjective functional status/changes:   [] No changes reported  Reports that he was lazy this weekend. Reports that he felt it on the outside of the left leg this weekend.      OBJECTIVE    13  NC min Therapeutic Exercise:  [] See flow sheet :   Rationale: increase ROM, increase strength, improve coordination, improve balance, and increase proprioception to improve the patients ability to perform daily activities with decreased pain and symptom levels      10 min Therapeutic Activity:  []  See flow sheet :   Rationale: increase ROM, increase strength, improve coordination, improve balance, and increase proprioception  to improve the patients ability to perform daily activities with decreased pain and symptom levels         22 min Neuromuscular Re-education:  []  See flow sheet :   Rationale: increase ROM, increase strength, improve coordination, improve balance, and increase proprioception  to improve the patients ability to perform daily activities with decreased pain and symptom levels                                                                    With   [x] TE   [x] TA   [x] neuro   [] other: Patient Education: [x] Review HEP    [] Progressed/Changed HEP based on:   [x] positioning   [x] body mechanics   [x] transfers   [] heat/ice application    [x] other: exercises to do before walking, exercises that he can be consistent with, exercises to do at the gym      Other Objective/Functional Measures: Pt enters gym in no apparent distress. Trunk Rot    Right: 19 in/NT      Left 19 in  /NT   Adductor drop test: right: positive  left: positive    FOTO: 63     Pain Level (0-10 scale) post treatment: 0     ASSESSMENT/Changes in Function: Patient tolerated therapy session well as there were no adverse reactions today. Pt reporting feeling outside of the lower leg, but didn't feel any pain at the end of therapy session. Pt requires cuing to mod assistance with sidelying technique for maintaining PPT. Pt is progressing with therapy as indicated by pt tolerating increase in exercise repetitions and resistance. Although showing progress patient would benefit from continuation of skilled physical therapy to address the remaining limitations. Patient will continue to benefit from skilled PT services to modify and progress therapeutic interventions, address functional mobility deficits, address ROM deficits, address strength deficits, analyze and address soft tissue restrictions, analyze and cue movement patterns, analyze and modify body mechanics/ergonomics, assess and modify postural abnormalities, address imbalance/dizziness, and instruct in home and community integration to attain remaining goals. [x]  See Plan of Care  []  See progress note/recertification  []  See Discharge Summary         Progress towards goals / Updated goals:  Short Term Goals: STG- To be accomplished in 5 visits(s):  1. Pt will be compliant with HEP to encourage prophylaxis. Eval:dispensed, to be updated   Last PN:  2/1/23 reports that he is doing some of the HEP, not as [employer] as he should be  Current: 2/6/23 updated per chart     2. Pt will improve trunk rotation to 17 in bilaterally to indicate mobility needed for gait.   Eval: Right: 20 in         Left: 19.5 in    Last PN: 2/1/23 Trunk Rot Right: 16.5 in                         Left 16.5in   Current: 2/6/23 Trunk Rot   Right: 19 in/NT      Left 19 in  /NT      Long Term Goals: LTG- To be accomplished in 24 visit(s):  1. Pt will improve hip adduction drop to negative with HEP to indicate femoral acetabular mobility needed for gait. Eval:positive bilaterally   Last PN: 2/1/23 negative bilaterally post session    Current: 2/6/23 positive bilaterally pre session, didn't test post     2. Pt will be able to walk >1 mile without left LE or back pain to increase tolerance to daily activities. Eval:pain within 1 mile   Last PN: 2/1/23 not assessed  Current: 2/6/23 pt reporting left lateral lower leg pain with walking this weekend       3. Pt increase left LE strength to within 5 pounds of right leg to increase tolerance to work and recrational activites and improve left knee stability. Eval:  Strength Right Left   Quad Set strong Strong    Hip Flex 27 pounds 30 pounds   Knee Ext 70 pounds  60 pounds   Hamstrings 40 pounds 25 pounds   **tested with Handheld dynamometer   Last PN: Progressing 2/1/23  Strength Left   Quad Set good   Hip Flex 35#   Knee Ext 65#   Hamstrings 30#   Current: 2/6/23 not assessed                4.  Pt FOTO score will increase by 5 points to show improvement in function. Eval:67  Last PN: 2/1/23: not assessed  Current:  2/6/23 63 slight regression  *FOTO score is an established functional score where 100 = no disability*     5. Pt will improve left Knee AROM to equal that of right to increase tolerance to daily activities.   Eval   AROM Right Left   Knee Ext - Flex  0-128 1-119*   **Noted very restricted great toe ext on left  Last PN:progressing 2/1/23 Left Knee AROM Ext-Flex: 0-124* (felt posterior knee discomfort with end range flexion)  Current: 2/6/23 not assessed       PLAN  []  Upgrade activities as tolerated     [x]  Continue plan of care  []  Update interventions per flow sheet []  Discharge due to:_  []  Other:_      Yumiko Zimmer, PT, DPDANITZA, [facility] 2/6/2023  7:38 AM    Future Appointments   Date Time Provider [facility] Mari   2/6/2023  8:00 AM Lala Mishra PT [facility]   2/8/2023  7:30 AM July Duarte PT, DPT [facility]   2/15/2023  7:30 AM July Duarte PT, DPT [facility]   2/17/2023  7:30 AM Lala Mishra PT [facility]   2/22/2023  7:30 AM July Duarte PT, DPT [facility]   2/24/2023  7:30 AM Lala Mishra PT [facility]   2/27/2023  9:00 AM Lala Mishra PT [facility]

## 2023-02-08 ENCOUNTER — HOSPITAL ENCOUNTER (OUTPATIENT)
Dept: PHYSICAL THERAPY | Age: 73
Discharge: HOME | End: 2023-02-08
Payer: MEDICARE

## 2023-02-08 PROCEDURE — 97112 NEUROMUSCULAR REEDUCATION: CPT

## 2023-02-08 PROCEDURE — 97530 THERAPEUTIC ACTIVITIES: CPT

## 2023-02-08 NOTE — PROGRESS NOTES
PT DAILY TREATMENT NOTE    Patient Name: Laith Clarke  Date:2023  : 1950  [x]  Patient  Verified  Payor: Saurav Hinojosa / Plan: [place] MEDICARE PART A & B / Product Type: Medicare /    In time:7:28 am  Out time:8:40 am  Total Treatment Time (min): 42  Total Timed Codes (min): 42  1:1 Treatment Time (MC/BCBS only): 32   Visit #: 11 of 24    Treatment Dx: Other low back pain [M54.59]  Left knee pain [M25.562]    SUBJECTIVE  Pain Level (0-10 scale): 1  Any medication changes, allergies to medications, adverse drug reactions, diagnosis change, or new procedure performed?: [x] No    [] Yes (see summary sheet for update)  Subjective functional status/changes:   [] No changes reported  \"Still a little numb on the left ankle and tight behind the knee when I bend it, but it is getting better. \"    OBJECTIVE    8 min Therapeutic Exercise:  [x] See flow sheet :   Rationale: increase ROM, increase strength, improve coordination, and increase proprioception to improve the patients ability to perform daily activities with decreased pain and symptom levels      12 min Therapeutic Activity:  [x]  See flow sheet :   Rationale: increase ROM, increase strength, improve coordination, and increase proprioception  to improve the patients ability to perform daily activities with decreased pain and symptom levels       22 min Neuromuscular Re-education:  []  See flow sheet :   Rationale: increase ROM, increase strength, improve coordination, and increase proprioception  to improve the patients ability to perform daily activities with decreased pain and symptom levels            With   [] TE   [] TA   [] neuro   [] other: Patient Education: [x] Review HEP    [] Progressed/Changed HEP based on:   [] positioning   [] body mechanics   [] transfers   [] heat/ice application    [] other:      Other Objective/Functional Measures:   KORI Special Tests (pre/post)  Hip Add Drop Test:                           Right: +/- Left:+/-  Trunk Rot                                           Right: 18 in/16 in   Left 18 in /16 in       Pain Level (0-10 scale) post treatment: 0    ASSESSMENT/Changes in Function:   Continues to walk with marked left toe out. Continues to report primary sx in posterior left knee at end range flexion and left medial ankle. Unsure if pain is mechanical or posterior knee pain is related to prosthetic. Continue to address pelvic and thoracic positioning and work on glut facilitation and rotation. Patient will continue to benefit from skilled PT services to modify and progress therapeutic interventions, address functional mobility deficits, address ROM deficits, address strength deficits, analyze and address soft tissue restrictions, analyze and cue movement patterns, analyze and modify body mechanics/ergonomics, assess and modify postural abnormalities, address imbalance/dizziness, and instruct in home and community integration to attain remaining goals. [x]  See Plan of Care  []  See progress note/recertification  []  See Discharge Summary         Progress towards goals / Updated goals:  Short Term Goals: STG- To be accomplished in 5 visits(s):  1. Pt will be compliant with HEP to encourage prophylaxis. Eval:dispensed, to be updated   Last PN:  2/1/23 reports that he is doing some of the HEP, not as [employer] as he should be  Current: 2/6/23 updated per chart     2. Pt will improve trunk rotation to 17 in bilaterally to indicate mobility needed for gait. Eval: Right: 20 in         Left: 19.5 in    Last PN: 2/1/23 Trunk Rot                                           Right: 16.5 in                         Left 16.5in   Current: 2/8/23 Trunk Rot   16 in post session bilaterally      Long Term Goals: LTG- To be accomplished in 24 visit(s):  1. Pt will improve hip adduction drop to negative with HEP to indicate femoral acetabular mobility needed for gait.   Eval:positive bilaterally   Last PN: 2/1/23 negative bilaterally post session    Current: 2/8/23 cleared bilaterally post session     2. Pt will be able to walk >1 mile without left LE or back pain to increase tolerance to daily activities. Eval:pain within 1 mile   Last PN: 2/1/23 not assessed  Current: 2/6/23 pt reporting left lateral lower leg pain with walking this weekend       3. Pt increase left LE strength to within 5 pounds of right leg to increase tolerance to work and recrational activites and improve left knee stability. Eval:  Strength Right Left   Quad Set strong Strong    Hip Flex 27 pounds 30 pounds   Knee Ext 70 pounds  60 pounds   Hamstrings 40 pounds 25 pounds   **tested with Handheld dynamometer   Last PN: Progressing 2/1/23  Strength Left   Quad Set good   Hip Flex 35#   Knee Ext 65#   Hamstrings 30#   Current: 2/6/23 not assessed                4.  Pt FOTO score will increase by 5 points to show improvement in function. Eval:67  Last PN: 2/1/23: not assessed  Current:  2/6/23 63 slight regression  *FOTO score is an established functional score where 100 = no disability*     5. Pt will improve left Knee AROM to equal that of right to increase tolerance to daily activities.   Eval   AROM Right Left   Knee Ext - Flex  0-128 1-119*   **Noted very restricted great toe ext on left  Last PN:progressing 2/1/23 Left Knee AROM Ext-Flex: 0-124* (felt posterior knee discomfort with end range flexion)  Current: 2/6/23 not assessed       PLAN  []  Upgrade activities as tolerated     [x]  Continue plan of care  []  Update interventions per flow sheet       []  Discharge due to:_  []  Other:_      Kenzie Cao, PT, DPT 2/8/2023  7:44 AM    Future Appointments   Date Time Provider [facility] Mari   2/15/2023  7:30 AM Deep Lozada PT, DPT [facility]   2/17/2023  7:30 AM [facility]ABETH Lin[facility]PHOLL[facility]   2/22/2023  7:30 AM Deep Lozada PT, DPT [facility]   2/24/2023  7:30 AM [facility] LinHPHOLLI [facility]   2/27/2023  9:00 AM Bc Morel PT [facility]

## 2023-02-15 ENCOUNTER — HOSPITAL ENCOUNTER (OUTPATIENT)
Facility: HOSPITAL | Age: 73
Setting detail: RECURRING SERIES
Discharge: HOME | End: 2023-02-18
Payer: MEDICARE

## 2023-02-15 ENCOUNTER — APPOINTMENT (OUTPATIENT)
Dept: PHYSICAL THERAPY | Age: 73
End: 2023-02-15
Payer: MEDICARE

## 2023-02-15 PROCEDURE — 97112 NEUROMUSCULAR REEDUCATION: CPT

## 2023-02-15 PROCEDURE — 97530 THERAPEUTIC ACTIVITIES: CPT

## 2023-02-15 NOTE — PROGRESS NOTES
PHYSICAL / OCCUPATIONAL THERAPY - DAILY TREATMENT NOTE (updated )    Patient Name: Lizett Rueda    Date: 2/15/2023    : 1950  Insurance: Payor: MEDICARE / Plan: MEDICARE PART A AND B / Product Type: *No Product type* /      Patient  verified Yes     Visit #   Current / Total 12 24   Time   In / Out 7:30 am 8:15 am   Pain   In / Out 1 0   Subjective Functional Status/Changes: \"It is getting better. \"  Reported continued numbness in medial left ankle and stiffness in posterior left knee. Is making an effort to not let left foot turn out   Changes to:  Meds, Allergies, Med Hx, Sx Hx? If yes, update Summary List no       TREATMENT AREA =  No admission diagnoses are documented for this encounter. OBJECTIVE         Therapeutic Procedures: Tx Min Billable or 1:1 Min (if diff from Tx Min) Procedure, Rationale, Specifics   5 0 [id number] Therapeutic Exercise (timed):  increase ROM, strength, coordination, balance, and proprioception to improve patient's ability to progress to PLOF and address remaining functional goals. (see flow sheet as applicable)     Details if applicable:       15 13 [id number] Therapeutic Activity (timed):  use of dynamic activities replicating functional movements to increase ROM, strength, coordination, balance, and proprioception in order to improve patient's ability to progress to PLOF and address remaining functional goals. (see flow sheet as applicable)     Details if applicable:      72085 Neuromuscular Re-Education (timed):  improve balance, coordination, kinesthetic sense, posture, core stability and proprioception to improve patient's ability to develop conscious control of individual muscles and awareness of position of extremities in order to progress to PLOF and address remaining functional goals.  (see flow sheet as applicable)     Details if applicable:       [facility] BC Totals Reminder: bill using total billable min of TIMED therapeutic procedures (example: do not include dry needle or estim unattended, both untimed codes, in totals to left)  8-22 min = 1 unit; 23-37 min = 2 units; 38-52 min = 3 units; 53-67 min = 4 units; 68-82 min = 5 units   Total: 45 Total: 38     [x]  Patient Education billed concurrently with other procedures   [x] Review HEP    [] Progressed/Changed HEP, detail:    [] Other detail:       Objective Information/Functional Measures/Assessment    LAW Special Tests (pre/post)  HGIR:                                                 Right: 70/90             Left: 75/90  Hip Add Drop Test:                           Right: +/-            Left:+/-  Trunk Rot                                           Right: 18 in/16 in   Left 18 in  /16 in     Apical Ext Test:                                  Right: dcr             Left: dcr    Gait: Noted marked left toe out and lack of heel strike or toe off - able to improve with visual feedback     Assessment - tolerated session well. Right glut max and left glut med fatigued. Suspect left medial ankle numbness is due to overuse of posterior tib due to gait mechanics. Patient will continue to benefit from skilled PT / OT services to modify and progress therapeutic interventions, analyze and address functional mobility deficits, analyze and address ROM deficits, analyze and address strength deficits, analyze and address soft tissue restrictions, analyze and cue for proper movement patterns, analyze and modify for postural abnormalities, analyze and address imbalance/dizziness, and instruct in home and community integration to address functional deficits and attain remaining goals. Progress toward goals / Updated goals:  []  See Progress Note/Recertification    Short Term Goals: STG- To be accomplished in 5 visits(s):  1. Pt will be compliant with HEP to encourage prophylaxis.   Eval:dispensed, to be updated   Last PN:  2/1/23 reports that he is doing some of the HEP, not as [employer] as he should be  Current: 2/6/23 updated per chart     2. Pt will improve trunk rotation to 17 in bilaterally to indicate mobility needed for gait. Eval: Right: 20 in         Left: 19.5 in    Last PN: 2/1/23 Trunk Rot                                           Right: 16.5 in                         Left 16.5in   Current: 2/8/23 Trunk Rot   16 in post session bilaterally      Long Term Goals: LTG- To be accomplished in 24 visit(s):  1. Pt will improve hip adduction drop to negative with HEP to indicate femoral acetabular mobility needed for gait. Eval:positive bilaterally   Last PN: 2/1/23 negative bilaterally post session    Current: 2/15/23 cleared bilaterally post session     2. Pt will be able to walk >1 mile without left LE or back pain to increase tolerance to daily activities. Eval:pain within 1 mile   Last PN: 2/1/23 not assessed  Current: 2/6/23 pt reporting left lateral lower leg pain with walking this weekend       3. Pt increase left LE strength to within 5 pounds of right leg to increase tolerance to work and recrational activites and improve left knee stability. Eval:  Strength Right Left   Quad Set strong Strong    Hip Flex 27 pounds 30 pounds   Knee Ext 70 pounds  60 pounds   Hamstrings 40 pounds 25 pounds   **tested with Handheld dynamometer   Last PN: Progressing 2/1/23  Strength Left   Quad Set good   Hip Flex 35#   Knee Ext 65#   Hamstrings 30#   Current: 2/6/23 not assessed                4.  Pt FOTO score will increase by 5 points to show improvement in function. Eval:67  Last PN: 2/1/23: not assessed  Current:  2/6/23 63 slight regression  *FOTO score is an established functional score where 100 = no disability*     5. Pt will improve left Knee AROM to equal that of right to increase tolerance to daily activities.   Eval   AROM Right Left   Knee Ext - Flex  0-128 1-119*   **Noted very restricted great toe ext on left  Last PN:progressing 2/1/23 Left Knee AROM Ext-Flex: 0-124* (felt posterior knee discomfort with end range flexion)  Current: 2/6/23 not assessed       PLAN  Yes  Continue plan of care  []  Upgrade activities as tolerated  []  Discharge due to :  []  Other:    Michelle Francisco PT    2/15/2023    7:48 AM    Future Appointments   Date Time Provider Parviz Bill   2/17/2023  7:30 AM AL Dangelo THE [facility]   2/22/2023  7:30 AM AL Gerardo THE [facility]   2/24/2023  7:30 AM AL Dangelo THE [facility]   2/27/2023  9:00 AM AL Dangelo THE [facility] [Alert] : alert [Oriented x 3] : ~L oriented x 3 [Well Nourished] : well nourished [FreeTextEntry3] : Scattered small linear crusted excoriations present on forearms, arms, neck, lower back\par Mid back: Clear\par \par Stroke test:\par 2+ flare\par No wheal\par No pruritus

## 2023-02-17 ENCOUNTER — HOSPITAL ENCOUNTER (OUTPATIENT)
Facility: HOSPITAL | Age: 73
Setting detail: RECURRING SERIES
Discharge: HOME | End: 2023-02-20
Payer: MEDICARE

## 2023-02-17 ENCOUNTER — APPOINTMENT (OUTPATIENT)
Dept: PHYSICAL THERAPY | Age: 73
End: 2023-02-17
Payer: MEDICARE

## 2023-02-17 PROCEDURE — 97110 THERAPEUTIC EXERCISES: CPT

## 2023-02-17 PROCEDURE — 97112 NEUROMUSCULAR REEDUCATION: CPT

## 2023-02-17 PROCEDURE — 97530 THERAPEUTIC ACTIVITIES: CPT

## 2023-02-17 NOTE — PROGRESS NOTES
PHYSICAL / OCCUPATIONAL THERAPY - DAILY TREATMENT NOTE (updated )    Patient Name: Aleksey Marking    Date: 2023    : 1950  Insurance: Payor: MEDICARE / Plan: MEDICARE PART A AND B / Product Type: *No Product type* /      Patient  verified Yes     Visit #   Current / Total 13 24   Time   In / Out 7:28 8:06   Pain   In / Out 0 0   Subjective Functional Status/Changes: Reports that he just knows it is there. Reports that he felt fine after last therapy session. Reports that he walked Wed and that was good. Reports that when he fully flexes the leg stops and he can feel it then. Changes to:  Meds, Allergies, Med Hx, Sx Hx? If yes, update Summary List no       TREATMENT AREA =  No admission diagnoses are documented for this encounter. OBJECTIVE         Therapeutic Procedures: Tx Min Billable or 1:1 Min (if diff from Tx Min) Procedure, Rationale, Specifics   10  [id number] Therapeutic Exercise (timed):  increase ROM, strength, coordination, balance, and proprioception to improve patient's ability to progress to PLOF and address remaining functional goals. (see flow sheet as applicable)     Details if applicable:       10  05847 Therapeutic Activity (timed):  use of dynamic activities replicating functional movements to increase ROM, strength, coordination, balance, and proprioception in order to improve patient's ability to progress to PLOF and address remaining functional goals. (see flow sheet as applicable)     Details if applicable:  Educated pt on gait training including performing heel strike, toe off, reciprocal arm swing, feeling left inner heel, leaning into walk, and trunk rotation.      18  [id number] Neuromuscular Re-Education (timed):  improve balance, coordination, kinesthetic sense, posture, core stability and proprioception to improve patient's ability to develop conscious control of individual muscles and awareness of position of extremities in order to progress to PLOF and address remaining functional goals. (see flow sheet as applicable)     Details if applicable:            Details if applicable:            Details if applicable:     45  [facility] BC Totals Reminder: bill using total billable min of TIMED therapeutic procedures (example: do not include dry needle or estim unattended, both untimed codes, in totals to left)  8-22 min = 1 unit; 23-37 min = 2 units; 38-52 min = 3 units; 53-67 min = 4 units; 68-82 min = 5 units   Total Total     [x]  Patient Education billed concurrently with other procedures   [x] Review HEP    [] Progressed/Changed HEP, detail:    [] Other detail:       Objective Information/Functional Measures/Assessment  LAW Special Tests (pre/post)  HGIR: Right: 70/NT Left: 80/NT  Hip Add Drop Test: Right: midline/NT     Left positive/NT  Trunk Rot: Right: 18.5 in/NT in       Left 18 in  /NT in      Assessment - Patient tolerated therapy session well as there were no adverse reactions today. Pt continues to require cuing on gait. Added left glut med to POC and pt was fatigued. Pt is progressing with therapy as indicated by pt tolerating increase in exercise repetitions and resistance. Although showing progress patient would benefit from continuation of skilled physical therapy to address the remaining limitations. Patient will continue to benefit from skilled PT / OT services to modify and progress therapeutic interventions, analyze and address functional mobility deficits, analyze and address ROM deficits, analyze and address strength deficits, analyze and address soft tissue restrictions, analyze and cue for proper movement patterns, analyze and modify for postural abnormalities, analyze and address imbalance/dizziness, and instruct in home and community integration to address functional deficits and attain remaining goals. Progress toward goals / Updated goals:  []  See Progress Note/Recertification     Short Term Goals: STG- To be accomplished in 5 visits(s):  1. Pt will be compliant with HEP to encourage prophylaxis. Eval:dispensed, to be updated   Last PN:  2/1/23 reports that he is doing some of the HEP, not as [employer] as he should be  Current: 2/17/23 reports compliance     2. Pt will improve trunk rotation to 17 in bilaterally to indicate mobility needed for gait. Eval: Right: 20 in         Left: 19.5 in    Last PN: 2/1/23 Trunk Rot                                           Right: 16.5 in                         Left 16.5in   Current: 2/17/23 Trunk Rot: Right: 18.5 in/NT in       Left 18 in  /NT in     Long Term Goals: LTG- To be accomplished in 24 visit(s):  1. Pt will improve hip adduction drop to negative with HEP to indicate femoral acetabular mobility needed for gait. Eval:positive bilaterally   Last PN: 2/1/23 negative bilaterally post session    Current: 2/17/23      2. Pt will be able to walk >1 mile without left LE or back pain to increase tolerance to daily activities. Eval:pain within 1 mile   Last PN: 2/1/23 not assessed  Current: 2/17/23 Hip Add Drop Test: Right: midline/NT     Left positive/N     3. Pt increase left LE strength to within 5 pounds of right leg to increase tolerance to work and recrational activites and improve left knee stability. Eval:  Strength Right Left   Quad Set strong Strong    Hip Flex 27 pounds 30 pounds   Knee Ext 70 pounds  60 pounds   Hamstrings 40 pounds 25 pounds   **tested with Handheld dynamometer   Last PN: Progressing 2/1/23  Strength Left   Quad Set good   Hip Flex 35#   Knee Ext 65#   Hamstrings 30#   Current: 2/17/23 not assessed                4.  Pt FOTO score will increase by 5 points to show improvement in function. Eval:67  Last PN: 2/1/23: not assessed  Current: 2/17/23: performed 2/6/23 63 slight regression  *FOTO score is an established functional score where 100 = no disability*     5. Pt will improve left Knee AROM to equal that of right to increase tolerance to daily activities.   Eval   AROM Right Left   Knee Ext - Flex  0-128 1-119*   **Noted very restricted great toe ext on left  Last PN:progressing 2/1/23 Left Knee AROM Ext-Flex: 0-124* (felt posterior knee discomfort with end range flexion)  Current: 2/17/23 not assessed    PLAN  Yes  Continue plan of care  []  Upgrade activities as tolerated  []  Discharge due to :  []  Other:    Lorna Muro, PT, DPT, CIMT    2/17/2023    7:27 AM    Future Appointments   Date Time Provider Parviz Bill   2/17/2023  7:30 AM [facility]   2/22/2023  7:30 AM AL Cartwright [facility]   2/24/2023  7:30 AM AL Traylor [facility]   2/27/2023  9:00 AM AL TraylorHPBENJY [facility]

## 2023-02-22 ENCOUNTER — APPOINTMENT (OUTPATIENT)
Dept: PHYSICAL THERAPY | Age: 73
End: 2023-02-22
Payer: MEDICARE

## 2023-02-22 ENCOUNTER — HOSPITAL ENCOUNTER (OUTPATIENT)
Facility: HOSPITAL | Age: 73
Setting detail: RECURRING SERIES
Discharge: HOME | End: 2023-02-25
Payer: MEDICARE

## 2023-02-22 PROCEDURE — 97112 NEUROMUSCULAR REEDUCATION: CPT

## 2023-02-22 PROCEDURE — 97530 THERAPEUTIC ACTIVITIES: CPT

## 2023-02-22 NOTE — PROGRESS NOTES
PHYSICAL / OCCUPATIONAL THERAPY - DAILY TREATMENT NOTE (updated )    Patient Name: Xiomy Gregory    Date: 2023    : 1950  Insurance: Payor: MEDICARE / Plan: MEDICARE PART A AND B / Product Type: *No Product type* /      Patient  verified Yes     Visit #   Current / Total 14 24   Time   In / Out 7:29 am 8:15 am    Pain   In / Out 1 0   Subjective Functional Status/Changes: \"Getting better I think\"  Reported continued posterior discomfort with left knee flexion and continued abnormal sensation at left medial ankle    Changes to:  Meds, Allergies, Med Hx, Sx Hx? If yes, update Summary List no       TREATMENT AREA =  No admission diagnoses are documented for this encounter. OBJECTIVE         Therapeutic Procedures: Tx Min Billable or 1:1 Min (if diff from Tx Min) Procedure, Rationale, Specifics    [id number] Therapeutic Activity (timed):  use of dynamic activities replicating functional movements to increase ROM, strength, coordination, balance, and proprioception in order to improve patient's ability to progress to PLOF and address remaining functional goals. (see flow sheet as applicable)     Details if applicable:        21234 Neuromuscular Re-Education (timed):  improve balance, coordination, kinesthetic sense, posture, core stability and proprioception to improve patient's ability to develop conscious control of individual muscles and awareness of position of extremities in order to progress to PLOF and address remaining functional goals.  (see flow sheet as applicable)     Details if applicable:            Details if applicable:            Details if applicable:            Details if applicable:        [address] BC Totals Reminder: bill using total billable min of TIMED therapeutic procedures (example: do not include dry needle or estim unattended, both untimed codes, in totals to left)  8-22 min = 1 unit; 23-37 min = 2 units; 38-52 min = 3 units; 53-67 min = 4 units; 68-82 min = 5 units Total:46 Total: 40     [x]  Patient Education billed concurrently with other procedures   [x] Review HEP    [] Progressed/Changed HEP, detail:    [] Other detail:       Objective Information/Functional Measures/Assessment    LAW Special Tests (pre/post)  HGIR:                                                 Right: 65/85             Left: 90  Hip Add Drop Test:                           Right: +/-            Left:+/-  Trunk Rot                                           Right: 19 in/16 in Left 18 in  /16 in    Left Knee Flexion AROM: 125* (tightness at posterior knee)  Left Knee Ext strength: 65# with hand held dynamometer     Focussed session on establishing ZOA. Need to continue to address left abs to improve trunk position and pelvic position. Primary complaint is of posterior knee pain with flexion approaching end range. Marked glut and hamstring inhibition. Patient will continue to benefit from skilled PT / OT services to modify and progress therapeutic interventions, analyze and address functional mobility deficits, analyze and address ROM deficits, analyze and address strength deficits, analyze and address soft tissue restrictions, analyze and cue for proper movement patterns, analyze and modify for postural abnormalities, and instruct in home and community integration to address functional deficits and attain remaining goals. Progress toward goals / Updated goals:  []  See Progress Note/Recertification    Short Term Goals: STG- To be accomplished in 5 visits(s):  1. Pt will be compliant with HEP to encourage prophylaxis. Eval:dispensed, to be updated   Last PN:  2/1/23 reports that he is doing some of the HEP, not as [employer] as he should be  Current: 2/17/23 reports compliance     2. Pt will improve trunk rotation to 17 in bilaterally to indicate mobility needed for gait.   Eval: Right: 20 in         Left: 19.5 in    Last PN: 2/1/23 Trunk Rot                                           Right: 16.5 in                         Left 16.5in   Current: Progressing 2/22/23 16 in bilaterally post session      Long Term Goals: LTG- To be accomplished in 24 visit(s):  1. Pt will improve hip adduction drop to negative with HEP to indicate femoral acetabular mobility needed for gait. Eval:positive bilaterally   Last PN: 2/1/23 negative bilaterally post session    Current: 2/22/23 cleared to table post session      2. Pt will be able to walk >1 mile without left LE or back pain to increase tolerance to daily activities. Eval:pain within 1 mile   Last PN: 2/1/23 not assessed  Current: 2/17/23 Hip Add Drop Test: Right: midline/NT     Left positive/N     3. Pt increase left LE strength to within 5 pounds of right leg to increase tolerance to work and recrational activites and improve left knee stability. Eval:  Strength Right Left   Quad Set strong Strong    Hip Flex 27 pounds 30 pounds   Knee Ext 70 pounds  60 pounds   Hamstrings 40 pounds 25 pounds   **tested with Handheld dynamometer   Last PN: Progressing 2/1/23  Strength Left   Quad Set good   Hip Flex 35#   Knee Ext 65#   Hamstrings 30#   Current: 2/22/23 maintaining above progress                 4.  Pt FOTO score will increase by 5 points to show improvement in function. Eval:67  Last PN: 2/1/23: not assessed  Current: 2/17/23: performed 2/6/23 63 slight regression  *FOTO score is an established functional score where 100 = no disability*     5. Pt will improve left Knee AROM to equal that of right to increase tolerance to daily activities.   Eval   AROM Right Left   Knee Ext - Flex  0-128 1-119*   **Noted very restricted great toe ext on left  Last PN:progressing 2/1/23 Left Knee AROM Ext-Flex: 0-124* (felt posterior knee discomfort with end range flexion)  Current: 2/22/23 Progressing Left Knee Flexion AROM: 125* (tightness at posterior knee)         PLAN  Yes  Continue plan of care  []  Upgrade activities as tolerated  []  Discharge due to :  [] Other:    Jovani Lucio, PT    2/22/2023    7:42 AM    Future Appointments   Date Time Provider Parviz Bill   2/24/2023  7:30 AM Normand Osler, PT BRIANW [facility]   2/27/2023  9:00 AM Jovani Lucio, AL PAREKH [facility]

## 2023-02-24 ENCOUNTER — APPOINTMENT (OUTPATIENT)
Dept: PHYSICAL THERAPY | Age: 73
End: 2023-02-24
Payer: MEDICARE

## 2023-02-24 ENCOUNTER — HOSPITAL ENCOUNTER (OUTPATIENT)
Facility: HOSPITAL | Age: 73
Setting detail: RECURRING SERIES
Discharge: HOME | End: 2023-02-27
Payer: MEDICARE

## 2023-02-24 PROCEDURE — 97112 NEUROMUSCULAR REEDUCATION: CPT

## 2023-02-24 PROCEDURE — 97110 THERAPEUTIC EXERCISES: CPT

## 2023-02-24 PROCEDURE — 97530 THERAPEUTIC ACTIVITIES: CPT

## 2023-02-24 NOTE — PROGRESS NOTES
PHYSICAL / OCCUPATIONAL THERAPY - DAILY TREATMENT NOTE (updated )    Patient Name: Dedra Contreras    Date: 2023    : 1950  Insurance: Payor: MEDICARE / Plan: MEDICARE PART A AND B / Product Type: *No Product type* /      Patient  verified Yes     Visit #   Current / Total 15 24   Time   In / Out 7:30 8:26   Pain   In / Out 0 0   Subjective Functional Status/Changes: Pt questioning if the compression socks are contributing to the symptoms. Changes to:  Meds, Allergies, Med Hx, Sx Hx? If yes, update Summary List no       TREATMENT AREA =  No admission diagnoses are documented for this encounter. OBJECTIVE         Therapeutic Procedures: Tx Min Billable or 1:1 Min (if diff from Tx Min) Procedure, Rationale, Specifics   15  24188 Therapeutic Exercise (timed):  increase ROM, strength, coordination, balance, and proprioception to improve patient's ability to progress to PLOF and address remaining functional goals. (see flow sheet as applicable)     Details if applicable:       15  69238 Therapeutic Activity (timed):  use of dynamic activities replicating functional movements to increase ROM, strength, coordination, balance, and proprioception in order to improve patient's ability to progress to PLOF and address remaining functional goals. (see flow sheet as applicable)     Details if applicable:       98495 Neuromuscular Re-Education (timed):  improve balance, coordination, kinesthetic sense, posture, core stability and proprioception to improve patient's ability to develop conscious control of individual muscles and awareness of position of extremities in order to progress to PLOF and address remaining functional goals.  (see flow sheet as applicable)     Details if applicable:            Details if applicable:            Details if applicable:     64  751 [facility] Totals Reminder: bill using total billable min of TIMED therapeutic procedures (example: do not include dry needle or estim unattended, both untimed codes, in totals to left)  8-22 min = 1 unit; 23-37 min = 2 units; 38-52 min = 3 units; 53-67 min = 4 units; 68-82 min = 5 units   Total Total     [x]  Patient Education billed concurrently with other procedures   [x] Review HEP    [] Progressed/Changed HEP, detail:    [] Other detail:       Objective Information/Functional Measures/Assessment    LAW Special Tests (pre/post)  HGIR: Right: 65/NT                 Left: 90  Hip Add Drop Test:    Right: positive/NT                   Left:positive/NT  Trunk Rot  Right: 18.5 in/NT in     Left 19 in  /NT in     Assessment: Patient tolerated therapy session well as there were no adverse reactions today. Pt feeling fatigued with left glute med. Added cross reach to further engage abs with hamstrings. Added heel downs to POC and pt felt left was much weaker than the left. Pt did require more cuing with left side. Pt is progressing with therapy as indicated by pt tolerating increase in exercise repetitions and resistance. Although showing progress patient would benefit from continuation of skilled physical therapy to address the remaining limitations. Patient will continue to benefit from skilled PT / OT services to modify and progress therapeutic interventions, analyze and address functional mobility deficits, analyze and address ROM deficits, analyze and address strength deficits, analyze and address soft tissue restrictions, analyze and cue for proper movement patterns, analyze and modify for postural abnormalities, and instruct in home and community integration to address functional deficits and attain remaining goals. Progress toward goals / Updated goals:  []  See Progress Note/Recertification     Short Term Goals: STG- To be accomplished in 5 visits(s):  1. Pt will be compliant with HEP to encourage prophylaxis.   Eval:dispensed, to be updated   Last PN:  2/1/23 reports that he is doing some of the HEP, not as Gnosticism as he should be  Current: 2/24/23 reports compliance     2. Pt will improve trunk rotation to 17 in bilaterally to indicate mobility needed for gait. Eval: Right: 20 in         Left: 19.5 in    Last PN: 2/1/23 Trunk Rot                                           Right: 16.5 in                         Left 16.5in   Current: 2/24/23 Trunk Rot  Right: 18.5 in/NT in     Left 19 in  /NT in     Long Term Goals: LTG- To be accomplished in 24 visit(s):  1. Pt will improve hip adduction drop to negative with HEP to indicate femoral acetabular mobility needed for gait. Eval:positive bilaterally   Last PN: 2/1/23 negative bilaterally post session    Current: 2/24/23 NT     2. Pt will be able to walk >1 mile without left LE or back pain to increase tolerance to daily activities. Eval:pain within 1 mile   Last PN: 2/1/23 not assessed  Current:      3. Pt increase left LE strength to within 5 pounds of right leg to increase tolerance to work and recrational activites and improve left knee stability. Eval:  Strength Right Left   Quad Set strong Strong    Hip Flex 27 pounds 30 pounds   Knee Ext 70 pounds  60 pounds   Hamstrings 40 pounds 25 pounds   **tested with Handheld dynamometer   Last PN: Progressing 2/1/23  Strength Left   Quad Set good   Hip Flex 35#   Knee Ext 65#   Hamstrings 30#   Current: 2/24/23 performed knee extension on the debby with inc in resistance           4. Pt FOTO score will increase by 5 points to show improvement in function. Eval:67  Last PN: 2/1/23: not assessed  Current: 2/24/23: performed 2/6/23 63 slight regression, will try to perform next visit  *FOTO score is an established functional score where 100 = no disability*     5. Pt will improve left Knee AROM to equal that of right to increase tolerance to daily activities.   Eval   AROM Right Left   Knee Ext - Flex  0-128 1-119*   **Noted very restricted great toe ext on left  Last PN:progressing 2/1/23 Left Knee AROM Ext-Flex: 0-124* (felt posterior knee discomfort with end range flexion)  Current:     PLAN  Yes  Continue plan of care  []  Upgrade activities as tolerated  []  Discharge due to :  []  Other:    Urban Hilton, PT , DPT, CIMT   2/24/2023    7:37 AM    Future Appointments   Date Time Provider Parviz Bill   2/27/2023  9:00 AM Lisbet El, [place] [facility]

## 2023-02-27 ENCOUNTER — APPOINTMENT (OUTPATIENT)
Dept: PHYSICAL THERAPY | Age: 73
End: 2023-02-27
Payer: MEDICARE

## 2023-02-27 ENCOUNTER — HOSPITAL ENCOUNTER (OUTPATIENT)
Facility: HOSPITAL | Age: 73
Setting detail: RECURRING SERIES
Discharge: HOME | End: 2023-03-02
Payer: MEDICARE

## 2023-02-27 PROCEDURE — 97110 THERAPEUTIC EXERCISES: CPT

## 2023-02-27 PROCEDURE — 97112 NEUROMUSCULAR REEDUCATION: CPT

## 2023-02-27 PROCEDURE — 97530 THERAPEUTIC ACTIVITIES: CPT

## 2023-02-27 NOTE — PROGRESS NOTES
PHYSICAL / OCCUPATIONAL THERAPY - DAILY TREATMENT NOTE (updated )    Patient Name: Michael Earl    Date: 2023    : 1950  Insurance: Payor: MEDICARE / Plan: MEDICARE PART A AND B / Product Type: *No Product type* /      Patient  verified Yes     Visit #   Current / Total 16 24   Time   In / Out 8:02 8:59   Pain   In / Out 0 0   Subjective Functional Status/Changes: Pt reports that the worst pain in the last 48 hrs is in the posterior knee, 2/10. Reports that he is compliant with HEP. Reports that he is able to walk 1.5 miles. Reports that stairs are doing well. Reports that sometimes he stiff in the morning and that goes away in the morning. Changes to:  Meds, Allergies, Med Hx, Sx Hx? If yes, update Summary List no       TREATMENT AREA =  No admission diagnoses are documented for this encounter. OBJECTIVE         Therapeutic Procedures: Tx Min Billable or 1:1 Min (if diff from Tx Min) Procedure, Rationale, Specifics   15  26846 Therapeutic Exercise (timed):  increase ROM, strength, coordination, balance, and proprioception to improve patient's ability to progress to PLOF and address remaining functional goals. (see flow sheet as applicable)     Details if applicable:       10  57676 Therapeutic Activity (timed):  use of dynamic activities replicating functional movements to increase ROM, strength, coordination, balance, and proprioception in order to improve patient's ability to progress to PLOF and address remaining functional goals. (see flow sheet as applicable)     Details if applicable:     32  63455 Neuromuscular Re-Education (timed):  improve balance, coordination, kinesthetic sense, posture, core stability and proprioception to improve patient's ability to develop conscious control of individual muscles and awareness of position of extremities in order to progress to PLOF and address remaining functional goals.  (see flow sheet as applicable)     Details if applicable:     [place] [address]82 Manual Therapy (timed):  decrease pain, increase ROM, increase tissue extensibility, decrease trigger points, and increase postural awareness to improve patient's ability to progress to PLOF and address remaining functional goals. The manual therapy interventions were performed at a separate and distinct time from the therapeutic activities interventions . (see flow sheet as applicable)     Details if applicable:  Pt supine at end range of flexion: inf grade III patellar glides, post proximal tibial grade III mobs to inc knee flexion; Details if applicable:     62  [facility] Totals Reminder: bill using total billable min of TIMED therapeutic procedures (example: do not include dry needle or estim unattended, both untimed codes, in totals to left)  8-22 min = 1 unit; 23-37 min = 2 units; 38-52 min = 3 units; 53-67 min = 4 units; 68-82 min = 5 units   Total Total     [x]  Patient Education billed concurrently with other procedures   [x] Review HEP    [] Progressed/Changed HEP, detail:    [] Other detail:       Objective Information/Functional Measures/Assessment  Trunk Rot  Right: 17in     Left 19 in     Adductor drop test: right: NT/neg left: NT/neg       Strength Left   Quad Set good   Hip Flex 37#   Knee Ext 65#   Hamstrings 30# (pt felt in back of the knee)     Left Knee AROM Ext-Flex: 0-122/125 (after manual therapy)    Assessment: Pt is a 74 yr old male dx with lower back pain and left knee pain. This is patients 16th visit including evaluation on 1/4/23. Pt has progressed with therapy as pt reporting that he is able to walk 1.5 miles, he is doing better with stairs, and is overall in less pain, just stiffness. Pt with inc in certain objective data, please see goals. Pt continues to have pain/discomfort along the anterior tib and posterior knee. Pt continues to have dec in knee ROM, dec in LE force strength, and functional limitations.  Pt continues to have dec in lower trunk rotation and positive adductor test which indicates continued lumbo-pelvic femoral dysfunction. Pt would benefit from continuing with skilled PT to further address impairments. Patient will continue to benefit from skilled PT / OT services to modify and progress therapeutic interventions, analyze and address functional mobility deficits, analyze and address ROM deficits, analyze and address strength deficits, analyze and address soft tissue restrictions, analyze and cue for proper movement patterns, analyze and modify for postural abnormalities, analyze and address imbalance/dizziness, and instruct in home and community integration to address functional deficits and attain remaining goals. Progress toward goals / Updated goals:  []  See Progress Note/Recertification    Short Term Goals: STG- To be accomplished in 5 visits(s):  1. Pt will be compliant with HEP to encourage prophylaxis. Eval:dispensed, to be updated   Last PN:  2/1/23 reports that he is doing some of the HEP, not as Muslim as he should be  Current: 2/27/23 reports compliance- goal MET     2. Pt will improve trunk rotation to 17 in bilaterally to indicate mobility needed for gait. Eval: Right: 20 in         Left: 19.5 in    Last PN: 2/1/23 Trunk Rot                                           Right: 16.5 in                         Left 16.5in   Current: 2/27/23 Trunk Rot  Right: 17in     Left 19 in - goal to be met in 4 more weeks     Long Term Goals: LTG- To be accomplished in 24 visit(s):  1. Pt will improve hip adduction drop to negative with HEP to indicate femoral acetabular mobility needed for gait. Eval:positive bilaterally   Last PN: 2/1/23 negative bilaterally post session    Current: 2/27/23 Adductor drop test: right: NT/neg left: NT/neg-goal to be met in 4 more weeks     2. Pt will be able to walk >1 mile without left LE or back pain to increase tolerance to daily activities.   Eval:pain within 1 mile   Last PN: 2/1/23 not assessed  Current: 2/27/23 pt reports that he is able to walk 1.5 miles-goal MET     3. Pt increase left LE strength to within 5 pounds of right leg to increase tolerance to work and recrational activites and improve left knee stability. Eval:  Strength Right Left   Quad Set strong Strong    Hip Flex 27 pounds 30 pounds   Knee Ext 70 pounds  60 pounds   Hamstrings 40 pounds 25 pounds   **tested with Handheld dynamometer   Last PN: Progressing 2/1/23  Strength Left   Quad Set good   Hip Flex 35#   Knee Ext 65#   Hamstrings 30#   Current: 2/27/23- slow progression-goal to be met in 4 more weeks   Strength Left   Quad Set good   Hip Flex 37#   Knee Ext 65#   Hamstrings 30# (pt felt in back of the knee)            4. Pt FOTO score will increase by 5 points to show improvement in function. Eval:67  Last PN: 2/1/23: not assessed  Current: 2//27/23 61 regression-but reports feeling better-goal to be met in 4 more weeks  *FOTO score is an established functional score where 100 = no disability*     5. Pt will improve left Knee AROM to equal that of right to increase tolerance to daily activities.   Eval   AROM Right Left   Knee Ext - Flex  0-128 1-119*   **Noted very restricted great toe ext on left  Last PN:progressing 2/1/23 Left Knee AROM Ext-Flex: 0-124* (felt posterior knee discomfort with end range flexion)  Current: 2/27/23 Left Knee AROM Ext-Flex: 0-122/125 (after manual therapy)-progressing-goal to be met in 4 more weeks      PLAN  Yes  Continue plan of care  []  Upgrade activities as tolerated  []  Discharge due to :  [x]  Other: continue skilled PT 2x/week for 4 more weeks    Leda Ledesma, PT, DPT, CIMT    2/27/2023    7:56 AM    Future Appointments   Date Time Provider Parviz Bill   2/27/2023  8:00 AM Leda Ledesma PT [facility]

## 2023-02-27 NOTE — PROGRESS NOTES
[facility] at the [address], [place], [address] [address]  Phone: [phone]      Fax:  [phone]    Progress Note        Patient name: Lee Davis Start of Care: 2023   Referral source: Jose L Villatoro MD : 1950               Medical Diagnosis: Pain in right knee [M25.561]  Other low back pain [M54.59]    Onset Date:Ongoing May 2022               Treatment Diagnosis: Left Knee Pain   LBP   Prior Hospitalization: see medical history Provider#: [id number]   Medications: Verified on Patient summary List    Comorbidities: previous surgery, HTN Allergies, Hx of LBP   Prior Level of Function: able to walk 1-2 miles, unrestricted with ADLs, work and recreation     Visits from Start of Care: 16    Missed Visits: 0    Updated Goals/Measure of Progress: To be achieved in 4 more weeks:    Short Term Goals: STG- To be accomplished in 5 visits(s):  1. Pt will be compliant with HEP to encourage prophylaxis. Eval:dispensed, to be updated   Last PN:  23 reports that he is doing some of the HEP, not as Amish as he should be  Current: 23 reports compliance- goal MET     2. Pt will improve trunk rotation to 17 in bilaterally to indicate mobility needed for gait. Eval: Right: 20 in         Left: 19.5 in    Last PN: 23 Trunk Rot                                           Right: 16.5 in                         Left 16.5in   Current: 23 Trunk Rot  Right: 17in     Left 19 in - goal to be met in 4 more weeks     Long Term Goals: LTG- To be accomplished in 24 visit(s):  1. Pt will improve hip adduction drop to negative with HEP to indicate femoral acetabular mobility needed for gait. Eval:positive bilaterally   Last PN: 23 negative bilaterally post session    Current: 23 Adductor drop test: right: NT/neg left: NT/neg-goal to be met in 4 more weeks     2.   Pt will be able to walk >1 mile without left LE or back pain to increase tolerance to daily activities. Eval:pain within 1 mile   Last PN: 2/1/23 not assessed  Current: 2/27/23 pt reports that he is able to walk 1.5 miles-goal MET     3. Pt increase left LE strength to within 5 pounds of right leg to increase tolerance to work and recrational activites and improve left knee stability. Eval:  Strength Right Left   Quad Set strong Strong    Hip Flex 27 pounds 30 pounds   Knee Ext 70 pounds  60 pounds   Hamstrings 40 pounds 25 pounds   **tested with Handheld dynamometer   Last PN: Progressing 2/1/23  Strength Left   Quad Set good   Hip Flex 35#   Knee Ext 65#   Hamstrings 30#   Current: 2/27/23- slow progression-goal to be met in 4 more weeks   Strength Left   Quad Set good   Hip Flex 37#   Knee Ext 65#   Hamstrings 30# (pt felt in back of the knee)            4. Pt FOTO score will increase by 5 points to show improvement in function. Eval:67  Last PN: 2/1/23: not assessed  Current: 2//27/23 61 regression-but reports feeling better-goal to be met in 4 more weeks  *FOTO score is an established functional score where 100 = no disability*     5. Pt will improve left Knee AROM to equal that of right to increase tolerance to daily activities. Eval   AROM Right Left   Knee Ext - Flex  0-128 1-119*   **Noted very restricted great toe ext on left  Last PN:progressing 2/1/23 Left Knee AROM Ext-Flex: 0-124* (felt posterior knee discomfort with end range flexion)  Current: 2/27/23 Left Knee AROM Ext-Flex: 0-122/125 (after manual therapy)-progressing-goal to be met in 4 more weeks    Summary of Care/ Key Functional Changes:  Pt is a 74 yr old male dx with lower back pain and left knee pain. This is patients 16th visit including evaluation on 1/4/23. Pt has progressed with therapy as pt reporting that he is able to walk 1.5 miles, he is doing better with stairs, and is overall in less pain, just stiffness. Pt with inc in certain objective data, please see goals.  Pt continues to have pain/discomfort along the anterior tib and posterior knee. Pt continues to have dec in knee ROM, dec in LE force strength, and functional limitations. Pt continues to have dec in lower trunk rotation and positive adductor test which indicates continued lumbo-pelvic femoral dysfunction. Pt would benefit from continuing with skilled PT to further address impairments. Patient will continue to benefit from skilled PT / OT services to modify and progress therapeutic interventions, analyze and address functional mobility deficits, analyze and address ROM deficits, analyze and address strength deficits, analyze and address soft tissue restrictions, analyze and cue for proper movement patterns, analyze and modify for postural abnormalities, analyze and address imbalance/dizziness, and instruct in home and community integration to address functional deficits and attain remaining goals.       ASSESSMENT/RECOMMENDATIONS:    []Continue therapy per initial plan/protocol at a frequency of  2 x per week for 4 more weeks  []Continue therapy with the following recommended changes:_____________________      _____________________________________________________________________  []Discontinue therapy progressing towards or have reached established goals  []Discontinue therapy due to lack of appreciable progress towards goals  []Discontinue therapy due to lack of attendance or compliance  []Await Physician's recommendations/decisions regarding therapy  []Other:________________________________________________________________    Thank you for this referral.   Jena Kelly, PT, DPT, CIMT 2/27/2023 2:15 PM

## 2023-03-07 ENCOUNTER — APPOINTMENT (OUTPATIENT)
Facility: HOSPITAL | Age: 73
End: 2023-03-07
Payer: MEDICARE

## 2023-03-08 ENCOUNTER — HOSPITAL ENCOUNTER (OUTPATIENT)
Facility: HOSPITAL | Age: 73
Setting detail: RECURRING SERIES
End: 2023-03-08
Payer: MEDICARE

## 2023-03-13 ENCOUNTER — HOSPITAL ENCOUNTER (OUTPATIENT)
Facility: HOSPITAL | Age: 73
Setting detail: RECURRING SERIES
Discharge: HOME | End: 2023-03-16
Payer: MEDICARE

## 2023-03-13 PROCEDURE — 97112 NEUROMUSCULAR REEDUCATION: CPT

## 2023-03-13 PROCEDURE — 97110 THERAPEUTIC EXERCISES: CPT

## 2023-03-13 NOTE — PROGRESS NOTES
PHYSICAL / OCCUPATIONAL THERAPY - DAILY TREATMENT NOTE (updated )    Patient Name: Anson Erickson    Date: 3/13/2023    : 1950  Insurance: Payor: MEDICARE / Plan: MEDICARE PART A AND B / Product Type: *No Product type* /      Patient  verified Yes     Visit #   Current / Total 17 24   Time   In / Out 758 840   Pain   In / Out 0 0   Subjective Functional Status/Changes: No pain now but I am still having LBP mostly on left, also some pain lateral ankle and low leg   Changes to:  Meds, Allergies, Med Hx, Sx Hx? If yes, update Summary List no       TREATMENT AREA =  No admission diagnoses are documented for this encounter. OBJECTIVE        Therapeutic Procedures: Tx Min Billable or 1:1 Min (if diff from Tx Min) Procedure, Rationale, Specifics   12  58519 Therapeutic Exercise (timed):  increase ROM, strength, coordination, balance, and proprioception to improve patient's ability to progress to PLOF and address remaining functional goals. (see flow sheet as applicable)     Details if applicable:       30  73462 Neuromuscular Re-Education (timed):  improve balance, coordination, kinesthetic sense, posture, core stability and proprioception to improve patient's ability to develop conscious control of individual muscles and awareness of position of extremities in order to progress to PLOF and address remaining functional goals.  (see flow sheet as applicable)     Details if applicable: focus on trunk rotation, IO/TA activation , repositioning                  42  [facility] Totals Reminder: bill using total billable min of TIMED therapeutic procedures (example: do not include dry needle or estim unattended, both untimed codes, in totals to left)  8-22 min = 1 unit; 23-37 min = 2 units; 38-52 min = 3 units; 53-67 min = 4 units; 68-82 min = 5 units   Total Total     TOTAL TREATMENT TIME:        42     [x]  Patient Education billed concurrently with other procedures   [x] Review HEP    [] Progressed/Changed HEP, detail:    [] Other detail:       Objective Information/Functional Measures/Assessment: patient demonstrates good tolerance to all activities without reports of pain however demonstrates fatigue left quad after one rep of 10 with resistance of 15 #, improvement in trunk rotation post session. Challenged with left stance step through coordination with arm reach. LAW Special Tests (pre/post)  Trunk Rot                                           Right: 19.5 in/17    Left 19 in /17        Patient will continue to benefit from skilled PT / OT services to modify and progress therapeutic interventions, analyze and address functional mobility deficits, analyze and address ROM deficits, analyze and address strength deficits, analyze and address soft tissue restrictions, and analyze and cue for proper movement patterns to address functional deficits and attain remaining goals. Progress toward goals / Updated goals:  []  See Progress Note/Recertification    Short Term Goals: STG- To be accomplished in 5 visits(s):  1. Pt will be compliant with HEP to encourage prophylaxis. Eval:dispensed, to be updated   Last PN:  2/1/23 reports that he is doing some of the HEP, not as [employer] as he should be  Current: 2/27/23 reports compliance- goal MET     2. Pt will improve trunk rotation to 17 in bilaterally to indicate mobility needed for gait. Eval: Right: 20 in         Left: 19.5 in    Last PN: 2/1/23 Trunk Rot                                           Right: 16.5 in                         Left 16.5in   Current: 3/13/23 Trunk Rot   Right: 19.5 in/17   Left 19 in /17, progressing     Long Term Goals: LTG- To be accomplished in 24 visit(s):  1. Pt will improve hip adduction drop to negative with HEP to indicate femoral acetabular mobility needed for gait.   Eval:positive bilaterally   Last PN: 2/1/23 negative bilaterally post session    Current: 2/27/23 Adductor drop test: right: NT/neg left: NT/neg-goal to be met in 4 more weeks     2. Pt will be able to walk >1 mile without left LE or back pain to increase tolerance to daily activities. Eval:pain within 1 mile   Last PN: 2/1/23 not assessed  Current: 2/27/23 pt reports that he is able to walk 1.5 miles-goal MET     3. Pt increase left LE strength to within 5 pounds of right leg to increase tolerance to work and recrational activites and improve left knee stability. Eval:  Strength Right Left   Quad Set strong Strong    Hip Flex 27 pounds 30 pounds   Knee Ext 70 pounds  60 pounds   Hamstrings 40 pounds 25 pounds   **tested with Handheld dynamometer   Last PN: Progressing 2/1/23  Strength Left   Quad Set good   Hip Flex 35#   Knee Ext 65#   Hamstrings 30#   Current: 2/27/23- slow progression-goal to be met in 4 more weeks   Strength Left   Quad Set good   Hip Flex 37#   Knee Ext 65#   Hamstrings 30# (pt felt in back of the knee)            4. Pt FOTO score will increase by 5 points to show improvement in function. Eval:67  Last PN: 2/1/23: not assessed  Current: 2//27/23 61 regression-but reports feeling better-goal to be met in 4 more weeks  *FOTO score is an established functional score where 100 = no disability*     5. Pt will improve left Knee AROM to equal that of right to increase tolerance to daily activities.   Eval   AROM Right Left   Knee Ext - Flex  0-128 1-119*   **Noted very restricted great toe ext on left  Last PN:progressing 2/1/23 Left Knee AROM Ext-Flex: 0-124* (felt posterior knee discomfort with end range flexion)  Current: 2/27/23 Left Knee AROM Ext-Flex: 0-122/125 (after manual therapy)-progressing-goal to be met in 4 more weeks      PLAN  Yes  Continue plan of care  []  Upgrade activities as tolerated  []  Discharge due to :  []  Other:    Tono Nicole, PT    3/13/2023    7:37 AM    Future Appointments   Date Time Provider Parviz Bill   3/13/2023  8:00 AM Vidya sheets, PT [facility]   3/17/2023  8:00 AM Leilani Chavez, PT [facility]   3/21/2023 10:00 AM Derrel Shay, PT [facility]   3/23/2023 10:00 AM Derrel Huh, PT [facility]   3/28/2023 10:00 AM Derrel Huh, PT [facility]   3/31/2023  8:30 AM Derrel Shay, PT [facility]   4/11/2023 10:00 AM Derrel Shay, PT [facility]   4/13/2023  8:30 AM Derrel Shay, PT [facility]

## 2023-03-14 ENCOUNTER — APPOINTMENT (OUTPATIENT)
Facility: HOSPITAL | Age: 73
End: 2023-03-14
Payer: MEDICARE

## 2023-03-17 ENCOUNTER — HOSPITAL ENCOUNTER (OUTPATIENT)
Facility: HOSPITAL | Age: 73
Setting detail: RECURRING SERIES
Discharge: HOME | End: 2023-03-20
Payer: MEDICARE

## 2023-03-17 PROCEDURE — 97112 NEUROMUSCULAR REEDUCATION: CPT

## 2023-03-17 PROCEDURE — 97530 THERAPEUTIC ACTIVITIES: CPT

## 2023-03-17 NOTE — PROGRESS NOTES
PHYSICAL / OCCUPATIONAL THERAPY - DAILY TREATMENT NOTE (updated )    Patient Name: Craoline Dash    Date: 3/17/2023    : 1950  Insurance: Payor: MEDICARE / Plan: MEDICARE PART A AND B / Product Type: *No Product type* /      Patient  verified Yes     Visit #   Current / Total 18 24   Time   In / Out 8:34 (8:45) 9:15 (9:08)   Pain   In / Out 3 left lower back 0   Subjective Functional Status/Changes: Reports that the left lower back is bothering him for some reason. Changes to:  Meds, Allergies, Med Hx, Sx Hx? If yes, update Summary List no       TREATMENT AREA =  No admission diagnoses are documented for this encounter. OBJECTIVE    Therapeutic Procedures: Tx Min Billable or 1:1 Min (if diff from Tx Min) Procedure, Rationale, Specifics   18 0 43042 Therapeutic Exercise (timed):  increase ROM, strength, coordination, balance, and proprioception to improve patient's ability to progress to PLOF and address remaining functional goals. (see flow sheet as applicable)     Details if applicable:       8 8 97739 Therapeutic Activity (timed):  use of dynamic activities replicating functional movements to increase ROM, strength, coordination, balance, and proprioception in order to improve patient's ability to progress to PLOF and address remaining functional goals. (see flow sheet as applicable)     Details if applicable:     15 15 94085 Neuromuscular Re-Education (timed):  improve balance, coordination, kinesthetic sense, posture, core stability and proprioception to improve patient's ability to develop conscious control of individual muscles and awareness of position of extremities in order to progress to PLOF and address remaining functional goals.  (see flow sheet as applicable)     Details if applicable:            Details if applicable:            Details if applicable:     43 24 [facility] Totals Reminder: bill using total billable min of TIMED therapeutic procedures (example: do not include dry needle

## 2023-03-21 ENCOUNTER — HOSPITAL ENCOUNTER (OUTPATIENT)
Facility: HOSPITAL | Age: 73
Setting detail: RECURRING SERIES
Discharge: HOME | End: 2023-03-24
Payer: MEDICARE

## 2023-03-21 PROCEDURE — 97110 THERAPEUTIC EXERCISES: CPT

## 2023-03-21 PROCEDURE — 97112 NEUROMUSCULAR REEDUCATION: CPT

## 2023-03-21 NOTE — PROGRESS NOTES
to negative with HEP to indicate femoral acetabular mobility needed for gait. Eval:positive bilaterally   Last PN:  2/27/23 Adductor drop test: right: NT/neg left: NT/neg-goal to be met in 4 more weeks  Current: 3/21/23 Adductor Drop Test: right: NT/midline     left: NT/midline     2. Pt will be able to walk >1 mile without left LE or back pain to increase tolerance to daily activities. Eval:pain within 1 mile   Last PN: 2/1/23 not assessed  Current: 2/27/23 pt reports that he is able to walk 1.5 miles-goal MET     3. Pt increase left LE strength to within 5 pounds of right leg to increase tolerance to work and recrational activites and improve left knee stability. Eval:  Strength Right Left   Quad Set strong Strong    Hip Flex 27 pounds 30 pounds   Knee Ext 70 pounds  60 pounds   Hamstrings 40 pounds 25 pounds   **tested with Handheld dynamometer   Last PN: 2/27/23- slow progression-goal to be met in 4 more weeks   Strength Left   Quad Set good   Hip Flex 37#   Knee Ext 65#   Hamstrings 30# (pt felt in back of the knee)   Current:        4. Pt FOTO score will increase by 5 points to show improvement in function. Eval:67  Last PN:  2/27/23 61 regression-but reports feeling better-goal to be met in 4 more weeks  *FOTO score is an established functional score where 100 = no disability*   Current: 3/21//23: will perform again on 21st visit     5. Pt will improve left Knee AROM to equal that of right to increase tolerance to daily activities.   Eval   AROM Right Left   Knee Ext - Flex  0-128 1-119*   **Noted very restricted great toe ext on left  Last PN: 2/27/23 Left Knee AROM Ext-Flex: 0-122/125 (after manual therapy)-progressing-goal to be met in 4 more weeks  Current:       PLAN  Yes  Continue plan of care  []  Upgrade activities as tolerated  []  Discharge due to :  []  Other:    Neil Elizalde, PT , DPT, CIMT   3/21/2023    10:08 AM    Future Appointments   Date Time Provider Parviz Bill   3/23/2023

## 2023-03-23 ENCOUNTER — HOSPITAL ENCOUNTER (OUTPATIENT)
Facility: HOSPITAL | Age: 73
Setting detail: RECURRING SERIES
Discharge: HOME | End: 2023-03-26
Payer: MEDICARE

## 2023-03-23 PROCEDURE — 97110 THERAPEUTIC EXERCISES: CPT

## 2023-03-23 PROCEDURE — 97112 NEUROMUSCULAR REEDUCATION: CPT

## 2023-03-23 PROCEDURE — 97140 MANUAL THERAPY 1/> REGIONS: CPT

## 2023-03-23 NOTE — PROGRESS NOTES
PHYSICAL / OCCUPATIONAL THERAPY - DAILY TREATMENT NOTE (updated )    Patient Name: Kajal Adjutant    Date: 3/23/2023    : 1950  Insurance: Payor: MEDICARE / Plan: MEDICARE PART A AND B / Product Type: *No Product type* /      Patient  verified Yes     Visit #   Current / Total 20 24   Time   In / Out 10:02 10:43   Pain   In / Out 0 tightness 0   Subjective Functional Status/Changes: Reports that he is tight this morning. Reports that he does some the HEP. Reports that he is walking a little more than a mile, plans on inc it more. Reports that over spring break he will try to walk 2x/day. Changes to:  Meds, Allergies, Med Hx, Sx Hx? If yes, update Summary List no       TREATMENT AREA =  No admission diagnoses are documented for this encounter. OBJECTIVE      Therapeutic Procedures: Tx Min Billable or 1:1 Min (if diff from Tx Min) Procedure, Rationale, Specifics   10  20771 Therapeutic Exercise (timed):  increase ROM, strength, coordination, balance, and proprioception to improve patient's ability to progress to PLOF and address remaining functional goals. (see flow sheet as applicable)     Details if applicable:            112 Neuromuscular Re-Education (timed):  improve balance, coordination, kinesthetic sense, posture, core stability and proprioception to improve patient's ability to develop conscious control of individual muscles and awareness of position of extremities in order to progress to PLOF and address remaining functional goals. (see flow sheet as applicable)     Details if applicable:     10029 Manual Therapy (timed):  decrease pain, increase ROM, increase tissue extensibility, decrease trigger points, and increase postural awareness to improve patient's ability to progress to PLOF and address remaining functional goals. The manual therapy interventions were performed at a separate and distinct time from the therapeutic activities interventions .  (see flow sheet as

## 2023-03-24 ENCOUNTER — APPOINTMENT (OUTPATIENT)
Facility: HOSPITAL | Age: 73
End: 2023-03-24
Payer: MEDICARE

## 2023-03-28 ENCOUNTER — HOSPITAL ENCOUNTER (OUTPATIENT)
Facility: HOSPITAL | Age: 73
Setting detail: RECURRING SERIES
Discharge: HOME | End: 2023-03-31
Payer: MEDICARE

## 2023-03-28 PROCEDURE — 97112 NEUROMUSCULAR REEDUCATION: CPT

## 2023-03-28 PROCEDURE — 97530 THERAPEUTIC ACTIVITIES: CPT

## 2023-03-28 NOTE — PROGRESS NOTES
analyze and cue for proper movement patterns, analyze and modify for postural abnormalities, analyze and address imbalance/dizziness, and instruct in home and community integration to address functional deficits and attain remaining goals. New Certification Period: 3/28/23-5/27/23      Priscila Fritz, PT, DPT, CIMT 3/28/2023 9:49 AM    ________________________________________________________________________  I certify that the above Therapy Services are being furnished while the patient is under my care. I agree with the treatment plan and certify that this therapy is necessary. [] I have read the above and request that my patient continue as recommended.   [] I have read the above report and request that my patient continue therapy with the following changes/special instructions: _______________________________________  [] I have read the above report and request that my patient be discharged from therapy    Physician's Signature:____________________ Date:_________ TIME:________    Ezella Adas, Date and Time must be completed for valid certification **    Please sign and return to   [facility] at the [address], [address] [facility]  Phone: [phone]      Fax:  [phone]

## 2023-03-28 NOTE — PROGRESS NOTES
Details if applicable:     10 0 31290 Therapeutic Exercise (timed):  increase ROM, strength, coordination, balance, and proprioception to improve patient's ability to progress to PLOF and address remaining functional goals. (see flow sheet as applicable)     Details if applicable:  performing the tablet   50 40 [facility] Totals Reminder: bill using total billable min of TIMED therapeutic procedures (example: do not include dry needle or estim unattended, both untimed codes, in totals to left)  8-22 min = 1 unit; 23-37 min = 2 units; 38-52 min = 3 units; 53-67 min = 4 units; 68-82 min = 5 units   Total Total     TOTAL TREATMENT TIME:        50     [x]  Patient Education billed concurrently with other procedures   [x] Review HEP    [] Progressed/Changed HEP, detail:    [] Other detail:       Objective Information/Functional Measures/Assessment                     Pretest/Post  Adductor Drop Test: right: midline/neg     left: positive/neg  Lower trunk rotation (inches): Right: 19/18.5 left: 20/19     Knee flexion: 129 degrees after manual (Pt supine at end range of flexion: MFR strumming to articularis genu, inf grade III patellar glides, post proximal tibial grade III mobs to inc knee flexion), extension: 0 degrees      Strength Left   Quad Set good   Hip Flex 40#   Knee Ext 75#   Hamstrings 35# (pt felt in back of the knee)      right: flex: 40# ext: 85#; hip flex: 40#      Assessment:   Pt is a 74 yr old male dx with lower back pain and left knee pain. This is patients 21st visit including evaluation on 1/4/23 and 5th visit since last PN on 2/27/23. Pt has progressed with therapy as pt reporting that he is able to walk 3 miles without difficulty. Pt with inc in left LE strength as noted with inc in dynameter measurements and inc in lower trunk rotation. Pt continues to have aching pain along the lower back and left hip, rating worst pain 2/10.  Pt also continues to have pain/discomfort/numbness along the anterior

## 2023-03-31 ENCOUNTER — HOSPITAL ENCOUNTER (OUTPATIENT)
Facility: HOSPITAL | Age: 73
Setting detail: RECURRING SERIES
End: 2023-03-31
Payer: MEDICARE

## 2023-03-31 PROCEDURE — 97140 MANUAL THERAPY 1/> REGIONS: CPT

## 2023-03-31 PROCEDURE — 97530 THERAPEUTIC ACTIVITIES: CPT

## 2023-03-31 PROCEDURE — 97112 NEUROMUSCULAR REEDUCATION: CPT

## 2023-03-31 NOTE — PROGRESS NOTES
interventions were performed at a separate and distinct time from the therapeutic activities interventions . (see flow sheet as applicable)     Details if applicable:  Pt in hook lying position: LAW AIC correction, Sternal mobilization with active pelvic tilt, Superior T4 (LAW technique), Right subclavius release (LAW technique), LAW infraclavicular rib pump with active breath, left rib mobs with active breath,    Obtained consent for hand placement    Manual iliac depression with \"lady in glasses\"             Details if applicable:     40 39 [facility] Totals Reminder: bill using total billable min of TIMED therapeutic procedures (example: do not include dry needle or estim unattended, both untimed codes, in totals to left)  8-22 min = 1 unit; 23-37 min = 2 units; 38-52 min = 3 units; 53-67 min = 4 units; 68-82 min = 5 units   Total Total     TOTAL TREATMENT TIME:        44     [x]  Patient Education billed concurrently with other procedures   [x] Review HEP    [] Progressed/Changed HEP, detail:    [] Other detail:       Objective Information/Functional Measures/Assessment  Adductor Drop Test: right: midline/     left: positive/  Lower trunk rotation (inches): Right: 19.5/ left: 20/    Assessment: Patient tolerated therapy session well as there were no adverse reactions today. Pt reporting achiness in the back but felt great after therapy session. Pt able to perform QP but had some tenderness in the left knee. Pt is progressing with therapy as indicated by pt tolerating increase in exercise repetitions and resistance. Although showing progress patient would benefit from continuation of skilled physical therapy to address the remaining limitations.       Patient will continue to benefit from skilled PT / OT services to modify and progress therapeutic interventions, analyze and address functional mobility deficits, analyze and address ROM deficits, analyze and address strength deficits, analyze and address soft tissue restrictions, analyze and cue for proper movement patterns, analyze and modify for postural abnormalities, analyze and address imbalance/dizziness, and instruct in home and community integration to address functional deficits and attain remaining goals. Progress toward goals / Updated goals:  []  See Progress Note/Recertification    Short Term Goals: STG- To be accomplished in 5 visits(s):  1. Pt will be compliant with HEP to encourage prophylaxis. Eval:dispensed, to be updated   Last PN: 2/27/23 reports compliance- goal MET     2. Pt will improve trunk rotation to 17 in bilaterally to indicate mobility needed for gait. Eval: Right: 20 in         Left: 19.5 in    Last PN: 3/28/22 Lower trunk rotation (inches): Right: 19/18.5 left: 20/19-slight regression-goal to be met in 8 more weeks  Current: 3/31/23 Lower trunk rotation (inches): Right: 19.5/ left: 20/     Long Term Goals: LTG- To be accomplished in 24 visit(s):  1. Pt will improve hip adduction drop to negative with HEP to indicate femoral acetabular mobility needed for gait. Eval:positive bilaterally   Last PN: 3/28/23 Adductor Drop Test: right: midline/neg     left: positive/neg- goal to be met in 8 more weeks  Current: 3/31/23 Adductor Drop Test: right: midline/     left: positive/    2. Pt will be able to walk >1 mile without left LE or back pain to increase tolerance to daily activities. Eval:pain within 1 mile   Last PN: 2/1/23 not assessed  Current: 2/27/23 pt reports that he is able to walk 1.5 miles-goal MET     3. Pt increase left LE strength to within 5 pounds of right leg to increase tolerance to work and recrational activites and improve left knee stability.   Eval:  Strength Right Left   Quad Set strong Strong    Hip Flex 27 pounds 30 pounds   Knee Ext 70 pounds  60 pounds   Hamstrings 40 pounds 25 pounds   **tested with Handheld dynamometer   Last PN: 2/27/23- slow progression-goal to be met in 4 more weeks   Strength Left   Quad Set

## 2023-04-11 ENCOUNTER — APPOINTMENT (OUTPATIENT)
Facility: HOSPITAL | Age: 73
End: 2023-04-11
Payer: MEDICARE

## 2023-04-13 ENCOUNTER — HOSPITAL ENCOUNTER (OUTPATIENT)
Facility: HOSPITAL | Age: 73
Setting detail: RECURRING SERIES
Discharge: HOME | End: 2023-04-16
Payer: MEDICARE

## 2023-04-13 PROCEDURE — 97112 NEUROMUSCULAR REEDUCATION: CPT

## 2023-04-13 PROCEDURE — 97140 MANUAL THERAPY 1/> REGIONS: CPT

## 2023-04-18 ENCOUNTER — HOSPITAL ENCOUNTER (OUTPATIENT)
Facility: HOSPITAL | Age: 73
Setting detail: RECURRING SERIES
Discharge: HOME | End: 2023-04-21
Payer: MEDICARE

## 2023-04-18 PROCEDURE — 97530 THERAPEUTIC ACTIVITIES: CPT

## 2023-04-18 PROCEDURE — 97110 THERAPEUTIC EXERCISES: CPT

## 2023-04-18 PROCEDURE — 97112 NEUROMUSCULAR REEDUCATION: CPT

## 2023-04-18 NOTE — PROGRESS NOTES
PHYSICAL / OCCUPATIONAL THERAPY - DAILY TREATMENT NOTE (updated )    Patient Name: Doreen Aschoff    Date: 2023    : 1950  Insurance: Payor: MEDICARE / Plan: MEDICARE PART A AND B / Product Type: *No Product type* /      Patient  verified Yes          Visit #   Current / Total 24 37   Time   In / Out 720 805   Pain   In / Out 1 0   Subjective Functional Status/Changes: Still have that pain in my knee. My back was tight after edging this weekend but felt better after I did the exercises. Changes to:  Meds, Allergies, Med Hx, Sx Hx? If yes, update Summary List no       TREATMENT AREA =  No admission diagnoses are documented for this encounter. OBJECTIVE    Therapeutic Procedures: Tx Min Billable or 1:1 Min (if diff from Tx Min) Procedure, Rationale, Specifics   8  90742 Therapeutic Exercise (timed):  increase ROM, strength, coordination, balance, and proprioception to improve patient's ability to progress to PLOF and address remaining functional goals. (see flow sheet as applicable)     Details if applicable:       15  69563 Neuromuscular Re-Education (timed):  improve balance, coordination, kinesthetic sense, posture, core stability and proprioception to improve patient's ability to develop conscious control of individual muscles and awareness of position of extremities in order to progress to PLOF and address remaining functional goals. (see flow sheet as applicable)     Details if applicable:     22  42470 Therapeutic Activity (timed):  use of dynamic activities replicating functional movements to increase ROM, strength, coordination, balance, and proprioception in order to improve patient's ability to progress to PLOF and address remaining functional goals.   (see flow sheet as applicable)     Details if applicable:            Details if applicable:            Details if applicable:     39  [address] [facility] [facility] [address] Reminder: bill using total billable min of TIMED therapeutic procedures (example: do

## 2023-04-20 ENCOUNTER — HOSPITAL ENCOUNTER (OUTPATIENT)
Facility: HOSPITAL | Age: 73
Setting detail: RECURRING SERIES
Discharge: HOME | End: 2023-04-23
Payer: MEDICARE

## 2023-04-20 PROCEDURE — 97112 NEUROMUSCULAR REEDUCATION: CPT

## 2023-04-20 PROCEDURE — 97140 MANUAL THERAPY 1/> REGIONS: CPT

## 2023-04-20 PROCEDURE — 97530 THERAPEUTIC ACTIVITIES: CPT

## 2023-04-25 ENCOUNTER — HOSPITAL ENCOUNTER (OUTPATIENT)
Facility: HOSPITAL | Age: 73
Setting detail: RECURRING SERIES
Discharge: HOME | End: 2023-04-28
Payer: MEDICARE

## 2023-04-25 PROCEDURE — 97112 NEUROMUSCULAR REEDUCATION: CPT

## 2023-04-25 PROCEDURE — 97140 MANUAL THERAPY 1/> REGIONS: CPT

## 2023-04-25 PROCEDURE — 97110 THERAPEUTIC EXERCISES: CPT

## 2023-04-25 NOTE — PROGRESS NOTES
PHYSICAL / OCCUPATIONAL THERAPY - DAILY TREATMENT NOTE (updated )    Patient Name: Marycarmen Becerra    Date: 2023    : 1950  Insurance: Payor: MEDICARE / Plan: MEDICARE PART A AND B / Product Type: *No Product type* /      Patient  verified Yes     Visit #   Current / Total 26 37   Time   In / Out 720 805   Pain   In / Out 1 0   Subjective Functional Status/Changes: Stiff this morning but getting better. I am a little sore from helping my daughter this weekend. Changes to:  Meds, Allergies, Med Hx, Sx Hx? If yes, update Summary List no       TREATMENT AREA =  No admission diagnoses are documented for this encounter. OBJECTIVE      Therapeutic Procedures: Tx Min Billable or 1:1 Min (if diff from Tx Min) Procedure, Rationale, Specifics   8  27205 Manual Therapy (timed):  increase ROM and increase postural awareness to improve patient's ability to progress to PLOF and address remaining functional goals. The manual therapy interventions were performed at a separate and distinct time from the therapeutic activities interventions . (see flow sheet as applicable)     Details if applicable:  LAW AIC correction, Sternal mobilization with active pelvic tilt,   2 person rib mobs   Obtained consent for hand placement        15  79142 Therapeutic Exercise (timed):  increase ROM, strength, coordination, balance, and proprioception to improve patient's ability to progress to PLOF and address remaining functional goals. (see flow sheet as applicable)     Details if applicable:     22  50573 Neuromuscular Re-Education (timed):  improve balance, coordination, kinesthetic sense, posture, core stability and proprioception to improve patient's ability to develop conscious control of individual muscles and awareness of position of extremities in order to progress to PLOF and address remaining functional goals.  (see flow sheet as applicable)     Details if applicable:            Details if applicable:

## 2023-04-27 ENCOUNTER — HOSPITAL ENCOUNTER (OUTPATIENT)
Facility: HOSPITAL | Age: 73
Setting detail: RECURRING SERIES
Discharge: HOME | End: 2023-04-30
Payer: MEDICARE

## 2023-04-27 PROCEDURE — 97112 NEUROMUSCULAR REEDUCATION: CPT

## 2023-04-27 PROCEDURE — 97530 THERAPEUTIC ACTIVITIES: CPT

## 2023-05-02 ENCOUNTER — HOSPITAL ENCOUNTER (OUTPATIENT)
Facility: HOSPITAL | Age: 73
Setting detail: RECURRING SERIES
Discharge: HOME | End: 2023-05-05
Payer: MEDICARE

## 2023-05-02 PROCEDURE — 97530 THERAPEUTIC ACTIVITIES: CPT

## 2023-05-02 PROCEDURE — 97112 NEUROMUSCULAR REEDUCATION: CPT

## 2023-05-02 NOTE — PROGRESS NOTES
PHYSICAL / OCCUPATIONAL THERAPY - DAILY TREATMENT NOTE (updated )    Patient Name: Aneta Roblero    Date: 2023    : 1950  Insurance: Payor: MEDICARE / Plan: MEDICARE PART A AND B / Product Type: *No Product type* /      Patient  verified Yes     Visit #   Current / Total 28 35   Time   In / Out 720 800   Pain   In / Out 1 0   Subjective Functional Status/Changes: Still feel in the outer knee with bending and that numbness. Changes to:  Meds, Allergies, Med Hx, Sx Hx? If yes, update Summary List no       TREATMENT AREA =  No admission diagnoses are documented for this encounter. OBJECTIVE    Therapeutic Procedures: Tx Min Billable or 1:1 Min (if diff from Tx Min) Procedure, Rationale, Specifics   30  [id number] Neuromuscular Re-Education (timed):  improve balance, coordination, kinesthetic sense, posture, core stability and proprioception to improve patient's ability to develop conscious control of individual muscles and awareness of position of extremities in order to progress to PLOF and address remaining functional goals. (see flow sheet as applicable)     Details if applicable:       10  [id number] Therapeutic Activity (timed):  use of dynamic activities replicating functional movements to increase ROM, strength, coordination, balance, and proprioception in order to improve patient's ability to progress to PLOF and address remaining functional goals.   (see flow sheet as applicable)     Details if applicable:            Details if applicable:            Details if applicable:            Details if applicable:     36  [facility] Totals Reminder: bill using total billable min of TIMED therapeutic procedures (example: do not include dry needle or estim unattended, both untimed codes, in totals to left)  8-22 min = 1 unit; 23-37 min = 2 units; 38-52 min = 3 units; 53-67 min = 4 units; 68-82 min = 5 units   Total Total     TOTAL TREATMENT TIME:        40     [x]  Patient Education billed concurrently with

## 2023-05-04 ENCOUNTER — HOSPITAL ENCOUNTER (OUTPATIENT)
Facility: HOSPITAL | Age: 73
Setting detail: RECURRING SERIES
Discharge: HOME | End: 2023-05-07
Payer: MEDICARE

## 2023-05-04 PROCEDURE — 97112 NEUROMUSCULAR REEDUCATION: CPT

## 2023-05-04 PROCEDURE — 97530 THERAPEUTIC ACTIVITIES: CPT

## 2023-05-09 ENCOUNTER — HOSPITAL ENCOUNTER (OUTPATIENT)
Facility: HOSPITAL | Age: 73
Setting detail: RECURRING SERIES
Discharge: HOME | End: 2023-05-12
Payer: MEDICARE

## 2023-05-09 PROCEDURE — 97530 THERAPEUTIC ACTIVITIES: CPT

## 2023-05-09 PROCEDURE — 97110 THERAPEUTIC EXERCISES: CPT

## 2023-05-09 PROCEDURE — 97112 NEUROMUSCULAR REEDUCATION: CPT

## 2023-05-09 NOTE — PROGRESS NOTES
PHYSICAL / OCCUPATIONAL THERAPY - DAILY TREATMENT NOTE (updated )    Patient Name: Donna Thorne    Date: 2023    : 1950  Insurance: Payor: MEDICARE / Plan: MEDICARE PART A AND B / Product Type: *No Product type* /      Patient  verified Yes     Visit #   Current / Total 30 35   Time   In / Out 720 800   Pain   In / Out 0 0   Subjective Functional Status/Changes: Feeling like the hip and low back are getting better. My knee only hurts when I bend it all the way. Changes to:  Meds, Allergies, Med Hx, Sx Hx? If yes, update Summary List no       TREATMENT AREA =  No admission diagnoses are documented for this encounter. OBJECTIVE      Therapeutic Procedures: Tx Min Billable or 1:1 Min (if diff from Tx Min) Procedure, Rationale, Specifics   10  [id number] Therapeutic Exercise (timed):  increase ROM, strength, coordination, balance, and proprioception to improve patient's ability to progress to PLOF and address remaining functional goals. (see flow sheet as applicable)     Details if applicable:       20  [id number] Neuromuscular Re-Education (timed):  improve balance, coordination, kinesthetic sense, posture, core stability and proprioception to improve patient's ability to develop conscious control of individual muscles and awareness of position of extremities in order to progress to PLOF and address remaining functional goals. (see flow sheet as applicable)     Details if applicable:     10  [id number] Therapeutic Activity (timed):  use of dynamic activities replicating functional movements to increase ROM, strength, coordination, balance, and proprioception in order to improve patient's ability to progress to PLOF and address remaining functional goals.   (see flow sheet as applicable)     Details if applicable:            Details if applicable:            Details if applicable:     36  [facility] Totals Reminder: bill using total billable min of TIMED therapeutic procedures (example: do not include dry needle

## 2023-05-11 ENCOUNTER — HOSPITAL ENCOUNTER (OUTPATIENT)
Facility: HOSPITAL | Age: 73
Setting detail: RECURRING SERIES
Discharge: HOME | End: 2023-05-14
Payer: MEDICARE

## 2023-05-11 PROCEDURE — 97112 NEUROMUSCULAR REEDUCATION: CPT

## 2023-05-11 PROCEDURE — 97140 MANUAL THERAPY 1/> REGIONS: CPT

## 2023-05-11 PROCEDURE — 97530 THERAPEUTIC ACTIVITIES: CPT

## 2023-05-15 ENCOUNTER — HOSPITAL ENCOUNTER (OUTPATIENT)
Facility: HOSPITAL | Age: 73
Setting detail: RECURRING SERIES
Discharge: HOME | End: 2023-05-18
Payer: MEDICARE

## 2023-05-15 PROCEDURE — 97112 NEUROMUSCULAR REEDUCATION: CPT

## 2023-05-15 PROCEDURE — 97530 THERAPEUTIC ACTIVITIES: CPT

## 2023-05-15 PROCEDURE — 97140 MANUAL THERAPY 1/> REGIONS: CPT

## 2023-05-15 NOTE — PROGRESS NOTES
PHYSICAL / OCCUPATIONAL THERAPY - DAILY TREATMENT NOTE (updated )    Patient Name: Brandon Richards    Date: 5/15/2023    : 1950  Insurance: Payor: MEDICARE / Plan: MEDICARE PART A AND B / Product Type: *No Product type* /      Patient  verified Yes     Visit #   Current / Total 32 35   Time   In / Out 8:00 8:40   Pain   In / Out 1/10 0   Subjective Functional Status/Changes: Reports that he had a good workout on Saturday. Reports that he just feels it. Reports he cut grass on Saturday. Reports that he worked 3.5-4 hrs and had no issues with the knee. Changes to:  Meds, Allergies, Med Hx, Sx Hx? If yes, update Summary List no       TREATMENT AREA =  Other low back pain [M54.59]  Pain in left knee [M25.562]    OBJECTIVE         Therapeutic Procedures: Tx Min Billable or 1:1 Min (if diff from Tx Min) Procedure, Rationale, Specifics   10   [id number] Therapeutic Activity (timed):  use of dynamic activities replicating functional movements to increase ROM, strength, coordination, balance, and proprioception in order to improve patient's ability to progress to PLOF and address remaining functional goals. (see flow sheet as applicable)      Details if applicable:        30   [id number] Neuromuscular Re-Education (timed):  improve balance, coordination, kinesthetic sense, posture, core stability and proprioception to improve patient's ability to develop conscious control of individual muscles and awareness of position of extremities in order to progress to PLOF and address remaining functional goals.  (see flow sheet as applicable)      Details if applicable:                          36   [facility] Totals Reminder: bill using total billable min of TIMED therapeutic procedures (example: do not include dry needle or estim unattended, both untimed codes, in totals to left)  8-22 min = 1 unit; 23-37 min = 2 units; 38-52 min = 3 units; 53-67 min = 4 units; 68-82 min = 5 units   Total Total       TOTAL TREATMENT TIME:       40

## 2023-05-16 ENCOUNTER — APPOINTMENT (OUTPATIENT)
Facility: HOSPITAL | Age: 73
End: 2023-05-16
Payer: MEDICARE

## 2023-05-18 ENCOUNTER — HOSPITAL ENCOUNTER (OUTPATIENT)
Facility: HOSPITAL | Age: 73
Setting detail: RECURRING SERIES
Discharge: HOME | End: 2023-05-21
Payer: MEDICARE

## 2023-05-18 PROCEDURE — 97140 MANUAL THERAPY 1/> REGIONS: CPT

## 2023-05-18 PROCEDURE — 97112 NEUROMUSCULAR REEDUCATION: CPT

## 2023-05-18 PROCEDURE — 97530 THERAPEUTIC ACTIVITIES: CPT

## 2023-05-23 ENCOUNTER — HOSPITAL ENCOUNTER (OUTPATIENT)
Facility: HOSPITAL | Age: 73
Setting detail: RECURRING SERIES
Discharge: HOME | End: 2023-05-26
Payer: MEDICARE

## 2023-05-23 PROCEDURE — 97112 NEUROMUSCULAR REEDUCATION: CPT

## 2023-05-23 PROCEDURE — 97530 THERAPEUTIC ACTIVITIES: CPT

## 2023-05-23 NOTE — PROGRESS NOTES
PHYSICAL / OCCUPATIONAL THERAPY - DAILY TREATMENT NOTE (updated )    Patient Name: Beni Crane    Date: 2023    : 1950  Insurance: Payor: MEDICARE / Plan: MEDICARE PART A AND B / Product Type: *No Product type* /      Patient  verified Yes     Visit #   Current / Total 34 35   Time   In / Out 720 800   Pain   In / Out 1 0   Subjective Functional Status/Changes: Still feel it just a little. I got the disability from the [place]. Changes to:  Meds, Allergies, Med Hx, Sx Hx? If yes, update Summary List no       TREATMENT AREA =  Other low back pain [M54.59]  Pain in left knee [M25.562]    OBJECTIVE    Therapeutic Procedures: Tx Min Billable or 1:1 Min (if diff from Tx Min) Procedure, Rationale, Specifics   25  [id number] Neuromuscular Re-Education (timed):  improve balance, coordination, kinesthetic sense, posture, core stability and proprioception to improve patient's ability to develop conscious control of individual muscles and awareness of position of extremities in order to progress to PLOF and address remaining functional goals. (see flow sheet as applicable)     Details if applicable:       15  [id number] Therapeutic Activity (timed):  use of dynamic activities replicating functional movements to increase ROM, strength, coordination, balance, and proprioception in order to improve patient's ability to progress to PLOF and address remaining functional goals.   (see flow sheet as applicable)     Details if applicable:            Details if applicable:            Details if applicable:            Details if applicable:     36  [facility] Totals Reminder: bill using total billable min of TIMED therapeutic procedures (example: do not include dry needle or estim unattended, both untimed codes, in totals to left)  8-22 min = 1 unit; 23-37 min = 2 units; 38-52 min = 3 units; 53-67 min = 4 units; 68-82 min = 5 units   Total Total     TOTAL TREATMENT TIME:        40     [x]  Patient Education billed concurrently with

## 2023-05-25 ENCOUNTER — HOSPITAL ENCOUNTER (OUTPATIENT)
Facility: HOSPITAL | Age: 73
Setting detail: RECURRING SERIES
Discharge: HOME | End: 2023-05-28
Payer: MEDICARE

## 2023-05-25 PROCEDURE — 97535 SELF CARE MNGMENT TRAINING: CPT

## 2023-05-25 PROCEDURE — 97530 THERAPEUTIC ACTIVITIES: CPT

## 2023-05-25 PROCEDURE — 97112 NEUROMUSCULAR REEDUCATION: CPT

## 2023-05-31 ENCOUNTER — APPOINTMENT (OUTPATIENT)
Facility: HOSPITAL | Age: 73
End: 2023-05-31
Payer: MEDICARE

## (undated) DEVICE — BLADE SAW 1.19X20X90 MM FOR LG BNE

## (undated) DEVICE — PAD,ABDOMINAL,5"X9",ST,LF,25/BX: Brand: MEDLINE INDUSTRIES, INC.

## (undated) DEVICE — TOWEL,OR,DSP,ST,BLUE,STD,4/PK,20PK/CS: Brand: MEDLINE

## (undated) DEVICE — ZIMMER® STERILE DISPOSABLE TOURNIQUET CUFF WITH PROTECTIVE SLEEVE AND PLC, SINGLE PORT, SINGLE BLADDER, 34 IN. (86 CM)

## (undated) DEVICE — HOOD, PEEL-AWAY: Brand: FLYTE

## (undated) DEVICE — NEEDLE SPNL 20GA L3.5IN YEL HUB S STL REG WALL FIT STYL W/

## (undated) DEVICE — GARMENT COMPR M FOR 13IN FT INTMIT SGL BLDR HEM FORC II

## (undated) DEVICE — SOLUTION,WATER,IRRIGATION,500ML,STERILE: Brand: MEDLINE

## (undated) DEVICE — 3 BONE CEMENT MIXER: Brand: MIXEVAC

## (undated) DEVICE — HANDPIECE SET WITH HIGH FLOW TIP AND SUCTION TUBE: Brand: INTERPULSE

## (undated) DEVICE — GLOVE SURG SZ 75 L12IN FNGR THK79MIL GRN LTX FREE

## (undated) DEVICE — 3M™ STERI-DRAPE™ U-DRAPE 1015: Brand: STERI-DRAPE™

## (undated) DEVICE — SUT VCRL + 2-0 27IN CT2 UD -- 36/BX

## (undated) DEVICE — SPONGE GZ W4XL4IN COT 12 PLY TYP VII WVN C FLD DSGN

## (undated) DEVICE — SOL IRR SOD CL 0.9% 3000ML BG --

## (undated) DEVICE — Device

## (undated) DEVICE — DRAPE XR C ARM 41X74IN LF --

## (undated) DEVICE — CONTAINER,SPECIMEN,OR STERILE,4OZ: Brand: MEDLINE

## (undated) DEVICE — DRAPE C-ARMOUR C-ARM KIT --

## (undated) DEVICE — PACK PROCEDURE SURG TOT KNEE CUST

## (undated) DEVICE — GLOVE ORANGE PI 7 1/2   MSG9075

## (undated) DEVICE — GUIDE COR SNUS L135CM DIA0.035IN SHTH L50CM DIA9FR BRAID

## (undated) DEVICE — SUTURE 1 STRATAFIX SYMMETRIC PDS + 15CM CT-1 SXPP1A420

## (undated) DEVICE — PEEL-AWAY TOGA, X-LARGE: Brand: FLYTE

## (undated) DEVICE — SPONGE LAP 18X18IN STRL -- 5/PK

## (undated) DEVICE — SOL IRR NACL 0.9% 500ML POUR --

## (undated) DEVICE — BLADE SAW W13XL90MM 1.19MM PARA